# Patient Record
Sex: FEMALE | Race: WHITE | URBAN - METROPOLITAN AREA
[De-identification: names, ages, dates, MRNs, and addresses within clinical notes are randomized per-mention and may not be internally consistent; named-entity substitution may affect disease eponyms.]

---

## 2017-05-30 ENCOUNTER — OUTPATIENT (OUTPATIENT)
Dept: OUTPATIENT SERVICES | Facility: HOSPITAL | Age: 52
LOS: 1 days | Discharge: HOME | End: 2017-05-30

## 2017-06-26 ENCOUNTER — OUTPATIENT (OUTPATIENT)
Dept: OUTPATIENT SERVICES | Facility: HOSPITAL | Age: 52
LOS: 1 days | Discharge: HOME | End: 2017-06-26

## 2017-06-26 DIAGNOSIS — T50.901A POISONING BY UNSPECIFIED DRUGS, MEDICAMENTS AND BIOLOGICAL SUBSTANCES, ACCIDENTAL (UNINTENTIONAL), INITIAL ENCOUNTER: ICD-10-CM

## 2017-06-28 DIAGNOSIS — R51 HEADACHE: ICD-10-CM

## 2018-08-30 ENCOUNTER — INPATIENT (INPATIENT)
Facility: HOSPITAL | Age: 53
LOS: 2 days | Discharge: HOME | End: 2018-09-02
Attending: HOSPITALIST | Admitting: HOSPITALIST

## 2018-08-30 VITALS
DIASTOLIC BLOOD PRESSURE: 52 MMHG | HEART RATE: 55 BPM | OXYGEN SATURATION: 99 % | RESPIRATION RATE: 20 BRPM | TEMPERATURE: 98 F | SYSTOLIC BLOOD PRESSURE: 106 MMHG

## 2018-08-30 LAB
ALBUMIN SERPL ELPH-MCNC: 3.2 G/DL — LOW (ref 3.5–5.2)
ALP SERPL-CCNC: 121 U/L — HIGH (ref 30–115)
ALT FLD-CCNC: 13 U/L — SIGNIFICANT CHANGE UP (ref 0–41)
ANION GAP SERPL CALC-SCNC: 16 MMOL/L — HIGH (ref 7–14)
APAP SERPL-MCNC: <5 UG/ML — LOW (ref 10–30)
APPEARANCE UR: ABNORMAL
AST SERPL-CCNC: 14 U/L — SIGNIFICANT CHANGE UP (ref 0–41)
BACTERIA # UR AUTO: ABNORMAL /HPF
BASE EXCESS BLDV CALC-SCNC: 1.4 MMOL/L — SIGNIFICANT CHANGE UP (ref -2–2)
BASOPHILS # BLD AUTO: 0.04 K/UL — SIGNIFICANT CHANGE UP (ref 0–0.2)
BASOPHILS NFR BLD AUTO: 0.6 % — SIGNIFICANT CHANGE UP (ref 0–1)
BILIRUB DIRECT SERPL-MCNC: <0.2 MG/DL — SIGNIFICANT CHANGE UP (ref 0–0.2)
BILIRUB INDIRECT FLD-MCNC: SIGNIFICANT CHANGE UP MG/DL (ref 0.2–1.2)
BILIRUB SERPL-MCNC: <0.2 MG/DL — SIGNIFICANT CHANGE UP (ref 0.2–1.2)
BILIRUB UR-MCNC: NEGATIVE — SIGNIFICANT CHANGE UP
BUN SERPL-MCNC: 10 MG/DL — SIGNIFICANT CHANGE UP (ref 10–20)
CA-I SERPL-SCNC: 1.22 MMOL/L — SIGNIFICANT CHANGE UP (ref 1.12–1.3)
CALCIUM SERPL-MCNC: 8.3 MG/DL — LOW (ref 8.5–10.1)
CHLORIDE SERPL-SCNC: 107 MMOL/L — SIGNIFICANT CHANGE UP (ref 98–110)
CO2 SERPL-SCNC: 20 MMOL/L — SIGNIFICANT CHANGE UP (ref 17–32)
COLOR SPEC: YELLOW — SIGNIFICANT CHANGE UP
CREAT SERPL-MCNC: 0.8 MG/DL — SIGNIFICANT CHANGE UP (ref 0.7–1.5)
DIFF PNL FLD: NEGATIVE — SIGNIFICANT CHANGE UP
EOSINOPHIL # BLD AUTO: 0.07 K/UL — SIGNIFICANT CHANGE UP (ref 0–0.7)
EOSINOPHIL NFR BLD AUTO: 1 % — SIGNIFICANT CHANGE UP (ref 0–8)
EPI CELLS # UR: ABNORMAL /HPF
GAS PNL BLDV: 141 MMOL/L — SIGNIFICANT CHANGE UP (ref 136–145)
GAS PNL BLDV: SIGNIFICANT CHANGE UP
GLUCOSE SERPL-MCNC: 88 MG/DL — SIGNIFICANT CHANGE UP (ref 70–99)
GLUCOSE UR QL: NEGATIVE MG/DL — SIGNIFICANT CHANGE UP
HCO3 BLDV-SCNC: 25 MMOL/L — SIGNIFICANT CHANGE UP (ref 22–29)
HCT VFR BLD CALC: 33.6 % — LOW (ref 37–47)
HCT VFR BLDA CALC: 34.8 % — SIGNIFICANT CHANGE UP (ref 34–44)
HGB BLD CALC-MCNC: 11.4 G/DL — LOW (ref 14–18)
HGB BLD-MCNC: 10.9 G/DL — LOW (ref 12–16)
IMM GRANULOCYTES NFR BLD AUTO: 0.3 % — SIGNIFICANT CHANGE UP (ref 0.1–0.3)
KETONES UR-MCNC: NEGATIVE — SIGNIFICANT CHANGE UP
LACTATE BLDV-MCNC: 0.6 MMOL/L — SIGNIFICANT CHANGE UP (ref 0.5–1.6)
LEUKOCYTE ESTERASE UR-ACNC: ABNORMAL
LIDOCAIN IGE QN: 32 U/L — SIGNIFICANT CHANGE UP (ref 7–60)
LYMPHOCYTES # BLD AUTO: 1.4 K/UL — SIGNIFICANT CHANGE UP (ref 1.2–3.4)
LYMPHOCYTES # BLD AUTO: 20.5 % — SIGNIFICANT CHANGE UP (ref 20.5–51.1)
MCHC RBC-ENTMCNC: 27.4 PG — SIGNIFICANT CHANGE UP (ref 27–31)
MCHC RBC-ENTMCNC: 32.4 G/DL — SIGNIFICANT CHANGE UP (ref 32–37)
MCV RBC AUTO: 84.4 FL — SIGNIFICANT CHANGE UP (ref 81–99)
MONOCYTES # BLD AUTO: 0.49 K/UL — SIGNIFICANT CHANGE UP (ref 0.1–0.6)
MONOCYTES NFR BLD AUTO: 7.2 % — SIGNIFICANT CHANGE UP (ref 1.7–9.3)
NEUTROPHILS # BLD AUTO: 4.82 K/UL — SIGNIFICANT CHANGE UP (ref 1.4–6.5)
NEUTROPHILS NFR BLD AUTO: 70.4 % — SIGNIFICANT CHANGE UP (ref 42.2–75.2)
NITRITE UR-MCNC: POSITIVE
NRBC # BLD: 0 /100 WBCS — SIGNIFICANT CHANGE UP (ref 0–0)
PCO2 BLDV: 37 MMHG — LOW (ref 41–51)
PH BLDV: 7.45 — HIGH (ref 7.26–7.43)
PH UR: 6.5 — SIGNIFICANT CHANGE UP (ref 5–8)
PLATELET # BLD AUTO: 237 K/UL — SIGNIFICANT CHANGE UP (ref 130–400)
PO2 BLDV: 57 MMHG — HIGH (ref 20–40)
POTASSIUM BLDV-SCNC: 4.5 MMOL/L — SIGNIFICANT CHANGE UP (ref 3.3–5.6)
POTASSIUM SERPL-MCNC: 5.2 MMOL/L — HIGH (ref 3.5–5)
POTASSIUM SERPL-SCNC: 5.2 MMOL/L — HIGH (ref 3.5–5)
PROT SERPL-MCNC: 6.8 G/DL — SIGNIFICANT CHANGE UP (ref 6–8)
PROT UR-MCNC: NEGATIVE MG/DL — SIGNIFICANT CHANGE UP
RBC # BLD: 3.98 M/UL — LOW (ref 4.2–5.4)
RBC # FLD: 13.7 % — SIGNIFICANT CHANGE UP (ref 11.5–14.5)
SALICYLATES SERPL-MCNC: <0.3 MG/DL — LOW (ref 4–30)
SAO2 % BLDV: 91 % — SIGNIFICANT CHANGE UP
SODIUM SERPL-SCNC: 143 MMOL/L — SIGNIFICANT CHANGE UP (ref 135–146)
SP GR SPEC: 1.01 — SIGNIFICANT CHANGE UP (ref 1.01–1.03)
TROPONIN T SERPL-MCNC: <0.01 NG/ML — SIGNIFICANT CHANGE UP
UROBILINOGEN FLD QL: 0.2 MG/DL — SIGNIFICANT CHANGE UP (ref 0.2–0.2)
WBC # BLD: 6.84 K/UL — SIGNIFICANT CHANGE UP (ref 4.8–10.8)
WBC # FLD AUTO: 6.84 K/UL — SIGNIFICANT CHANGE UP (ref 4.8–10.8)
WBC UR QL: ABNORMAL /HPF

## 2018-08-30 RX ORDER — CEFTRIAXONE 500 MG/1
1 INJECTION, POWDER, FOR SOLUTION INTRAMUSCULAR; INTRAVENOUS ONCE
Qty: 0 | Refills: 0 | Status: COMPLETED | OUTPATIENT
Start: 2018-08-30 | End: 2018-08-30

## 2018-08-30 RX ORDER — AZITHROMYCIN 500 MG/1
500 TABLET, FILM COATED ORAL ONCE
Qty: 0 | Refills: 0 | Status: COMPLETED | OUTPATIENT
Start: 2018-08-30 | End: 2018-08-30

## 2018-08-30 RX ORDER — PANTOPRAZOLE SODIUM 20 MG/1
40 TABLET, DELAYED RELEASE ORAL ONCE
Qty: 0 | Refills: 0 | Status: COMPLETED | OUTPATIENT
Start: 2018-08-30 | End: 2018-08-30

## 2018-08-30 RX ORDER — ASPIRIN/CALCIUM CARB/MAGNESIUM 324 MG
324 TABLET ORAL ONCE
Qty: 0 | Refills: 0 | Status: COMPLETED | OUTPATIENT
Start: 2018-08-30 | End: 2018-08-30

## 2018-08-30 RX ORDER — IPRATROPIUM/ALBUTEROL SULFATE 18-103MCG
3 AEROSOL WITH ADAPTER (GRAM) INHALATION
Qty: 0 | Refills: 0 | Status: COMPLETED | OUTPATIENT
Start: 2018-08-30 | End: 2018-08-30

## 2018-08-30 RX ORDER — SODIUM CHLORIDE 9 MG/ML
1000 INJECTION INTRAMUSCULAR; INTRAVENOUS; SUBCUTANEOUS
Qty: 0 | Refills: 0 | Status: DISCONTINUED | OUTPATIENT
Start: 2018-08-30 | End: 2018-09-02

## 2018-08-30 RX ADMIN — Medication 3 MILLILITER(S): at 19:40

## 2018-08-30 RX ADMIN — Medication 324 MILLIGRAM(S): at 20:25

## 2018-08-30 RX ADMIN — PANTOPRAZOLE SODIUM 40 MILLIGRAM(S): 20 TABLET, DELAYED RELEASE ORAL at 20:23

## 2018-08-30 RX ADMIN — CEFTRIAXONE 100 GRAM(S): 500 INJECTION, POWDER, FOR SOLUTION INTRAMUSCULAR; INTRAVENOUS at 20:32

## 2018-08-30 RX ADMIN — AZITHROMYCIN 255 MILLIGRAM(S): 500 TABLET, FILM COATED ORAL at 20:33

## 2018-08-30 RX ADMIN — SODIUM CHLORIDE 250 MILLILITER(S): 9 INJECTION INTRAMUSCULAR; INTRAVENOUS; SUBCUTANEOUS at 18:27

## 2018-08-30 RX ADMIN — Medication 3 MILLILITER(S): at 20:23

## 2018-08-30 RX ADMIN — Medication 3 MILLILITER(S): at 20:19

## 2018-08-30 NOTE — ED PROVIDER NOTE - PHYSICAL EXAMINATION
CONSTITUTIONAL: Well-appearing; well-nourished; in no apparent distress.   EYES: PERRL; EOM intact.   ENT: Airway patent  NECK: Supple; non-tender; no cervical lymphadenopathy. No JVD.   CARDIOVASCULAR: Normal S1, S2; no murmurs, rubs, or gallops.   RESPIRATORY: + b/l rhonchi. No tachypnea, retractions, signs of respiratory distress  GI/: Normal bowel sounds; non-distended; non-tender; no palpable organomegaly.   MS: No evidence of trauma or deformity. Normal ROM in all four extremities; non-tender to palpation  EXTREM: No peripheral edema. No calf tenderness  SKIN: Normal for age and race; warm; dry; good turgor; no apparent lesions or exudate.   NEURO/PSYCH: A & O x 4; grossly unremarkable. mood and manner are appropriate. Grooming and personal hygiene are appropriate. No apparent thoughts of harm to self or others.

## 2018-08-30 NOTE — ED ADULT NURSE NOTE - NSIMPLEMENTINTERV_GEN_ALL_ED
Implemented All Universal Safety Interventions:  [place] to call system. Call bell, personal items and telephone within reach. Instruct patient to call for assistance. Room bathroom lighting operational. Non-slip footwear when patient is off stretcher. Physically safe environment: no spills, clutter or unnecessary equipment. Stretcher in lowest position, wheels locked, appropriate side rails in place.

## 2018-08-30 NOTE — ED ADULT NURSE NOTE - OBJECTIVE STATEMENT
Patient present ED with fever, chills, chest pain, and SOB x3 days. Denies fever, chills, nausea, vomiting, and diarrhea.

## 2018-08-30 NOTE — ED PROVIDER NOTE - CARE PLAN
Principal Discharge DX:	Pneumonia of right lower lobe due to infectious organism  Secondary Diagnosis:	Chest pain, unspecified type Principal Discharge DX:	Pneumonia of right lower lobe due to infectious organism  Assessment and plan of treatment:	abx, duoneb, admission  Secondary Diagnosis:	Chest pain, unspecified type

## 2018-08-30 NOTE — ED PROVIDER NOTE - ATTENDING CONTRIBUTION TO CARE
This is a 53yoF fibromyalgia, hx Lyme disease, polysubstance abuse (benzos, oxycodone, ambien), active smoker who presents with 1wk of nightly fevers (T103), worsening fatigue, new chest pain and SOB.  CP is substernal and radiating L and R across anterior chest, associated with SOB and nausea w/o vomiting. Also endorses constipation (acute on chronic), not relieved by OTC remedies and her usual bowel regimen (though she has not been taking her senokot).    She was seen 2wk ago in [place] for syncope and discharged, but does not know what her discharge diagnosis was.  She was supposed to f/u with cardiology, but appointment was delayed 2/2 cardiologist vacation, so she saw her PCP today, who suggested she come to the ED.  Family report sig concern about her fevers, also noting that she is intermittently falling asleep during the day, for example at the table during meals, and that she seems weaker than usual.  Her sister acknowledges that she is still smoking cig and taking prescription pills from other people, though pt may deny it.    VS notable for afebrile HR 55 /52  Exam shows thin, sallow woman, in NAD, speaking slowly but in full sentences.  Mildly dry MM, bradycardic w/o murmurs, b/l breath sounds w/ expiratory wheezing but w/o accessory muscle use. Abd soft, NT, ND.  Pt asking for a warm blanket and a hot drink as she is feeling chilled.    Unclear etiology for her sx, with ddx including infectious (new/acute infection, also possibly chronic Lyme). Bacteremia and endocarditis is possible, though pt denies IVDU.  Her wheezing may be related to likely COPD in the setting of years of smoking with occ inhaler use at baseline, but may also indicate pneumonia.  Her encephalopathy may be from infection, dehydration, substance use; less likely intracranial given nonfocal neuro exam.    Will start with labs, CXR, EKG (reviewed by me, see note), IV fluids and antiemetics as needed. This is a 53yoF fibromyalgia, hx Lyme disease, polysubstance abuse (benzos, oxycodone, ambien), active smoker who presents with 1wk of nightly fevers (T103), worsening fatigue, new chest pain and SOB.  CP is substernal and radiating L and R across anterior chest, associated with SOB and nausea w/o vomiting. She is on daily oxycodone 10mg multiple times a day for back/leg pain but says it does not help for her current discomfort.  Also endorses constipation (acute on chronic), not relieved by OTC remedies and her usual bowel regimen (though she has not been taking her senokot).    She was seen 2wk ago in [place] for syncope and discharged, but does not know what her discharge diagnosis was.  She was supposed to f/u with cardiology, but appointment was delayed 2/2 cardiologist vacation, so she saw her PCP today, who suggested she come to the ED.  Family report sig concern about her fevers, also noting that she is intermittently falling asleep during the day, for example at the table during meals, and that she seems weaker than usual.  Her sister acknowledges that she is still smoking cig and taking prescription pills from other people, though pt may deny it.    VS notable for afebrile HR 55 /52  Exam shows thin, sallow woman, in NAD, speaking slowly but in full sentences.  Mildly dry MM, bradycardic w/o murmurs, b/l breath sounds w/ expiratory wheezing but w/o accessory muscle use. Abd soft, NT, ND.  Pt asking for a warm blanket and a hot drink as she is feeling chilled.    Unclear etiology for her sx, with ddx including infectious (new/acute infection, also possibly chronic Lyme). Bacteremia and endocarditis is possible, though pt denies IVDU.  Her wheezing may be related to likely COPD in the setting of years of smoking with occ inhaler use at baseline, but may also indicate pneumonia.  Her encephalopathy may be from infection, dehydration, substance use; less likely intracranial given nonfocal neuro exam.    Will start with labs, CXR, EKG (reviewed by me, see note), IV fluids and antiemetics as needed.

## 2018-08-30 NOTE — ED PROVIDER NOTE - PROGRESS NOTE DETAILS
CXR with bilateral middle lobe opacities concerning for pneumonia, yessy given pt fevers, inc cough, CP and SOB.  No new O2 requirement and speaking in full sentences w/o inc accessory muscle use but with wheezing.  Otherwise, labs reassuring.  EKG stable compared to 2016.  Will adm for abx in addition to tele monitoring given her CP and incompletely evaluated prior episode of syncope.

## 2018-08-30 NOTE — ED PROVIDER NOTE - MEDICAL DECISION MAKING DETAILS
Pt p/w 1wk of fevers, inc cough, CP and SOB in the setting of recent syncope.  CXR concerning for b/l pneumonia, though unlikely to fully explain her mental status.  Will adm to med/tele for abx and monitoring given CP and syncope.

## 2018-08-30 NOTE — ED PROVIDER NOTE - NS ED ROS FT
Constitutional: no fever, chills, no recent weight loss, change in appetite or malaise  Eyes: no vision changes  ENT: no rhinorrhea/ear pain/sore throat  Cardiac: + chest pain and sob  Respiratory: + cough. No respiratory distress  GI: + n/v. No diarrhea  : No dysuria, frequency, urgency or hematuria  MS: no pain to back or extremities, no loss of ROM, no weakness  Neuro: No headache or weakness. No LOC.  Skin: No skin rash.  Endocrine: No history of thyroid disease or diabetes.  Except as documented in the HPI, all other systems are negative.

## 2018-08-30 NOTE — ED PROVIDER NOTE - OBJECTIVE STATEMENT
53 year old female with pmhx of lyme disease and fibromyalgia c/o fever, sob and chest pain x 1 week. Pt has been having high fevers in the evening with tmax of 103F. + nausea, generalized abdominal pain, decreased appetite. Pt was evaluated at [facility] two weeks ago after syncopal episode. As per pt her HR was found to be in the 20's and was instructed to follow up with her cardiologist. + smoking hx.  She denies any calf pain/swelling, recent travel, changes in vision, cardiac hx. Pts sister sts she believe she has also been using benzo's.

## 2018-08-31 DIAGNOSIS — S52.90XA UNSPECIFIED FRACTURE OF UNSPECIFIED FOREARM, INITIAL ENCOUNTER FOR CLOSED FRACTURE: Chronic | ICD-10-CM

## 2018-08-31 DIAGNOSIS — Z98.890 OTHER SPECIFIED POSTPROCEDURAL STATES: Chronic | ICD-10-CM

## 2018-08-31 DIAGNOSIS — Z98.891 HISTORY OF UTERINE SCAR FROM PREVIOUS SURGERY: Chronic | ICD-10-CM

## 2018-08-31 DIAGNOSIS — Z90.49 ACQUIRED ABSENCE OF OTHER SPECIFIED PARTS OF DIGESTIVE TRACT: Chronic | ICD-10-CM

## 2018-08-31 LAB
ALBUMIN SERPL ELPH-MCNC: 3.2 G/DL — LOW (ref 3.5–5.2)
ALP SERPL-CCNC: 101 U/L — SIGNIFICANT CHANGE UP (ref 30–115)
ALT FLD-CCNC: 11 U/L — SIGNIFICANT CHANGE UP (ref 0–41)
AMPHET UR-MCNC: POSITIVE
ANION GAP SERPL CALC-SCNC: 14 MMOL/L — SIGNIFICANT CHANGE UP (ref 7–14)
AST SERPL-CCNC: 11 U/L — SIGNIFICANT CHANGE UP (ref 0–41)
BARBITURATES UR SCN-MCNC: POSITIVE
BASOPHILS # BLD AUTO: 0.04 K/UL — SIGNIFICANT CHANGE UP (ref 0–0.2)
BASOPHILS NFR BLD AUTO: 0.5 % — SIGNIFICANT CHANGE UP (ref 0–1)
BENZODIAZ UR-MCNC: POSITIVE
BILIRUB SERPL-MCNC: <0.2 MG/DL — SIGNIFICANT CHANGE UP (ref 0.2–1.2)
BUN SERPL-MCNC: 12 MG/DL — SIGNIFICANT CHANGE UP (ref 10–20)
CALCIUM SERPL-MCNC: 8.4 MG/DL — LOW (ref 8.5–10.1)
CHLORIDE SERPL-SCNC: 106 MMOL/L — SIGNIFICANT CHANGE UP (ref 98–110)
CK MB CFR SERPL CALC: <1 NG/ML — SIGNIFICANT CHANGE UP (ref 0.6–6.3)
CK SERPL-CCNC: 26 U/L — SIGNIFICANT CHANGE UP (ref 0–225)
CO2 SERPL-SCNC: 20 MMOL/L — SIGNIFICANT CHANGE UP (ref 17–32)
COCAINE METAB.OTHER UR-MCNC: NEGATIVE — SIGNIFICANT CHANGE UP
CREAT SERPL-MCNC: 0.6 MG/DL — LOW (ref 0.7–1.5)
DRUG SCREEN 1, URINE RESULT: SIGNIFICANT CHANGE UP
EOSINOPHIL # BLD AUTO: 0.1 K/UL — SIGNIFICANT CHANGE UP (ref 0–0.7)
EOSINOPHIL NFR BLD AUTO: 1.2 % — SIGNIFICANT CHANGE UP (ref 0–8)
GLUCOSE SERPL-MCNC: 90 MG/DL — SIGNIFICANT CHANGE UP (ref 70–99)
HCT VFR BLD CALC: 31.6 % — LOW (ref 37–47)
HGB BLD-MCNC: 9.9 G/DL — LOW (ref 12–16)
IMM GRANULOCYTES NFR BLD AUTO: 0.3 % — SIGNIFICANT CHANGE UP (ref 0.1–0.3)
LYMPHOCYTES # BLD AUTO: 1.82 K/UL — SIGNIFICANT CHANGE UP (ref 1.2–3.4)
LYMPHOCYTES # BLD AUTO: 21 % — SIGNIFICANT CHANGE UP (ref 20.5–51.1)
MAGNESIUM SERPL-MCNC: 2 MG/DL — SIGNIFICANT CHANGE UP (ref 1.8–2.4)
MCHC RBC-ENTMCNC: 26.9 PG — LOW (ref 27–31)
MCHC RBC-ENTMCNC: 31.3 G/DL — LOW (ref 32–37)
MCV RBC AUTO: 85.9 FL — SIGNIFICANT CHANGE UP (ref 81–99)
METHADONE UR-MCNC: NEGATIVE — SIGNIFICANT CHANGE UP
MONOCYTES # BLD AUTO: 0.67 K/UL — HIGH (ref 0.1–0.6)
MONOCYTES NFR BLD AUTO: 7.7 % — SIGNIFICANT CHANGE UP (ref 1.7–9.3)
NEUTROPHILS # BLD AUTO: 5.99 K/UL — SIGNIFICANT CHANGE UP (ref 1.4–6.5)
NEUTROPHILS NFR BLD AUTO: 69.3 % — SIGNIFICANT CHANGE UP (ref 42.2–75.2)
NRBC # BLD: 0 /100 WBCS — SIGNIFICANT CHANGE UP (ref 0–0)
OPIATES UR-MCNC: POSITIVE
PCP UR-MCNC: NEGATIVE — SIGNIFICANT CHANGE UP
PLATELET # BLD AUTO: 220 K/UL — SIGNIFICANT CHANGE UP (ref 130–400)
POTASSIUM SERPL-MCNC: 4.2 MMOL/L — SIGNIFICANT CHANGE UP (ref 3.5–5)
POTASSIUM SERPL-SCNC: 4.2 MMOL/L — SIGNIFICANT CHANGE UP (ref 3.5–5)
PROPOXYPHENE QUALITATIVE URINE RESULT: NEGATIVE — SIGNIFICANT CHANGE UP
PROT SERPL-MCNC: 6.1 G/DL — SIGNIFICANT CHANGE UP (ref 6–8)
RBC # BLD: 3.68 M/UL — LOW (ref 4.2–5.4)
RBC # FLD: 13.8 % — SIGNIFICANT CHANGE UP (ref 11.5–14.5)
SODIUM SERPL-SCNC: 140 MMOL/L — SIGNIFICANT CHANGE UP (ref 135–146)
THC UR QL: NEGATIVE — SIGNIFICANT CHANGE UP
TROPONIN T SERPL-MCNC: <0.01 NG/ML — SIGNIFICANT CHANGE UP
WBC # BLD: 8.65 K/UL — SIGNIFICANT CHANGE UP (ref 4.8–10.8)
WBC # FLD AUTO: 8.65 K/UL — SIGNIFICANT CHANGE UP (ref 4.8–10.8)

## 2018-08-31 RX ORDER — ROPINIROLE 8 MG/1
0.25 TABLET, FILM COATED, EXTENDED RELEASE ORAL THREE TIMES A DAY
Qty: 0 | Refills: 0 | Status: DISCONTINUED | OUTPATIENT
Start: 2018-08-31 | End: 2018-09-02

## 2018-08-31 RX ORDER — ENOXAPARIN SODIUM 100 MG/ML
40 INJECTION SUBCUTANEOUS EVERY 24 HOURS
Qty: 0 | Refills: 0 | Status: DISCONTINUED | OUTPATIENT
Start: 2018-08-31 | End: 2018-09-02

## 2018-08-31 RX ORDER — OXYCODONE HYDROCHLORIDE 5 MG/1
20 TABLET ORAL EVERY 12 HOURS
Qty: 0 | Refills: 0 | Status: DISCONTINUED | OUTPATIENT
Start: 2018-08-31 | End: 2018-09-02

## 2018-08-31 RX ORDER — CEFTRIAXONE 500 MG/1
2 INJECTION, POWDER, FOR SOLUTION INTRAMUSCULAR; INTRAVENOUS EVERY 24 HOURS
Qty: 0 | Refills: 0 | Status: DISCONTINUED | OUTPATIENT
Start: 2018-08-31 | End: 2018-08-31

## 2018-08-31 RX ORDER — DOCUSATE SODIUM 100 MG
100 CAPSULE ORAL THREE TIMES A DAY
Qty: 0 | Refills: 0 | Status: DISCONTINUED | OUTPATIENT
Start: 2018-08-31 | End: 2018-09-02

## 2018-08-31 RX ORDER — CHLORHEXIDINE GLUCONATE 213 G/1000ML
1 SOLUTION TOPICAL
Qty: 0 | Refills: 0 | Status: DISCONTINUED | OUTPATIENT
Start: 2018-08-31 | End: 2018-09-02

## 2018-08-31 RX ORDER — FLUOXETINE HCL 10 MG
40 CAPSULE ORAL DAILY
Qty: 0 | Refills: 0 | Status: DISCONTINUED | OUTPATIENT
Start: 2018-08-31 | End: 2018-09-02

## 2018-08-31 RX ORDER — FAMOTIDINE 10 MG/ML
40 INJECTION INTRAVENOUS
Qty: 0 | Refills: 0 | Status: DISCONTINUED | OUTPATIENT
Start: 2018-08-31 | End: 2018-09-02

## 2018-08-31 RX ORDER — DIAZEPAM 5 MG
10 TABLET ORAL THREE TIMES A DAY
Qty: 0 | Refills: 0 | Status: DISCONTINUED | OUTPATIENT
Start: 2018-08-31 | End: 2018-09-02

## 2018-08-31 RX ORDER — CEFTRIAXONE 500 MG/1
1 INJECTION, POWDER, FOR SOLUTION INTRAMUSCULAR; INTRAVENOUS EVERY 24 HOURS
Qty: 0 | Refills: 0 | Status: DISCONTINUED | OUTPATIENT
Start: 2018-08-31 | End: 2018-09-02

## 2018-08-31 RX ORDER — SENNA PLUS 8.6 MG/1
2 TABLET ORAL AT BEDTIME
Qty: 0 | Refills: 0 | Status: DISCONTINUED | OUTPATIENT
Start: 2018-08-31 | End: 2018-09-02

## 2018-08-31 RX ORDER — AZITHROMYCIN 500 MG/1
500 TABLET, FILM COATED ORAL EVERY 24 HOURS
Qty: 0 | Refills: 0 | Status: DISCONTINUED | OUTPATIENT
Start: 2018-08-31 | End: 2018-09-02

## 2018-08-31 RX ADMIN — Medication 40 MILLIGRAM(S): at 11:33

## 2018-08-31 RX ADMIN — Medication 10 MILLIGRAM(S): at 14:14

## 2018-08-31 RX ADMIN — Medication 10 MILLIGRAM(S): at 03:16

## 2018-08-31 RX ADMIN — OXYCODONE HYDROCHLORIDE 20 MILLIGRAM(S): 5 TABLET ORAL at 05:15

## 2018-08-31 RX ADMIN — CEFTRIAXONE 100 GRAM(S): 500 INJECTION, POWDER, FOR SOLUTION INTRAMUSCULAR; INTRAVENOUS at 05:15

## 2018-08-31 RX ADMIN — Medication 150 MILLIGRAM(S): at 05:15

## 2018-08-31 RX ADMIN — ENOXAPARIN SODIUM 40 MILLIGRAM(S): 100 INJECTION SUBCUTANEOUS at 05:16

## 2018-08-31 RX ADMIN — OXYCODONE HYDROCHLORIDE 20 MILLIGRAM(S): 5 TABLET ORAL at 17:33

## 2018-08-31 RX ADMIN — Medication 10 MILLIGRAM(S): at 22:12

## 2018-08-31 RX ADMIN — AZITHROMYCIN 255 MILLIGRAM(S): 500 TABLET, FILM COATED ORAL at 05:16

## 2018-08-31 RX ADMIN — OXYCODONE HYDROCHLORIDE 20 MILLIGRAM(S): 5 TABLET ORAL at 06:18

## 2018-08-31 RX ADMIN — OXYCODONE HYDROCHLORIDE 20 MILLIGRAM(S): 5 TABLET ORAL at 17:03

## 2018-08-31 RX ADMIN — Medication 150 MILLIGRAM(S): at 17:09

## 2018-08-31 NOTE — H&P ADULT - ATTENDING COMMENTS
54 yo F with PMH of Lyme disease, fibromyalgia, chronic back pain, restless leg syndrome, polysubstance use presented to ED with 1 week of nightly fevers. Patient has multiple vague complaints in addition to her fevers including chills, shortness of breath, cough, chest pain, palpitations, and abdominal pain. Also reports some nausea and constipation. Patient reports she was last seen at [place] after syncopal episode. States her HR was "20". Was discharged with instructions to follow up with Cardiologist (Dr Malin). Was unable to do so because Dr Malin is away on vacation. States she has been feeling unwell since [facility] visit last Monday. However, current symptoms started this past Sunday. She did not seek medical attention earlier because her son's birthday was this past Wednesday and she wanted to wait. Finally went into see PCP day PTP who recommended that she come to hospital.    At time of writer's assessment, patient was still complaining of vague chest pain and abdominal pain. She is on a number of pain medications for her back pain as well as Prozac + Valium. She has been admitted to [facility] in past (2015) for suspected overdose. Family believes patient may be taking prescription pills from other people (though patient denies it).    In ED patient was noted to have bilateral opacities on CXR. Given reported cough + fevers + chills, patient is being treated for community acquired pneumonia.    REVIEW OF SYSTEMS:see cc/HPI  CONSTITUTIONAL: No weakness,(+) fevers/ chills, (+) chronic pain   EYES/ENT: No visual changes;  No vertigo or throat pain   NECK: No pain or stiffness  RESPIRATORY: No cough, wheezing, hemoptysis; No shortness of breath  CARDIOVASCULAR: No chest pain or palpitations  GASTROINTESTINAL: No abdominal or epigastric pain. No nausea, vomiting, or hematemesis; No diarrhea or constipation. No melena or hematochezia.  GENITOURINARY: No dysuria, frequency or hematuria  NEUROLOGICAL: No numbness or weakness  SKIN: No itching, rashes    Physical Exam:  General: WN/WD NAD  Neurology: A&Ox3, nonfocal, follows commands  Eyes: PERRLA/ EOMI  ENT/Neck: Neck supple, trachea midline, No JVD  Respiratory: wheezing B/L, rales on the R, no rhonchi  CV: Normal rate regular rhythm, S1S2, no murmurs, rubs or gallops  Abdominal: Soft, NT, ND +BS,   Extremities: No edema, + peripheral pulses  Skin: No Rashes, Hematoma, Ecchymosis  Incisions: n/a  Tubes:n/a    A/P  CAP w/ cough and atypical CP w/o hypoxia  -IV abx   -check blood Cx    Bradycardia/syncope ??etiology and unsure of the hx given the fact that she was discharged from [facility] w/o intervention  -admit to tele   -EP eval   -serial EKG and Dionicio    Chronic back pain   -agree w/ caution in pain Rx    Restless leg syndrome  -c/w Ropinirole    DVT prophylaxis

## 2018-08-31 NOTE — H&P ADULT - NSHPPHYSICALEXAM_GEN_ALL_CORE
GEN: NAD, comfortable  CARDIO: RRR, no m/r/g  RESP: b/l expiratory wheeze  ABD: soft, non-distended, tender to palpation diffusely, no guarding/rigidity  EXT: no edema, pp b/l  NEURO: AAOx3, non-focal, blunted affect

## 2018-08-31 NOTE — H&P ADULT - ASSESSMENT
54 yo F with PMH of Lyme disease, fibromyalgia, chronic back pain, restless leg syndrome, polysubstance use presented to ED with 1 week of nightly fevers. Patient has multiple vague complaints in addition to her fevers including chills, shortness of breath, cough, chest pain, palpitations, and abdominal pain. Also reports some nausea and constipation. In ED CXR showed b/l opacities.    #) fevers / cough / SOB / CP 2/2 PNA  - CXR b/l opacities  - symptoms + active wheezing consistent with PNA. Will treat w/ CAP coverage Rocephin + Azithro  - other vague symptoms could possibly 2/2 fibromyalgia vs polypharmacy vs ???  - chest pain does not appear to be Cardiac in origin - no acute EKG changes, CE -ve x1. Will trend  - f/u blood cultures    #) Fibromyalgia - c/w Prozac + Valium    #) Chronic back pain - c/w Oxycodone + Lyrica. Starting Oxy at lower dose than reported home regiment given concern for polypharmacy and patient's current state. Please monitor for signs of withdrawal.    #) Restless leg syndrome - c/w Ropinirole    DVT ppx: Lovenox subQ  Diet: regular  Activity: ambulate as tolerated  Code status: FULL  Dispo: anticipate home    PLEASE CONFIRM MED REC WITH PATIENT'S PHARMACY DURING DAYTIME - HIGH SUSPICION/CONCERN OVER POLYPHARMACY CONTRIBUTING TO PATIENT'S CURRENT STATE. 54 yo F with PMH of Lyme disease, fibromyalgia, chronic back pain, restless leg syndrome, polysubstance use presented to ED with 1 week of nightly fevers. Patient has multiple vague complaints in addition to her fevers including chills, shortness of breath, cough, chest pain, palpitations, and abdominal pain. Also reports some nausea and constipation. In ED CXR showed b/l opacities.    #) fevers / cough / SOB / CP 2/2 PNA  - CXR b/l opacities  - symptoms + active wheezing consistent with PNA. Will treat w/ CAP coverage Rocephin + Azithro  - other vague symptoms could possibly 2/2 fibromyalgia vs polypharmacy vs chronic Lyme disease vs ???  - chest pain does not appear to be Cardiac in origin - no acute EKG changes, CE -ve x1. Will trend  - f/u blood cultures    #) Fibromyalgia - c/w Prozac + Valium    #) Chronic back pain - c/w Oxycodone + Lyrica. Starting Oxy at lower dose than reported home regiment given concern for polypharmacy and patient's current state. Please monitor for signs of withdrawal.    #) Restless leg syndrome - c/w Ropinirole    DVT ppx: Lovenox subQ  Diet: regular  Activity: ambulate as tolerated  Code status: FULL  Dispo: anticipate home    PLEASE CONFIRM MED REC WITH PATIENT'S PHARMACY DURING DAYTIME - HIGH SUSPICION/CONCERN OVER POLYPHARMACY CONTRIBUTING TO PATIENT'S CURRENT STATE.

## 2018-08-31 NOTE — H&P ADULT - HISTORY OF PRESENT ILLNESS
52 yo F with PMH of Lyme disease, fibromyalgia, chronic back pain, restless leg syndrome, polysubstance use presented to ED with 1 week of nightly fevers. Patient has multiple vague complaints in addition to her fevers including chills, shortness of breath, cough, chest pain, palpitations, and abdominal pain. Also reports some nausea and constipation. Patient reports she was last seen at [place] after syncopal episode. States her HR was "20". Was discharged with instructions to follow up with Cardiologist (Dr Malin). Was unable to do so because Dr Malin is away on vacation. States she has been feeling unwell since [place] visit last Monday. However, current symptoms started this past Sunday. She did not seek medical attention earlier because her son's birthday was this past Wednesday and she wanted to wait. Finally went into see PCP day PTP who recommended that she come to hospital.    At time of writer's assessment, patient was still complaining of vague chest pain and abdominal pain. She is on a number of pain medications for her back pain as well as Prozac + Valium. She has been admitted to [facility] in past (2015) for suspected overdose. Family believes patient may be taking prescription pills from other people (though patient denies it).    In ED patient was noted to have bilateral opacities on CXR. Given reported cough + fevers + chills, patient is being treated for community acquired pneumonia.

## 2018-08-31 NOTE — CONSULT NOTE ADULT - SUBJECTIVE AND OBJECTIVE BOX
HPI:  52 yo F with PMH of Lyme disease, fibromyalgia, chronic back pain, restless leg syndrome, polysubstance use presented to ED with 1 week of nightly fevers. Patient has multiple vague complaints in addition to her fevers including chills, shortness of breath, cough, chest pain, palpitations, and abdominal pain. Also reports some nausea and constipation. Patient reports she was last seen at [place] after syncopal episode. States her HR was "20". Was discharged with instructions to follow up with Cardiologist (Dr Malin). Was unable to do so because Dr Malin is away on vacation. States she has been feeling unwell since [place] visit last Monday. However, current symptoms started this past . She did not seek medical attention earlier because her son's birthday was this past Wednesday and she wanted to wait. Finally went into see PCP day PTP who recommended that she come to hospital.    At time of writer's assessment, patient was still complaining of vague chest pain and abdominal pain. She is on a number of pain medications for her back pain as well as Prozac + Valium. She has been admitted to [facility] in past () for suspected overdose. Family believes patient may be taking prescription pills from other people (though patient denies it).    In ED patient was noted to have bilateral opacities on CXR. Given reported cough + fevers + chills, patient is being treated for community acquired pneumonia. (31 Aug 2018 00:08)Right now being treated by abx for suspected PNA.    CARDIOLOGY Hx; Pt is 53 yr F with hx of Lyme disease 3 yrs ago being treated by IV infusions as per pt. She is seeing Dr Malin for palpitations and racing of heart .last visit 2 yr ago ,was started on Metoprolol 50 mg ,which she stopped herself after 1 yr and takes sometimes if feels palpitations  .1 week ago she the above documented incidence ,with diziness and light headed ness ,she synopsized and fell ,was taken to hospital ,at that time was bradycardiac in 20'ans went upto 60 on its own as per pt. She was discharged with out any work up ,asked to follow cardiolgy.   3 days back she too 50 mg 2 tab of metoprolol on night. She is on opioid  pain medications and SSRI.  at the time of admission ,sinus reilly in 42/min ,now in > 60'on monitor with frequent PVCs   no complaints now except cough ,chest pain with coughing    Stress test in  (normal)  Echo in 2008 ; EF 60%   never had a cath       PAST MEDICAL & SURGICAL HISTORY  PAST MEDICAL & SURGICAL HISTORY:  Restless leg syndrome  Polysubstance dependence  Back pain, chronic  Lyme disease  Fibromyalgia  History of gastric surgery  Fracture, radius  History of  section  History of appendectomy    SOCIAL HISTORY:  active smoker   denies any drug abuse ?  ALLERGIES:  No Known Allergies      Home Medications:  Ambien 10 mg oral tablet: 1 tab(s) orally once a day (at bedtime), As Needed (31 Aug 2018 00:45)  diazePAM 10 mg oral tablet: 1 tab(s) orally 3 times a day (31 Aug 2018 00:47)  Fioricet oral tablet: 1 tab(s) orally every 4 hours, As Needed (31 Aug 2018 00:48)  FLUoxetine 40 mg oral capsule: 1 cap(s) orally once a day (31 Aug 2018 00:47)  Lyrica 150 mg oral capsule: 1 cap(s) orally 2 times a day (31 Aug 2018 00:45)  oxyCODONE 10 mg oral tablet: 3 tab(s) orally every 8 hours, As Needed (31 Aug 2018 00:46)  rOPINIRole 0.25 mg oral tablet: 1 tab(s) orally 3 times a day, As Needed (31 Aug 2018 00:48)  traZODone 100 mg oral tablet: 1 tab(s) orally 3 times a day, As Needed (31 Aug 2018 00:47)    MEDICATIONS:  STANDING MEDICATIONS  azithromycin  IVPB 500 milliGRAM(s) IV Intermittent every 24 hours  cefTRIAXone   IVPB 1 Gram(s) IV Intermittent every 24 hours  chlorhexidine 4% Liquid 1 Application(s) Topical <User Schedule>  docusate sodium 100 milliGRAM(s) Oral three times a day  enoxaparin Injectable 40 milliGRAM(s) SubCutaneous every 24 hours  FLUoxetine 40 milliGRAM(s) Oral daily  oxyCODONE  ER Tablet 20 milliGRAM(s) Oral every 12 hours  pregabalin 150 milliGRAM(s) Oral two times a day  sodium chloride 0.9%. 1000 milliLiter(s) IV Continuous <Continuous>    PRN MEDICATIONS  diazepam    Tablet 10 milliGRAM(s) Oral three times a day PRN  rOPINIRole 0.25 milliGRAM(s) Oral three times a day PRN  senna 2 Tablet(s) Oral at bedtime PRN    VITALS:   ICU Vital Signs Last 24 Hrs  T(C): 36.1 (31 Aug 2018 06:12), Max: 36.7 (30 Aug 2018 16:54)  T(F): 97 (31 Aug 2018 06:12), Max: 98.1 (30 Aug 2018 16:54)  HR: 59 (31 Aug 2018 06:12) (47 - 59)  BP: 122/57 (31 Aug 2018 06:12) (106/52 - 135/65)  BP(mean): --  ABP: --  ABP(mean): --  RR: 18 (31 Aug 2018 06:12) (18 - 20)  SpO2: 100% (30 Aug 2018 20:02) (99% - 100%)    LABS:                        9.9    8.65  )-----------( 220      ( 31 Aug 2018 06:45 )             31.6         140  |  106  |  12  ----------------------------<  90  4.2   |  20  |  0.6<L>    Ca    8.4<L>      31 Aug 2018 06:45  Mg     2.0         TPro  6.1  /  Alb  3.2<L>  /  TBili  <0.2  /  DBili  x   /  AST  11  /  ALT  11  /  AlkPhos  101        Urinalysis Basic - ( 30 Aug 2018 17:50 )    Color: Yellow / Appearance: Cloudy / S.015 / pH: x  Gluc: x / Ketone: Negative  / Bili: Negative / Urobili: 0.2 mg/dL   Blood: x / Protein: Negative mg/dL / Nitrite: Positive   Leuk Esterase: Small / RBC: x / WBC 10-25 /HPF   Sq Epi: x / Non Sq Epi: Few /HPF / Bacteria: Many /HPF        Creatine Kinase, Serum: 26 U/L (18 @ 06:45)  Troponin T, Serum: <0.01 ng/mL (18 @ 06:45)  Troponin T, Serum: <0.01 ng/mL (18 @ 17:50)      CARDIAC MARKERS ( 31 Aug 2018 06:45 )  x     / <0.01 ng/mL / 26 U/L / x     / <1.0 ng/mL  CARDIAC MARKERS ( 30 Aug 2018 17:50 )  x     / <0.01 ng/mL / x     / x     / x      < from: 12 Lead ECG (18 @ 17:11) >  Ventricular Rate 49 BPM    Atrial Rate 49 BPM    P-R Interval 180 ms    QRS Duration 80 ms    Q-T Interval 490 ms    QTC Calculation(Bezet) 442 ms    P Axis 63 degrees    R Axis 50 degrees    T Axis 58 degrees    Diagnosis Line Sinus bradycardia  Otherwise normal ECG    < end of copied text >      RADIOLOGY:  < from: Xray Chest 1 View AP/PA (18 @ 18:33) >  Impression:      Bilateral central/perihilar and retrocardiac opacity.    < end of copied text >    PHYSICAL EXAM:  GEN: No acute distress  HEENT: within normal range  LUNGS: B/L rhonchi  HEART: S1/S2 present. RRR.   ABD: Soft, non-tender, non-distended. Bowel sounds present  EXT:no LE edema  NEURO: AAOX3

## 2018-08-31 NOTE — H&P ADULT - PMH
Back pain, chronic    Fibromyalgia    Lyme disease    Polysubstance dependence    Restless leg syndrome

## 2018-09-01 ENCOUNTER — TRANSCRIPTION ENCOUNTER (OUTPATIENT)
Age: 53
End: 2018-09-01

## 2018-09-01 LAB
ALBUMIN SERPL ELPH-MCNC: 3.8 G/DL — SIGNIFICANT CHANGE UP (ref 3.5–5.2)
ALP SERPL-CCNC: 127 U/L — HIGH (ref 30–115)
ALT FLD-CCNC: 12 U/L — SIGNIFICANT CHANGE UP (ref 0–41)
ANION GAP SERPL CALC-SCNC: 16 MMOL/L — HIGH (ref 7–14)
AST SERPL-CCNC: 13 U/L — SIGNIFICANT CHANGE UP (ref 0–41)
BILIRUB SERPL-MCNC: <0.2 MG/DL — SIGNIFICANT CHANGE UP (ref 0.2–1.2)
BUN SERPL-MCNC: 8 MG/DL — LOW (ref 10–20)
CALCIUM SERPL-MCNC: 9.2 MG/DL — SIGNIFICANT CHANGE UP (ref 8.5–10.1)
CHLORIDE SERPL-SCNC: 107 MMOL/L — SIGNIFICANT CHANGE UP (ref 98–110)
CO2 SERPL-SCNC: 19 MMOL/L — SIGNIFICANT CHANGE UP (ref 17–32)
CREAT SERPL-MCNC: 0.8 MG/DL — SIGNIFICANT CHANGE UP (ref 0.7–1.5)
CULTURE RESULTS: NO GROWTH — SIGNIFICANT CHANGE UP
GLUCOSE SERPL-MCNC: 111 MG/DL — HIGH (ref 70–99)
HCT VFR BLD CALC: 36.8 % — LOW (ref 37–47)
HGB BLD-MCNC: 11.6 G/DL — LOW (ref 12–16)
MAGNESIUM SERPL-MCNC: 2 MG/DL — SIGNIFICANT CHANGE UP (ref 1.8–2.4)
MCHC RBC-ENTMCNC: 27 PG — SIGNIFICANT CHANGE UP (ref 27–31)
MCHC RBC-ENTMCNC: 31.5 G/DL — LOW (ref 32–37)
MCV RBC AUTO: 85.8 FL — SIGNIFICANT CHANGE UP (ref 81–99)
NRBC # BLD: 0 /100 WBCS — SIGNIFICANT CHANGE UP (ref 0–0)
PLATELET # BLD AUTO: 237 K/UL — SIGNIFICANT CHANGE UP (ref 130–400)
POTASSIUM SERPL-MCNC: 4.3 MMOL/L — SIGNIFICANT CHANGE UP (ref 3.5–5)
POTASSIUM SERPL-SCNC: 4.3 MMOL/L — SIGNIFICANT CHANGE UP (ref 3.5–5)
PROT SERPL-MCNC: 7.2 G/DL — SIGNIFICANT CHANGE UP (ref 6–8)
RBC # BLD: 4.29 M/UL — SIGNIFICANT CHANGE UP (ref 4.2–5.4)
RBC # FLD: 13.7 % — SIGNIFICANT CHANGE UP (ref 11.5–14.5)
SODIUM SERPL-SCNC: 142 MMOL/L — SIGNIFICANT CHANGE UP (ref 135–146)
SPECIMEN SOURCE: SIGNIFICANT CHANGE UP
WBC # BLD: 7.65 K/UL — SIGNIFICANT CHANGE UP (ref 4.8–10.8)
WBC # FLD AUTO: 7.65 K/UL — SIGNIFICANT CHANGE UP (ref 4.8–10.8)

## 2018-09-01 RX ORDER — TRAZODONE HCL 50 MG
1 TABLET ORAL
Qty: 0 | Refills: 0 | COMMUNITY

## 2018-09-01 RX ORDER — FAMOTIDINE 10 MG/ML
1 INJECTION INTRAVENOUS
Qty: 0 | Refills: 0 | COMMUNITY
Start: 2018-09-01

## 2018-09-01 RX ORDER — SENNA PLUS 8.6 MG/1
2 TABLET ORAL
Qty: 0 | Refills: 0 | DISCHARGE
Start: 2018-09-01

## 2018-09-01 RX ORDER — ZOLPIDEM TARTRATE 10 MG/1
1 TABLET ORAL
Qty: 0 | Refills: 0 | COMMUNITY

## 2018-09-01 RX ORDER — DOCUSATE SODIUM 100 MG
1 CAPSULE ORAL
Qty: 0 | Refills: 0 | DISCHARGE
Start: 2018-09-01

## 2018-09-01 RX ADMIN — Medication 10 MILLIGRAM(S): at 21:40

## 2018-09-01 RX ADMIN — OXYCODONE HYDROCHLORIDE 20 MILLIGRAM(S): 5 TABLET ORAL at 18:30

## 2018-09-01 RX ADMIN — FAMOTIDINE 40 MILLIGRAM(S): 10 INJECTION INTRAVENOUS at 05:21

## 2018-09-01 RX ADMIN — ROPINIROLE 0.25 MILLIGRAM(S): 8 TABLET, FILM COATED, EXTENDED RELEASE ORAL at 09:22

## 2018-09-01 RX ADMIN — ENOXAPARIN SODIUM 40 MILLIGRAM(S): 100 INJECTION SUBCUTANEOUS at 05:21

## 2018-09-01 RX ADMIN — OXYCODONE HYDROCHLORIDE 20 MILLIGRAM(S): 5 TABLET ORAL at 18:08

## 2018-09-01 RX ADMIN — OXYCODONE HYDROCHLORIDE 20 MILLIGRAM(S): 5 TABLET ORAL at 06:18

## 2018-09-01 RX ADMIN — OXYCODONE HYDROCHLORIDE 20 MILLIGRAM(S): 5 TABLET ORAL at 05:20

## 2018-09-01 RX ADMIN — AZITHROMYCIN 255 MILLIGRAM(S): 500 TABLET, FILM COATED ORAL at 05:20

## 2018-09-01 RX ADMIN — Medication 40 MILLIGRAM(S): at 12:37

## 2018-09-01 RX ADMIN — ROPINIROLE 0.25 MILLIGRAM(S): 8 TABLET, FILM COATED, EXTENDED RELEASE ORAL at 00:33

## 2018-09-01 RX ADMIN — Medication 150 MILLIGRAM(S): at 05:20

## 2018-09-01 RX ADMIN — Medication 100 MILLIGRAM(S): at 14:01

## 2018-09-01 RX ADMIN — FAMOTIDINE 40 MILLIGRAM(S): 10 INJECTION INTRAVENOUS at 18:07

## 2018-09-01 RX ADMIN — Medication 150 MILLIGRAM(S): at 18:07

## 2018-09-01 RX ADMIN — CEFTRIAXONE 100 GRAM(S): 500 INJECTION, POWDER, FOR SOLUTION INTRAMUSCULAR; INTRAVENOUS at 05:21

## 2018-09-01 NOTE — DISCHARGE NOTE ADULT - CARE PLAN
Principal Discharge DX:	Pneumonia due to infectious organism, unspecified laterality, unspecified part of lung  Goal:	Treat and prevent complication of pneumonia  Assessment and plan of treatment:	Discharged with levaquin 750 qd for 5 days. Follow up blood culture.  Follow up with PCP Dr. Livingston in 1 week. Follow up with cardio Dr. Malin in 1 week.  Please return to ED if worsening symptoms, fever/chill, SOB, CP, abdominal pain.  Secondary Diagnosis:	Urinary tract infection without hematuria, site unspecified  Goal:	Treat and prevent complication of UTI  Assessment and plan of treatment:	Discharged with levaquin 750 qd for 5 days. Follow up urine culture. Principal Discharge DX:	Pneumonia due to infectious organism, unspecified laterality, unspecified part of lung  Goal:	Treat and prevent complication of pneumonia  Assessment and plan of treatment:	Discharged with levaquin 750 qd for 5 days. Follow up blood culture.  Follow up with PCP Dr. Livingston in 1 week. Follow up with cardio Dr. Malin in 1 week. F/U with pulmonary out patient in 1 week.  Please return to ED if worsening symptoms, fever/chill, SOB, CP, abdominal pain.  Secondary Diagnosis:	Urinary tract infection without hematuria, site unspecified  Goal:	Treat and prevent complication of UTI  Assessment and plan of treatment:	Discharged with levaquin 750 qd for 7 days. Follow up urine culture.

## 2018-09-01 NOTE — DISCHARGE NOTE ADULT - CARE PROVIDER_API CALL
Bony Livingston), Family Medicine  [address] [facility]  [address]  [place] [address]  Phone: [phone]  Fax: [phone]    Massimo Malin), Cardiovascular Disease; Internal Medicine; Interventional Cardiology  [address] [place] [address]  [address]  [place] [address]  Phone: [phone]  Fax: [phone] Bony Livingston), Family Medicine  [address] [facility]  [address]  [place] [address]  Phone: [phone]  Fax: [phone]    Massimo Malin), Cardiovascular Disease; Internal Medicine; Interventional Cardiology  [address] [place] [address]  [address]  [place] [address]  Phone: [phone]  Fax: [phone]    Yoselyn Wyatt), Internal Medicine  [address] [place] [address]  Phone: [phone]  Fax: [phone]

## 2018-09-01 NOTE — DISCHARGE NOTE ADULT - MEDICATION SUMMARY - MEDICATIONS TO TAKE
I will START or STAY ON the medications listed below when I get home from the hospital:    oxyCODONE 10 mg oral tablet  -- 3 tab(s) by mouth every 8 hours, As Needed  -- Indication: For Pain    Lyrica 150 mg oral capsule  -- 1 cap(s) by mouth 2 times a day  -- Indication: For Pain    diazePAM 10 mg oral tablet  -- 1 tab(s) by mouth 3 times a day  -- Indication: For Pain    FLUoxetine 40 mg oral capsule  -- 1 cap(s) by mouth once a day  -- Indication: For depression    rOPINIRole 0.25 mg oral tablet  -- 1 tab(s) by mouth 3 times a day, As Needed  -- Indication: For Restless leg syndrome    famotidine 40 mg oral tablet  -- 1 tab(s) by mouth 2 times a day  -- Indication: For gerd    docusate sodium 100 mg oral capsule  -- 1 cap(s) by mouth 3 times a day  -- Indication: For Constipation    senna oral tablet  -- 2 tab(s) by mouth once a day (at bedtime), As needed, Constipation  -- Indication: For Constipation    Levaquin 750 mg oral tablet  -- 1 tab(s) by mouth once a day   -- Avoid prolonged or excessive exposure to direct and/or artificial sunlight while taking this medication.  Do not take dairy products, antacids, or iron preparations within one hour of this medication.  Finish all this medication unless otherwise directed by prescriber.  May cause drowsiness or dizziness.  Medication should be taken with plenty of water.    -- Indication: For CAP, UTI I will START or STAY ON the medications listed below when I get home from the hospital:    oxyCODONE 10 mg oral tablet  -- 3 tab(s) by mouth every 8 hours, As Needed  -- Indication: For Pain    Lyrica 150 mg oral capsule  -- 1 cap(s) by mouth 2 times a day  -- Indication: For Pain    diazePAM 10 mg oral tablet  -- 1 tab(s) by mouth 3 times a day  -- Indication: For Pain    FLUoxetine 40 mg oral capsule  -- 1 cap(s) by mouth once a day  -- Indication: For depression    rOPINIRole 0.25 mg oral tablet  -- 1 tab(s) by mouth 3 times a day, As Needed  -- Indication: For Restless leg syndrome    famotidine 40 mg oral tablet  -- 1 tab(s) by mouth 2 times a day  -- Indication: For gerd    docusate sodium 100 mg oral capsule  -- 1 cap(s) by mouth 3 times a day  -- Indication: For Constipation    senna oral tablet  -- 2 tab(s) by mouth once a day (at bedtime), As needed, Constipation  -- Indication: For Constipation    Levaquin 750 mg oral tablet  -- 1 tab(s) by mouth once a day for 7 days  -- Avoid prolonged or excessive exposure to direct and/or artificial sunlight while taking this medication.  Do not take dairy products, antacids, or iron preparations within one hour of this medication.  Finish all this medication unless otherwise directed by prescriber.  May cause drowsiness or dizziness.  Medication should be taken with plenty of water.    -- Indication: For Pneumonia due to infectious organism, unspecified laterality, unspecified part of lung

## 2018-09-01 NOTE — PROGRESS NOTE ADULT - ASSESSMENT
52 yo F with PMH of Lyme disease, fibromyalgia, chronic back pain, restless leg syndrome, polysubstance use presented to ED with 1 week of nightly fevers, cough and shortness of breath. In ED, CXR showed b/l opacities, therefore patient was admitted for possible pneumonia.     #) CAP   - c/w Rocephin + Azithro  - f/u blood cultures and sputum culture  - f/u ECHO   -discharge with levaquin 750 5 days    #) hx of bradycardia  cardio- likely 2/2 opioids and SSRI intake. Avoid BB and CCB. f/u outpt cardio  f/u Utox    #) Fibromyalgia - c/w Prozac + Valium    #) Chronic back pain - c/w Oxycodone + Lyrica.      #) Restless leg syndrome - c/w Ropinirole    DVT ppx: Lovenox subQ    Diet: regular  Activity: ambulate as tolerated  Code status: FULL  Dispo: anticipate home  Possible d/c tomorrow

## 2018-09-01 NOTE — DISCHARGE NOTE ADULT - MEDICATION SUMMARY - MEDICATIONS TO STOP TAKING
I will STOP taking the medications listed below when I get home from the hospital:    Ambien 10 mg oral tablet  -- 1 tab(s) by mouth once a day (at bedtime), As Needed    traZODone 100 mg oral tablet  -- 1 tab(s) by mouth 3 times a day, As Needed    Fioricet oral tablet  -- 1 tab(s) by mouth every 4 hours, As Needed

## 2018-09-01 NOTE — DISCHARGE NOTE ADULT - HOSPITAL COURSE
52 yo F with PMH of Lyme disease, fibromyalgia, chronic back pain, restless leg syndrome, polysubstance use presented to ED with 1 week of nightly fevers. Patient has multiple vague complaints in addition to her fevers including chills, shortness of breath, cough, chest pain, palpitations, and abdominal pain. Also reports some nausea and constipation. Patient reports she was last seen at [place] after syncopal episode. States her HR was "20". Was discharged with instructions to follow up with Cardiologist (Dr Malin). Was unable to do so because Dr Malin is away on vacation. States she has been feeling unwell since [place] visit last Monday. However, current symptoms started this past Sunday. She did not seek medical attention earlier because her son's birthday was this past Wednesday and she wanted to wait. Finally went into see PCP day PTP who recommended that she come to hospital. She is on a number of pain medications for her back pain as well as Prozac + Valium. She has been admitted to [facility] in past (2015) for suspected overdose. Family believes patient may be taking prescription pills from other people (though patient denies it).Active smoker - currently 1/2 PPD x >30 years.  She was admitted for CAP treated with Rocephin + Azithro. UA was + for UTI. She will be discharged with levaquin 750 qd for 5 days for PNA and UTI. Please f/u blood cultures and urine culture.  Patient has hx of bradycardia. Per cardio, likely 2/2 opioids and SSRI intake. Avoid BB and CCB. Follow up Utox. ECHO showed......................  VS and labs stable. Patient stable. She will be discharged home. Please follow up with cardiology Dr. Malin and PCP Dr. Malin in 1 week.

## 2018-09-01 NOTE — PROGRESS NOTE ADULT - ASSESSMENT
54 yo F with PMH of Lyme disease, fibromyalgia, chronic back pain, restless leg syndrome, polysubstance use presented to ED with 1 week of nightly fevers, cough and shortness of breath. In ED, CXR showed b/l opacities, therefore patient was admitted for possible pneumonia.     #) CAP   - c/w Rocephin + Azithro  - f/u blood cultures and sputum culture    #) hx of bradycardia - cardio eval noted     #) Fibromyalgia - c/w Prozac + Valium    #) Chronic back pain - c/w Oxycodone + Lyrica.      #) Restless leg syndrome - c/w Ropinirole    DVT ppx: Lovenox subQ 52 yo F with PMH of Lyme disease, fibromyalgia, chronic back pain, restless leg syndrome, polysubstance use presented to ED with 1 week of nightly fevers, cough and shortness of breath. In ED, CXR showed b/l opacities, therefore patient was admitted for possible pneumonia.     #) CAP   - c/w Rocephin + Azithro  - f/u blood cultures and sputum culture  - f/u ECHO     #) hx of bradycardia - cardio eval noted     #) Fibromyalgia - c/w Prozac + Valium    #) Chronic back pain - c/w Oxycodone + Lyrica.      #) Restless leg syndrome - c/w Ropinirole    DVT ppx: Lovenox subQ

## 2018-09-01 NOTE — DISCHARGE NOTE ADULT - PROVIDER TOKENS
TOKMIS:'[id number]',TOKEN:'[id number]' TOKEN:'[id number]:'[id number]',TOKEN:'[id number]'

## 2018-09-01 NOTE — DISCHARGE NOTE ADULT - PATIENT PORTAL LINK FT
You can access the [facility] Patient Portal, offered by [facility], by registering with the following website: [email or web address]

## 2018-09-01 NOTE — DISCHARGE NOTE ADULT - CARE PROVIDERS DIRECT ADDRESSES
[email or web address]_Unknown [email or web address],DirectAddress_Unknown,DirectAddress_Unknown

## 2018-09-01 NOTE — DISCHARGE NOTE ADULT - PLAN OF CARE
Treat and prevent complication of pneumonia Discharged with levaquin 750 qd for 5 days. Follow up blood culture.  Follow up with PCP Dr. Livingston in 1 week. Follow up with cardio Dr. Malin in 1 week.  Please return to ED if worsening symptoms, fever/chill, SOB, CP, abdominal pain. Treat and prevent complication of UTI Discharged with levaquin 750 qd for 5 days. Follow up urine culture. Discharged with levaquin 750 qd for 5 days. Follow up blood culture.  Follow up with PCP Dr. Livingston in 1 week. Follow up with cardio Dr. Malin in 1 week. F/U with pulmonary out patient in 1 week.  Please return to ED if worsening symptoms, fever/chill, SOB, CP, abdominal pain. Discharged with levaquin 750 qd for 7 days. Follow up urine culture.

## 2018-09-02 VITALS
DIASTOLIC BLOOD PRESSURE: 73 MMHG | RESPIRATION RATE: 18 BRPM | SYSTOLIC BLOOD PRESSURE: 118 MMHG | TEMPERATURE: 97 F | HEART RATE: 66 BPM

## 2018-09-02 LAB
ALBUMIN SERPL ELPH-MCNC: 3.6 G/DL — SIGNIFICANT CHANGE UP (ref 3.5–5.2)
ALP SERPL-CCNC: 125 U/L — HIGH (ref 30–115)
ALT FLD-CCNC: 11 U/L — SIGNIFICANT CHANGE UP (ref 0–41)
ANION GAP SERPL CALC-SCNC: 14 MMOL/L — SIGNIFICANT CHANGE UP (ref 7–14)
AST SERPL-CCNC: 13 U/L — SIGNIFICANT CHANGE UP (ref 0–41)
BILIRUB SERPL-MCNC: <0.2 MG/DL — SIGNIFICANT CHANGE UP (ref 0.2–1.2)
BUN SERPL-MCNC: 12 MG/DL — SIGNIFICANT CHANGE UP (ref 10–20)
CALCIUM SERPL-MCNC: 9.1 MG/DL — SIGNIFICANT CHANGE UP (ref 8.5–10.1)
CHLORIDE SERPL-SCNC: 104 MMOL/L — SIGNIFICANT CHANGE UP (ref 98–110)
CO2 SERPL-SCNC: 23 MMOL/L — SIGNIFICANT CHANGE UP (ref 17–32)
CREAT SERPL-MCNC: 1 MG/DL — SIGNIFICANT CHANGE UP (ref 0.7–1.5)
GLUCOSE SERPL-MCNC: 84 MG/DL — SIGNIFICANT CHANGE UP (ref 70–99)
HCT VFR BLD CALC: 37.6 % — SIGNIFICANT CHANGE UP (ref 37–47)
HGB BLD-MCNC: 11.9 G/DL — LOW (ref 12–16)
MAGNESIUM SERPL-MCNC: 2.3 MG/DL — SIGNIFICANT CHANGE UP (ref 1.8–2.4)
MCHC RBC-ENTMCNC: 26.9 PG — LOW (ref 27–31)
MCHC RBC-ENTMCNC: 31.6 G/DL — LOW (ref 32–37)
MCV RBC AUTO: 85.1 FL — SIGNIFICANT CHANGE UP (ref 81–99)
NRBC # BLD: 0 /100 WBCS — SIGNIFICANT CHANGE UP (ref 0–0)
PLATELET # BLD AUTO: 261 K/UL — SIGNIFICANT CHANGE UP (ref 130–400)
POTASSIUM SERPL-MCNC: 5 MMOL/L — SIGNIFICANT CHANGE UP (ref 3.5–5)
POTASSIUM SERPL-SCNC: 5 MMOL/L — SIGNIFICANT CHANGE UP (ref 3.5–5)
PROT SERPL-MCNC: 7 G/DL — SIGNIFICANT CHANGE UP (ref 6–8)
RBC # BLD: 4.42 M/UL — SIGNIFICANT CHANGE UP (ref 4.2–5.4)
RBC # FLD: 13.6 % — SIGNIFICANT CHANGE UP (ref 11.5–14.5)
SODIUM SERPL-SCNC: 141 MMOL/L — SIGNIFICANT CHANGE UP (ref 135–146)
WBC # BLD: 8.52 K/UL — SIGNIFICANT CHANGE UP (ref 4.8–10.8)
WBC # FLD AUTO: 8.52 K/UL — SIGNIFICANT CHANGE UP (ref 4.8–10.8)

## 2018-09-02 RX ORDER — IBUPROFEN 200 MG
400 TABLET ORAL ONCE
Qty: 0 | Refills: 0 | Status: COMPLETED | OUTPATIENT
Start: 2018-09-02 | End: 2018-09-02

## 2018-09-02 RX ADMIN — Medication 150 MILLIGRAM(S): at 06:09

## 2018-09-02 RX ADMIN — CHLORHEXIDINE GLUCONATE 1 APPLICATION(S): 213 SOLUTION TOPICAL at 06:10

## 2018-09-02 RX ADMIN — OXYCODONE HYDROCHLORIDE 20 MILLIGRAM(S): 5 TABLET ORAL at 08:03

## 2018-09-02 RX ADMIN — CEFTRIAXONE 100 GRAM(S): 500 INJECTION, POWDER, FOR SOLUTION INTRAMUSCULAR; INTRAVENOUS at 06:09

## 2018-09-02 RX ADMIN — Medication 400 MILLIGRAM(S): at 01:26

## 2018-09-02 RX ADMIN — AZITHROMYCIN 255 MILLIGRAM(S): 500 TABLET, FILM COATED ORAL at 06:09

## 2018-09-02 RX ADMIN — OXYCODONE HYDROCHLORIDE 20 MILLIGRAM(S): 5 TABLET ORAL at 06:10

## 2018-09-02 RX ADMIN — FAMOTIDINE 40 MILLIGRAM(S): 10 INJECTION INTRAVENOUS at 06:05

## 2018-09-02 RX ADMIN — ENOXAPARIN SODIUM 40 MILLIGRAM(S): 100 INJECTION SUBCUTANEOUS at 06:05

## 2018-09-02 NOTE — PROGRESS NOTE ADULT - SUBJECTIVE AND OBJECTIVE BOX
Pt seen and examined at bedside. Reports feeling better. No events overnight. Planned for discharge today.     VITAL SIGNS (Last 24 hrs):  T(C): 36.1 (09-02-18 @ 05:56), Max: 36.3 (09-01-18 @ 14:31)  HR: 66 (09-02-18 @ 05:56) (65 - 71)  BP: 118/73 (09-02-18 @ 05:56) (118/73 - 134/73)  RR: 18 (09-02-18 @ 05:56) (18 - 20)  SpO2: --  Wt(kg): --  Daily     Daily     I&O's Summary    01 Sep 2018 07:01  -  02 Sep 2018 07:00  --------------------------------------------------------  IN: 210 mL / OUT: 0 mL / NET: 210 mL        PHYSICAL EXAM:  GENERAL: NAD, well-developed  HEAD:  Atraumatic, Normocephalic  EYES: EOMI, PERRLA, conjunctiva and sclera clear  NECK: Supple, No JVD  CHEST/LUNG: Clear to auscultation bilaterally; No wheeze  HEART: Regular rate and rhythm; No murmurs, rubs, or gallops  ABDOMEN: Soft, Nontender, Nondistended; Bowel sounds present  EXTREMITIES:  2+ Peripheral Pulses, No clubbing, cyanosis, or edema  PSYCH: AAOx3  NEUROLOGY: non-focal  SKIN: No rashes or lesions    Labs Reviewed  Spoke to patient in regards to abnormal labs.    CBC Full  -  ( 01 Sep 2018 09:00 )  WBC Count : 7.65 K/uL  Hemoglobin : 11.6 g/dL  Hematocrit : 36.8 %  Platelet Count - Automated : 237 K/uL  Mean Cell Volume : 85.8 fL  Mean Cell Hemoglobin : 27.0 pg  Mean Cell Hemoglobin Concentration : 31.5 g/dL  Auto Neutrophil # : x  Auto Lymphocyte # : x  Auto Monocyte # : x  Auto Eosinophil # : x  Auto Basophil # : x  Auto Neutrophil % : x  Auto Lymphocyte % : x  Auto Monocyte % : x  Auto Eosinophil % : x  Auto Basophil % : x    BMP:    09-01 @ 09:00    Blood Urea Nitrogen - 8  Calcium - 9.2  Carbond Dioxide - 19  Chloride - 107  Creatinine - 0.8  Glucose - 111  Potassium - 4.3  Sodium - 142      Hemoglobin A1c -     Urine Culture:  08-31 @ 07:32 Urine culture: --    Culture Results:   No growth  Method Type: --  Organism: --  Organism Identification: --  Specimen Source: .Urine Clean Catch (Midstream)  08-31 @ 06:45 Urine culture: --    Culture Results:   No growth to date.  Method Type: --  Organism: --  Organism Identification: --  Specimen Source: .Blood None      MEDICATIONS  (STANDING):  azithromycin  IVPB 500 milliGRAM(s) IV Intermittent every 24 hours  cefTRIAXone   IVPB 1 Gram(s) IV Intermittent every 24 hours  chlorhexidine 4% Liquid 1 Application(s) Topical <User Schedule>  docusate sodium 100 milliGRAM(s) Oral three times a day  enoxaparin Injectable 40 milliGRAM(s) SubCutaneous every 24 hours  famotidine    Tablet 40 milliGRAM(s) Oral two times a day  FLUoxetine 40 milliGRAM(s) Oral daily  oxyCODONE  ER Tablet 20 milliGRAM(s) Oral every 12 hours  pregabalin 150 milliGRAM(s) Oral two times a day  sodium chloride 0.9%. 1000 milliLiter(s) (250 mL/Hr) IV Continuous <Continuous>    MEDICATIONS  (PRN):  diazepam    Tablet 10 milliGRAM(s) Oral three times a day PRN anxiety/agitation  rOPINIRole 0.25 milliGRAM(s) Oral three times a day PRN leg pain/irritation  senna 2 Tablet(s) Oral at bedtime PRN Constipation
Pt seen and examined at bedside. Reports feeling better. No events overnight.     VITAL SIGNS (Last 24 hrs):  T(C): 36.3 (09-01-18 @ 14:31), Max: 36.6 (08-31-18 @ 20:52)  HR: 71 (09-01-18 @ 14:31) (56 - 71)  BP: 132/71 (09-01-18 @ 14:31) (132/71 - 163/87)  RR: 20 (09-01-18 @ 14:31) (18 - 20)  SpO2: 100% (08-31-18 @ 21:34) (100% - 100%)  Wt(kg): --  Daily     Daily     I&O's Summary    01 Sep 2018 07:01  -  01 Sep 2018 14:44  --------------------------------------------------------  IN: 210 mL / OUT: 0 mL / NET: 210 mL        PHYSICAL EXAM:  GENERAL: NAD, well-developed  HEAD:  Atraumatic, Normocephalic  EYES: EOMI, PERRLA, conjunctiva and sclera clear  NECK: Supple, No JVD  CHEST/LUNG: Clear to auscultation bilaterally; No wheeze  HEART: Regular rate and rhythm; No murmurs, rubs, or gallops  ABDOMEN: Soft, Nontender, Nondistended; Bowel sounds present  EXTREMITIES:  2+ Peripheral Pulses, No clubbing, cyanosis, or edema  PSYCH: AAOx3  NEUROLOGY: non-focal  SKIN: No rashes or lesions    Labs Reviewed  Spoke to patient in regards to abnormal labs.    CBC Full  -  ( 01 Sep 2018 09:00 )  WBC Count : 7.65 K/uL  Hemoglobin : 11.6 g/dL  Hematocrit : 36.8 %  Platelet Count - Automated : 237 K/uL  Mean Cell Volume : 85.8 fL  Mean Cell Hemoglobin : 27.0 pg  Mean Cell Hemoglobin Concentration : 31.5 g/dL  Auto Neutrophil # : x  Auto Lymphocyte # : x  Auto Monocyte # : x  Auto Eosinophil # : x  Auto Basophil # : x  Auto Neutrophil % : x  Auto Lymphocyte % : x  Auto Monocyte % : x  Auto Eosinophil % : x  Auto Basophil % : x    BMP:    09-01 @ 09:00    Blood Urea Nitrogen - 8  Calcium - 9.2  Carbond Dioxide - 19  Chloride - 107  Creatinine - 0.8  Glucose - 111  Potassium - 4.3  Sodium - 142      Hemoglobin A1c -        MEDICATIONS  (STANDING):  azithromycin  IVPB 500 milliGRAM(s) IV Intermittent every 24 hours  cefTRIAXone   IVPB 1 Gram(s) IV Intermittent every 24 hours  chlorhexidine 4% Liquid 1 Application(s) Topical <User Schedule>  docusate sodium 100 milliGRAM(s) Oral three times a day  enoxaparin Injectable 40 milliGRAM(s) SubCutaneous every 24 hours  famotidine    Tablet 40 milliGRAM(s) Oral two times a day  FLUoxetine 40 milliGRAM(s) Oral daily  oxyCODONE  ER Tablet 20 milliGRAM(s) Oral every 12 hours  pregabalin 150 milliGRAM(s) Oral two times a day  sodium chloride 0.9%. 1000 milliLiter(s) (250 mL/Hr) IV Continuous <Continuous>    MEDICATIONS  (PRN):  diazepam    Tablet 10 milliGRAM(s) Oral three times a day PRN anxiety/agitation  rOPINIRole 0.25 milliGRAM(s) Oral three times a day PRN leg pain/irritation  senna 2 Tablet(s) Oral at bedtime PRN Constipation
SUBJECTIVE:    Patient is a 53y old Female who presents with a chief complaint of pneumonia (31 Aug 2018 00:08)    Currently admitted to medicine with the primary diagnosis of Pneumonia of right lower lobe due to infectious organism     Today is hospital day 2d. Overnight no event. Denies SOB. L CP improving, worse with palpation. Ambulating around unit. Suprapubic pain improving.     PAST MEDICAL & SURGICAL HISTORY  Restless leg syndrome  Polysubstance dependence  Back pain, chronic  Lyme disease  Fibromyalgia  History of gastric surgery  Fracture, radius  History of  section  History of appendectomy    SOCIAL HISTORY:  Negative for smoking/alcohol/drug use.     ALLERGIES:  No Known Allergies    MEDICATIONS:  STANDING MEDICATIONS  azithromycin  IVPB 500 milliGRAM(s) IV Intermittent every 24 hours  cefTRIAXone   IVPB 1 Gram(s) IV Intermittent every 24 hours  chlorhexidine 4% Liquid 1 Application(s) Topical <User Schedule>  docusate sodium 100 milliGRAM(s) Oral three times a day  enoxaparin Injectable 40 milliGRAM(s) SubCutaneous every 24 hours  famotidine    Tablet 40 milliGRAM(s) Oral two times a day  FLUoxetine 40 milliGRAM(s) Oral daily  oxyCODONE  ER Tablet 20 milliGRAM(s) Oral every 12 hours  pregabalin 150 milliGRAM(s) Oral two times a day  sodium chloride 0.9%. 1000 milliLiter(s) IV Continuous <Continuous>    PRN MEDICATIONS  diazepam    Tablet 10 milliGRAM(s) Oral three times a day PRN  rOPINIRole 0.25 milliGRAM(s) Oral three times a day PRN  senna 2 Tablet(s) Oral at bedtime PRN    VITALS:   T(F): 97.3  HR: 71  BP: 132/71  RR: 20  SpO2: 100%    LABS:                        11.6   7.65  )-----------( 237      ( 01 Sep 2018 09:00 )             36.8         142  |  107  |  8<L>  ----------------------------<  111<H>  4.3   |  19  |  0.8    Ca    9.2      01 Sep 2018 09:00  Mg     2.0         TPro  7.2  /  Alb  3.8  /  TBili  <0.2  /  DBili  x   /  AST  13  /  ALT  12  /  AlkPhos  127<H>                Culture - Blood (collected 31 Aug 2018 06:45)  Source: .Blood None  Preliminary Report (01 Sep 2018 11:01):    No growth to date.    Culture - Blood (collected 31 Aug 2018 06:45)  Source: .Blood None  Preliminary Report (01 Sep 2018 11:01):    No growth to date.          CARDIAC MARKERS ( 31 Aug 2018 06:45 )  x     / <0.01 ng/mL / 26 U/L / x     / <1.0 ng/mL      RADIOLOGY:  < from: Xray Chest 2 Views PA/Lat (18 @ 18:39) >  Bilateral lower lung field opacities.    < end of copied text >    PHYSICAL EXAM:  GENERAL: NAD, speaks in full sentences, no signs of respiratory distress  HEAD:  Atraumatic, Normocephalic  EYES: EOMI, PERRLA, conjunctiva and sclera clear  NECK: Supple, No JVD  CHEST/LUNG: CTAB; No wheeze; No crackles; No accessory muscles used  HEART: Regular rate and rhythm; No murmurs;   ABDOMEN: Soft, Nontender, Nondistended; Bowel sounds present; No guarding  EXTREMITIES:  2+ Peripheral Pulses, No cyanosis or edema  PSYCH: AAOx3  NEUROLOGY: non-focal  SKIN: No rashes or lesions    Intravenous access:   NG tube:   [place] Catheter:

## 2018-09-02 NOTE — PROGRESS NOTE ADULT - ASSESSMENT
54 yo F with PMH of Lyme disease, fibromyalgia, chronic back pain, restless leg syndrome, polysubstance use presented to ED with 1 week of nightly fevers, cough and shortness of breath. In ED, CXR showed b/l opacities, therefore patient was admitted for possible pneumonia.     #) CAP   - on Rocephin + Azithro  - switch to Levaquin 750mg daily to complete 7 day course   - pulm f/u outpatient     #) hx of bradycardia - cardio eval noted,  no bradycardia on telemetry     #) Fibromyalgia - c/w Prozac + Valium    #) Chronic back pain - c/w Oxycodone + Lyrica.      #) Restless leg syndrome - c/w Ropinirole    DVT ppx: Lovenox subQ

## 2018-09-05 LAB
CULTURE RESULTS: SIGNIFICANT CHANGE UP
CULTURE RESULTS: SIGNIFICANT CHANGE UP
SPECIMEN SOURCE: SIGNIFICANT CHANGE UP
SPECIMEN SOURCE: SIGNIFICANT CHANGE UP

## 2018-09-06 DIAGNOSIS — G89.29 OTHER CHRONIC PAIN: ICD-10-CM

## 2018-09-06 DIAGNOSIS — G25.81 RESTLESS LEGS SYNDROME: ICD-10-CM

## 2018-09-06 DIAGNOSIS — A69.20 LYME DISEASE, UNSPECIFIED: ICD-10-CM

## 2018-09-06 DIAGNOSIS — F17.200 NICOTINE DEPENDENCE, UNSPECIFIED, UNCOMPLICATED: ICD-10-CM

## 2018-09-06 DIAGNOSIS — N39.0 URINARY TRACT INFECTION, SITE NOT SPECIFIED: ICD-10-CM

## 2018-09-06 DIAGNOSIS — M79.7 FIBROMYALGIA: ICD-10-CM

## 2018-09-06 DIAGNOSIS — J18.9 PNEUMONIA, UNSPECIFIED ORGANISM: ICD-10-CM

## 2018-09-06 DIAGNOSIS — R11.0 NAUSEA: ICD-10-CM

## 2018-09-06 DIAGNOSIS — R00.1 BRADYCARDIA, UNSPECIFIED: ICD-10-CM

## 2018-09-06 DIAGNOSIS — R50.9 FEVER, UNSPECIFIED: ICD-10-CM

## 2018-09-06 DIAGNOSIS — M54.9 DORSALGIA, UNSPECIFIED: ICD-10-CM

## 2018-09-06 DIAGNOSIS — F13.10 SEDATIVE, HYPNOTIC OR ANXIOLYTIC ABUSE, UNCOMPLICATED: ICD-10-CM

## 2018-09-06 DIAGNOSIS — K59.00 CONSTIPATION, UNSPECIFIED: ICD-10-CM

## 2018-09-06 DIAGNOSIS — I49.3 VENTRICULAR PREMATURE DEPOLARIZATION: ICD-10-CM

## 2018-09-09 LAB
ALFENTANIL UR QUANT: SIGNIFICANT CHANGE UP NG/MG CREAT
AMOBARBITAL UR CFM-MCNC: SIGNIFICANT CHANGE UP NG/MG CREAT
AMOBARBITAL UR CFM-MCNC: SIGNIFICANT CHANGE UP NG/ML
AMPHET UR CFM-MCNC: SIGNIFICANT CHANGE UP NG/MG CREAT
AMPHETAMINES UR CFM-MCNC: NEGATIVE — SIGNIFICANT CHANGE UP
BUPRENORPHINE UR CONFIRMATION: NEGATIVE — SIGNIFICANT CHANGE UP
BUPRENORPHINE UR QUANT: SIGNIFICANT CHANGE UP NG/MG CREAT
BUTABARBITAL UR QL SCN: SIGNIFICANT CHANGE UP NG/MG CREAT
BUTALBITAL UR QL SCN: 3925 NG/ML — SIGNIFICANT CHANGE UP
BUTALBITAL UR QL SCN: 5097 NG/MG CREAT — SIGNIFICANT CHANGE UP
CODEINE UR CFM-MCNC: SIGNIFICANT CHANGE UP NG/MG CREAT
DHC UR-MCNC: SIGNIFICANT CHANGE UP NG/MG CREAT
EDDP UR CFM-MCNC: SIGNIFICANT CHANGE UP NG/MG CREAT
FENTANYL UR CFM-MCNC: NEGATIVE — SIGNIFICANT CHANGE UP
FENTANYL UR CFM-MCNC: SIGNIFICANT CHANGE UP NG/MG CREAT
HYDROCODONE UR CFM-MCNC: SIGNIFICANT CHANGE UP NG/MG CREAT
HYDROMORPHONE UR CFM-MCNC: SIGNIFICANT CHANGE UP NG/MG CREAT
MDA UR QL SCN: SIGNIFICANT CHANGE UP NG/MG CREAT
MDMA UR QL SCN: SIGNIFICANT CHANGE UP NG/MG CREAT
METHADONE UR CFM-MCNC: NEGATIVE — SIGNIFICANT CHANGE UP
METHADONE UR CFM-MCNC: SIGNIFICANT CHANGE UP NG/MG CREAT
METHAMPHET UR QL SCN: SIGNIFICANT CHANGE UP NG/MG CREAT
MORPHINE UR CFM-MCNC: 2692 NG/MG CREAT — SIGNIFICANT CHANGE UP
NORBUPRENORPHINE QUANT UR: SIGNIFICANT CHANGE UP NG/MG CREAT
NORCODEINE UR-MCNC: SIGNIFICANT CHANGE UP NG/MG CREAT
NORFENTANYL UR-MCNC: SIGNIFICANT CHANGE UP NG/MG CREAT
NORHYDROCODONE UR CFM-MCNC: SIGNIFICANT CHANGE UP NG/MG CREAT
NOROXYCODONE UR CFM-MCNC: 748 NG/MG CREAT — SIGNIFICANT CHANGE UP
NOROXYMORPHONE UR CFM-MCNC: 71 NG/MG CREAT — SIGNIFICANT CHANGE UP
OPIATES UR CFM-MCNC: ABNORMAL
OXYCODONE UR-MCNC: ABNORMAL
OXYCODONE UR-MCNC: SIGNIFICANT CHANGE UP NG/MG CREAT
OXYMORPHONE UR CFM-MCNC: 213 NG/MG CREAT — SIGNIFICANT CHANGE UP
PENTOBARB UR QL SCN: SIGNIFICANT CHANGE UP NG/MG CREAT
PENTOBARB UR QL SCN: SIGNIFICANT CHANGE UP NG/ML
PHENOBARBITAL UR QUANT: SIGNIFICANT CHANGE UP NG/MG CREAT
RESULT: ABNORMAL
SECOBARBITAL UR CFM-MCNC: SIGNIFICANT CHANGE UP NG/MG CREAT
SECOBARBITAL UR CFM-MCNC: SIGNIFICANT CHANGE UP NG/ML
SUFENTANIL UR QUANT: SIGNIFICANT CHANGE UP NG/MG CREAT
TAPENTADOL UR CONFIRMATION: NEGATIVE — SIGNIFICANT CHANGE UP
TAPENTADOL UR QUANT: SIGNIFICANT CHANGE UP NG/MG CREAT

## 2018-12-18 ENCOUNTER — INPATIENT (INPATIENT)
Facility: HOSPITAL | Age: 53
LOS: 0 days | Discharge: LEFT BEFORE DISCHARGE | End: 2018-12-19
Attending: INTERNAL MEDICINE | Admitting: INTERNAL MEDICINE

## 2018-12-18 VITALS
OXYGEN SATURATION: 98 % | DIASTOLIC BLOOD PRESSURE: 60 MMHG | TEMPERATURE: 97 F | RESPIRATION RATE: 16 BRPM | SYSTOLIC BLOOD PRESSURE: 101 MMHG | HEART RATE: 60 BPM

## 2018-12-18 DIAGNOSIS — Z98.891 HISTORY OF UTERINE SCAR FROM PREVIOUS SURGERY: Chronic | ICD-10-CM

## 2018-12-18 DIAGNOSIS — Z90.49 ACQUIRED ABSENCE OF OTHER SPECIFIED PARTS OF DIGESTIVE TRACT: Chronic | ICD-10-CM

## 2018-12-18 DIAGNOSIS — S52.90XA UNSPECIFIED FRACTURE OF UNSPECIFIED FOREARM, INITIAL ENCOUNTER FOR CLOSED FRACTURE: Chronic | ICD-10-CM

## 2018-12-18 DIAGNOSIS — Z98.890 OTHER SPECIFIED POSTPROCEDURAL STATES: Chronic | ICD-10-CM

## 2018-12-18 PROBLEM — F19.20 OTHER PSYCHOACTIVE SUBSTANCE DEPENDENCE, UNCOMPLICATED: Chronic | Status: ACTIVE | Noted: 2018-08-31

## 2018-12-18 PROBLEM — M54.9 DORSALGIA, UNSPECIFIED: Chronic | Status: ACTIVE | Noted: 2018-08-31

## 2018-12-18 PROBLEM — A69.20 LYME DISEASE, UNSPECIFIED: Chronic | Status: ACTIVE | Noted: 2018-08-31

## 2018-12-18 PROBLEM — G25.81 RESTLESS LEGS SYNDROME: Chronic | Status: ACTIVE | Noted: 2018-08-31

## 2018-12-18 PROBLEM — M79.7 FIBROMYALGIA: Chronic | Status: ACTIVE | Noted: 2018-08-30

## 2018-12-18 LAB
ALBUMIN SERPL ELPH-MCNC: 4 G/DL — SIGNIFICANT CHANGE UP (ref 3.5–5.2)
ALP SERPL-CCNC: 125 U/L — HIGH (ref 30–115)
ALT FLD-CCNC: 11 U/L — SIGNIFICANT CHANGE UP (ref 0–41)
ANION GAP SERPL CALC-SCNC: 17 MMOL/L — HIGH (ref 7–14)
APTT BLD: 29.7 SEC — SIGNIFICANT CHANGE UP (ref 27–39.2)
AST SERPL-CCNC: 21 U/L — SIGNIFICANT CHANGE UP (ref 0–41)
BASOPHILS # BLD AUTO: 0.05 K/UL — SIGNIFICANT CHANGE UP (ref 0–0.2)
BASOPHILS NFR BLD AUTO: 0.6 % — SIGNIFICANT CHANGE UP (ref 0–1)
BILIRUB SERPL-MCNC: 0.3 MG/DL — SIGNIFICANT CHANGE UP (ref 0.2–1.2)
BUN SERPL-MCNC: 14 MG/DL — SIGNIFICANT CHANGE UP (ref 10–20)
CALCIUM SERPL-MCNC: 8.6 MG/DL — SIGNIFICANT CHANGE UP (ref 8.5–10.1)
CHLORIDE SERPL-SCNC: 104 MMOL/L — SIGNIFICANT CHANGE UP (ref 98–110)
CO2 SERPL-SCNC: 19 MMOL/L — SIGNIFICANT CHANGE UP (ref 17–32)
CREAT SERPL-MCNC: 1.2 MG/DL — SIGNIFICANT CHANGE UP (ref 0.7–1.5)
EOSINOPHIL # BLD AUTO: 0.16 K/UL — SIGNIFICANT CHANGE UP (ref 0–0.7)
EOSINOPHIL NFR BLD AUTO: 2.1 % — SIGNIFICANT CHANGE UP (ref 0–8)
GLUCOSE SERPL-MCNC: 81 MG/DL — SIGNIFICANT CHANGE UP (ref 70–99)
HCT VFR BLD CALC: 40.8 % — SIGNIFICANT CHANGE UP (ref 37–47)
HGB BLD-MCNC: 12.4 G/DL — SIGNIFICANT CHANGE UP (ref 12–16)
IMM GRANULOCYTES NFR BLD AUTO: 0.3 % — SIGNIFICANT CHANGE UP (ref 0.1–0.3)
INR BLD: 1.04 RATIO — SIGNIFICANT CHANGE UP (ref 0.65–1.3)
LYMPHOCYTES # BLD AUTO: 2.13 K/UL — SIGNIFICANT CHANGE UP (ref 1.2–3.4)
LYMPHOCYTES # BLD AUTO: 27.6 % — SIGNIFICANT CHANGE UP (ref 20.5–51.1)
MCHC RBC-ENTMCNC: 26 PG — LOW (ref 27–31)
MCHC RBC-ENTMCNC: 30.4 G/DL — LOW (ref 32–37)
MCV RBC AUTO: 85.5 FL — SIGNIFICANT CHANGE UP (ref 81–99)
MONOCYTES # BLD AUTO: 0.7 K/UL — HIGH (ref 0.1–0.6)
MONOCYTES NFR BLD AUTO: 9.1 % — SIGNIFICANT CHANGE UP (ref 1.7–9.3)
NEUTROPHILS # BLD AUTO: 4.66 K/UL — SIGNIFICANT CHANGE UP (ref 1.4–6.5)
NEUTROPHILS NFR BLD AUTO: 60.3 % — SIGNIFICANT CHANGE UP (ref 42.2–75.2)
PLATELET # BLD AUTO: 243 K/UL — SIGNIFICANT CHANGE UP (ref 130–400)
POTASSIUM SERPL-MCNC: 5.3 MMOL/L — HIGH (ref 3.5–5)
POTASSIUM SERPL-SCNC: 5.3 MMOL/L — HIGH (ref 3.5–5)
PROT SERPL-MCNC: 7.6 G/DL — SIGNIFICANT CHANGE UP (ref 6–8)
PROTHROM AB SERPL-ACNC: 12 SEC — SIGNIFICANT CHANGE UP (ref 9.95–12.87)
RBC # BLD: 4.77 M/UL — SIGNIFICANT CHANGE UP (ref 4.2–5.4)
RBC # FLD: 16.2 % — HIGH (ref 11.5–14.5)
SODIUM SERPL-SCNC: 140 MMOL/L — SIGNIFICANT CHANGE UP (ref 135–146)
TROPONIN T SERPL-MCNC: <0.01 NG/ML — SIGNIFICANT CHANGE UP
TYPE + AB SCN PNL BLD: SIGNIFICANT CHANGE UP
WBC # BLD: 7.72 K/UL — SIGNIFICANT CHANGE UP (ref 4.8–10.8)
WBC # FLD AUTO: 7.72 K/UL — SIGNIFICANT CHANGE UP (ref 4.8–10.8)

## 2018-12-18 RX ORDER — DOCUSATE SODIUM 100 MG
100 CAPSULE ORAL THREE TIMES A DAY
Qty: 0 | Refills: 0 | Status: DISCONTINUED | OUTPATIENT
Start: 2018-12-18 | End: 2018-12-19

## 2018-12-18 RX ORDER — CHLORHEXIDINE GLUCONATE 213 G/1000ML
1 SOLUTION TOPICAL
Qty: 0 | Refills: 0 | Status: DISCONTINUED | OUTPATIENT
Start: 2018-12-18 | End: 2018-12-19

## 2018-12-18 RX ORDER — ASPIRIN/CALCIUM CARB/MAGNESIUM 324 MG
81 TABLET ORAL DAILY
Qty: 0 | Refills: 0 | Status: DISCONTINUED | OUTPATIENT
Start: 2018-12-18 | End: 2018-12-19

## 2018-12-18 RX ORDER — SENNA PLUS 8.6 MG/1
2 TABLET ORAL AT BEDTIME
Qty: 0 | Refills: 0 | Status: DISCONTINUED | OUTPATIENT
Start: 2018-12-18 | End: 2018-12-19

## 2018-12-18 RX ORDER — ATORVASTATIN CALCIUM 80 MG/1
80 TABLET, FILM COATED ORAL AT BEDTIME
Qty: 0 | Refills: 0 | Status: DISCONTINUED | OUTPATIENT
Start: 2018-12-18 | End: 2018-12-19

## 2018-12-18 RX ORDER — OXYCODONE HYDROCHLORIDE 5 MG/1
10 TABLET ORAL EVERY 6 HOURS
Qty: 0 | Refills: 0 | Status: DISCONTINUED | OUTPATIENT
Start: 2018-12-18 | End: 2018-12-19

## 2018-12-18 RX ORDER — DIAZEPAM 5 MG
10 TABLET ORAL DAILY
Qty: 0 | Refills: 0 | Status: DISCONTINUED | OUTPATIENT
Start: 2018-12-18 | End: 2018-12-19

## 2018-12-18 RX ORDER — FAMOTIDINE 10 MG/ML
20 INJECTION INTRAVENOUS
Qty: 0 | Refills: 0 | Status: DISCONTINUED | OUTPATIENT
Start: 2018-12-18 | End: 2018-12-19

## 2018-12-18 RX ORDER — DIAZEPAM 5 MG
1 TABLET ORAL
Qty: 0 | Refills: 0 | COMMUNITY

## 2018-12-18 RX ORDER — FLUOXETINE HCL 10 MG
40 CAPSULE ORAL DAILY
Qty: 0 | Refills: 0 | Status: DISCONTINUED | OUTPATIENT
Start: 2018-12-18 | End: 2018-12-19

## 2018-12-18 RX ORDER — ROPINIROLE 8 MG/1
1 TABLET, FILM COATED, EXTENDED RELEASE ORAL
Qty: 0 | Refills: 0 | COMMUNITY

## 2018-12-18 RX ORDER — ROPINIROLE 8 MG/1
0.25 TABLET, FILM COATED, EXTENDED RELEASE ORAL AT BEDTIME
Qty: 0 | Refills: 0 | Status: DISCONTINUED | OUTPATIENT
Start: 2018-12-18 | End: 2018-12-19

## 2018-12-18 RX ORDER — OXYCODONE HYDROCHLORIDE 5 MG/1
3 TABLET ORAL
Qty: 0 | Refills: 0 | COMMUNITY

## 2018-12-18 RX ORDER — ZOLPIDEM TARTRATE 10 MG/1
5 TABLET ORAL AT BEDTIME
Qty: 0 | Refills: 0 | Status: DISCONTINUED | OUTPATIENT
Start: 2018-12-18 | End: 2018-12-19

## 2018-12-18 RX ORDER — ENOXAPARIN SODIUM 100 MG/ML
40 INJECTION SUBCUTANEOUS AT BEDTIME
Qty: 0 | Refills: 0 | Status: DISCONTINUED | OUTPATIENT
Start: 2018-12-18 | End: 2018-12-19

## 2018-12-18 RX ADMIN — ATORVASTATIN CALCIUM 80 MILLIGRAM(S): 80 TABLET, FILM COATED ORAL at 23:29

## 2018-12-18 RX ADMIN — ROPINIROLE 0.25 MILLIGRAM(S): 8 TABLET, FILM COATED, EXTENDED RELEASE ORAL at 23:29

## 2018-12-18 RX ADMIN — ENOXAPARIN SODIUM 40 MILLIGRAM(S): 100 INJECTION SUBCUTANEOUS at 23:30

## 2018-12-18 RX ADMIN — Medication 150 MILLIGRAM(S): at 21:23

## 2018-12-18 RX ADMIN — Medication 10 MILLIGRAM(S): at 23:34

## 2018-12-18 RX ADMIN — Medication 100 MILLIGRAM(S): at 23:31

## 2018-12-18 NOTE — ED PROVIDER NOTE - NS ED ROS FT
Constitutional: No fever, chills, nt sweats.   Eyes:  No visual changes, eye pain or discharge.  ENMT:  No hearing changes, pain, no sore throat or runny nose, no difficulty swallowing  Cardiac:  No chest pain, SOB or edema. No chest pain with exertion.  Respiratory:  No cough or respiratory distress. No hemoptysis. No history of asthma or RAD.  GI:  No nausea, vomiting, diarrhea or abdominal pain.  :  No dysuria, frequency or burning.  MS:  No myalgia, muscle weakness, joint pain or back pain.  Neuro:  B/l arm and leg weakness. No numbness/parasthesias. No facial droop, dysphonia, aphasia.   Skin:  No skin rash.   Endocrine: No history of thyroid disease or diabetes.

## 2018-12-18 NOTE — ED PROVIDER NOTE - PROGRESS NOTE DETAILS
I received signout from Dr. Hackett. Pending Neurology decision and labs. Admit to stroke unit. CTH negative.

## 2018-12-18 NOTE — ED PROVIDER NOTE - MEDICAL DECISION MAKING DETAILS
Patient to be admitted to the stroke unit. Case discussed with and care endorsed to medical admitting resident. Admitting physician notified.

## 2018-12-18 NOTE — H&P ADULT - NSHPPHYSICALEXAM_GEN_ALL_CORE
Vital Signs Last 24 Hrs  T(C): 35.6 (18 Dec 2018 19:26), Max: 36.2 (18 Dec 2018 15:17)  T(F): 96.1 (18 Dec 2018 19:26), Max: 97.2 (18 Dec 2018 15:17)  HR: 85 (18 Dec 2018 20:00) (53 - 85)  BP: 101/72 (18 Dec 2018 20:00) (97/54 - 116/58)  BP(mean): --  RR: 18 (18 Dec 2018 19:26) (16 - 18)  SpO2: 100% (18 Dec 2018 19:26) (98% - 100%)    PHYSICAL EXAM:      Constitutional:    Eyes:    ENMT:    Neck:    Breasts:    Back:    Respiratory:    Cardiovascular:    Gastrointestinal:    Extremities:    Neurological: Vital Signs Last 24 Hrs  T(C): 35.6 (18 Dec 2018 19:26), Max: 36.2 (18 Dec 2018 15:17)  T(F): 96.1 (18 Dec 2018 19:26), Max: 97.2 (18 Dec 2018 15:17)  HR: 85 (18 Dec 2018 20:00) (53 - 85)  BP: 101/72 (18 Dec 2018 20:00) (97/54 - 116/58)  BP(mean): --  RR: 18 (18 Dec 2018 19:26) (16 - 18)  SpO2: 100% (18 Dec 2018 19:26) (98% - 100%)    PHYSICAL EXAM;  Constitutional: no acute distress sitting comfortably in bed    Eyes: no pallor or icterus     ENMT: within normal range    Neck: no JVD    Back: no CVA tenderness    Respiratory: VB +0    Cardiovascular: S1 +S2 +0    Gastrointestinal: soft ,non tender ,BS +,no HS megaly    Extremities: no LE weakness    Neurological: AAO*3  non focal  CN 2-12 intact  power 5/5 in both upper and lower ext   sensation intact  gait nl Vital Signs Last 24 Hrs  T(C): 35.6 (18 Dec 2018 19:26), Max: 36.2 (18 Dec 2018 15:17)  T(F): 96.1 (18 Dec 2018 19:26), Max: 97.2 (18 Dec 2018 15:17)  HR: 85 (18 Dec 2018 20:00) (53 - 85)  BP: 101/72 (18 Dec 2018 20:00) (97/54 - 116/58)  BP(mean): --  RR: 18 (18 Dec 2018 19:26) (16 - 18)  SpO2: 100% (18 Dec 2018 19:26) (98% - 100%)    PHYSICAL EXAM;  Constitutional: no acute distress sitting comfortably in bed    Eyes: no pallor or icterus     ENMT: within normal range    Neck: no JVD    Back: no CVA tenderness    Respiratory: CTA b/l no wheezes or rhonchi    Cardiovascular: S1 +S2 no murmurs rubs or gallops    Gastrointestinal: soft ,non tender, no guarding, ,BS +,no HS megaly    Extremities: no LE weakness    Neurological: AAO*3  non focal  CN 2-12 intact  power 5/5 in both upper and lower ext   sensation intact  gait nl

## 2018-12-18 NOTE — H&P ADULT - NSHPLABSRESULTS_GEN_ALL_CORE
12.4   7.72  )-----------( 243      ( 18 Dec 2018 15:35 )             40.8   12-18    140  |  104  |  14  ----------------------------<  81  5.3<H>   |  19  |  1.2    Ca    8.6      18 Dec 2018 15:35    TPro  7.6  /  Alb  4.0  /  TBili  0.3  /  DBili  x   /  AST  21  /  ALT  11  /  AlkPhos  125<H>  12-18  PT/INR - ( 18 Dec 2018 15:35 )   PT: 12.00 sec;   INR: 1.04 ratio         PTT - ( 18 Dec 2018 15:35 )  PTT:29.7 sec  CARDIAC MARKERS ( 18 Dec 2018 15:35 )  x     / <0.01 ng/mL / x     / x     / x        LIVER FUNCTIONS - ( 18 Dec 2018 15:35 )  Alb: 4.0 g/dL / Pro: 7.6 g/dL / ALK PHOS: 125 U/L / ALT: 11 U/L / AST: 21 U/L / GGT: x         < from: CT Head No Cont (12.18.18 @ 16:03) >    IMPRESSION:     No acute intracranial hemorrhage or large territorial infarct.    In view of the patient's stated clinical history of possible   cerebrovascular accident, follow-up examination is recommended, if   clinically warranted.    < end of copied text >

## 2018-12-18 NOTE — ED PROVIDER NOTE - PHYSICAL EXAMINATION
Constitutional: Uncomfortable, tearful.   Head: Atraumatic.  Eyes: PERRLA. EOMI without discomfort.   ENT: No nasal discharge. Mucous membranes moist.  Neck: Supple. Painless ROM.  Cardiovascular: Regular rhythm. Regular rate. Normal S1 and S2. No murmurs. 2+ pulses in all extremities.   Pulmonary: Normal respiratory rate and effort. Lungs clear to auscultation bilaterally. No wheezing, rales, or rhonchi. Bilateral, equal lung expansion.   Abdominal: Soft. Nondistended. Nontender. No rebound or guarding.   Extremities. Pelvis stable. No lower extremity edema. Symmetric calves.  Skin: No rashes.   Neuro: AAOx3. 1+ patellar reflexes. CN2-12 intact. Sensation intact b/l. 4/5 strength in all extremities. No dysmetria, dysdiadochokinesia.   Psych: Normal mood. Normal affect.

## 2018-12-18 NOTE — ED PROVIDER NOTE - OBJECTIVE STATEMENT
54 y/o female h/o anxiety, chronic pain p/w weakness. Started today when she woke up. Pt can't lift her arms up, is having difficulty ambulating. Denies facial droop, dysphagia, dysphonia, aphasia, numbness/parasthesias, HA, CP, SOB, abdominal pain.

## 2018-12-18 NOTE — H&P ADULT - ASSESSMENT
54 yo F with PMH of Lyme disease, fibromyalgia, chronic back pain, restless leg syndrome, polysubstance abuse presented to ED with sudden onset upper and lower extremities weakness with difficulty speaking since 9 am ,when she woke up completely resolved by 4;30 pm.    #expressive aphasia and both upper and lower ext weakness;  stroke coded ,negative CT head   oni 2/2 substance abuse vs TIA  cw aspirin 81 and lipitior 80 mg  Neuro eval with Dr Hawkins  stroke work up  if recommended by neurology  cw tele monitoring and neuro exam for now   send urine and serum for drug screen   psych evaluation     #Fibromyalgia/ch pain /restless leg syndrome;  cw oxycodone ,diazepam ,ropinirole and fluoxetine home dose    #DVT ppx ; with Lovenox   #GI ppx ; with Pepcid    regular diet ; no dysphagia   no need for speech and swallow eval   or PT/OT walking now)    FULL CODE     #Dispo; after psych and neuro eval 54 yo F with PMH of Lyme disease, fibromyalgia, chronic back pain, restless leg syndrome, polysubstance abuse presented to ED with sudden onset upper and lower extremities weakness with difficulty speaking since 9 am ,when she woke up, completely reslved approx 6 hours later    #expressive aphasia+ both upper and lower ext weakness;  stroke code called  ,negative CT head   oni 2/2 substance abuse vs TIA, paer patient she's had 3 TIAs in the past   UDS - + BZD and barbiturates  symptoms resolved 6 hours after onset  cw aspirin 81 and lipitor 80 mg  Neuro eval with Dr Hawkins pending  cw tele monitoring  psych evaluation     #Fibromyalgia/chronic pain /restless leg syndrome;  cw oxycodone ,diazepam ,ropinirole and fluoxetine home dose    #DVT ppx ; with Lovenox   #GI ppx ; with Pepcid    regular diet ; no dysphagia   no need for speech and swallow eval   or PT/OT walking now)    FULL CODE     #Dispo: fully functional at home

## 2018-12-18 NOTE — STROKE CODE NOTE - ABSOLUTE EXCLUSION OTHER CRITERIA
Outside the therapeutic window for IV t-PA, inconsistent neuro exam, +pettit sign. No acute stroke intervention.

## 2018-12-18 NOTE — H&P ADULT - HISTORY OF PRESENT ILLNESS
54 yo F with PMH of Lyme disease, fibromyalgia, chronic back pain, restless leg syndrome, polysubstance use presented to ED with 52 yo F with PMH of Lyme disease, fibromyalgia, chronic back pain, restless leg syndrome, polysubstance abuse presented to ED with sudden onset upper and lower extremities weakness with difficulty speaking since 9 am ,when she woke up.  As per pt ,who was back to baseline at the time of admission, she woke up the morning  and was not able to move ,and speak ,was taken to ER ,where she was stroke coded ,CT head was negative ,NIHSS 8 ,and no TPA was given coz she was out of window and inconsistent neuro exam .She did not receive anything in ER ,at the time of admission ,she recovered completely by 4;30 pm ,was talking ,eating ,walking and drinking at the of admission.  pt denies any drug or alcohol use a night prior and totally refused the knowledge of a postive drug  screen in last admission in aug/sept,2018 ,when she was positive for multiple drugs including amphetamine ,and was admitted for pneumonia. she also stated that she follows Dr Hawkins for mini stroke ?  no fever headache ,chest pain ,nausea or vomiting reported.    In ER ,at the time of admission she was vitally stable with normal neurological exam   spoke to psych ,will see the pt in am 54 yo F with PMH of Lyme disease, fibromyalgia, chronic back pain, restless leg syndrome, polysubstance abuse presented to ED with sudden onset upper and lower extremities weakness with difficulty speaking since 9 am ,when she woke up.  As per pt ,who was back to baseline at the time of admission, she woke up the morning  and was not able to move ,and speak ,was taken to ER ,where she was stroke coded ,CT head was negative ,NIHSS 8 ,and no TPA was given coz she was out of window and inconsistent neuro exam .She did not receive anything in ER ,at the time of admission ,she recovered completely by 4;30 pm ,was talking ,eating ,walking and drinking at the of admission.  pt denies any drug or alcohol use a night prior and totally refused the knowledge of a postive drug  screen in last admission in aug/sept,2018 ,when she was positive for multiple drugs including amphetamine ,and was admitted for pneumonia. she also stated that she follows Dr Hawkins as she has had 3 mini strokes in the last 5 years??  no fever headache ,chest pain ,nausea or vomiting reported.    Upon my examination, all symptoms resolved, patient eating breakfast

## 2018-12-18 NOTE — ED ADULT NURSE NOTE - OBJECTIVE STATEMENT
Pt 52 y/o female c/o of weakness cant move arms, pt states she last felt "normal" around 915 am. Pt states she just felt weird. Pt was able to follow commands and was A&Ox3.

## 2018-12-18 NOTE — H&P ADULT - NSHPSOCIALHISTORY_GEN_ALL_CORE
active smoker   hx of drug abuse   +drug screen in last admission in aug/sept 208 active smoker (30+ yr )  hx of drug abuse (though she denies )   +drug screen in last admission in aug/sept 208  admits to drink socially   lives at home with family active smoker (30+ yr ), now 1 pack/ Q3 days  hx of drug abuse (though she denies )   +drug screen in last admission in aug/sept 208  admits to drink socially   lives at home with family

## 2018-12-19 ENCOUNTER — TRANSCRIPTION ENCOUNTER (OUTPATIENT)
Age: 53
End: 2018-12-19

## 2018-12-19 VITALS
SYSTOLIC BLOOD PRESSURE: 136 MMHG | TEMPERATURE: 98 F | RESPIRATION RATE: 18 BRPM | OXYGEN SATURATION: 99 % | HEART RATE: 106 BPM | DIASTOLIC BLOOD PRESSURE: 66 MMHG

## 2018-12-19 DIAGNOSIS — F33.42 MAJOR DEPRESSIVE DISORDER, RECURRENT, IN FULL REMISSION: ICD-10-CM

## 2018-12-19 DIAGNOSIS — F13.20 SEDATIVE, HYPNOTIC OR ANXIOLYTIC DEPENDENCE, UNCOMPLICATED: ICD-10-CM

## 2018-12-19 LAB
ALBUMIN SERPL ELPH-MCNC: 3.5 G/DL — SIGNIFICANT CHANGE UP (ref 3.5–5.2)
ALP SERPL-CCNC: 120 U/L — HIGH (ref 30–115)
ALT FLD-CCNC: 8 U/L — SIGNIFICANT CHANGE UP (ref 0–41)
ANION GAP SERPL CALC-SCNC: 15 MMOL/L — HIGH (ref 7–14)
APPEARANCE UR: ABNORMAL
AST SERPL-CCNC: 11 U/L — SIGNIFICANT CHANGE UP (ref 0–41)
BASOPHILS # BLD AUTO: 0.08 K/UL — SIGNIFICANT CHANGE UP (ref 0–0.2)
BASOPHILS NFR BLD AUTO: 0.7 % — SIGNIFICANT CHANGE UP (ref 0–1)
BILIRUB SERPL-MCNC: <0.2 MG/DL — SIGNIFICANT CHANGE UP (ref 0.2–1.2)
BILIRUB UR-MCNC: NEGATIVE — SIGNIFICANT CHANGE UP
BUN SERPL-MCNC: 14 MG/DL — SIGNIFICANT CHANGE UP (ref 10–20)
CALCIUM SERPL-MCNC: 8.3 MG/DL — LOW (ref 8.5–10.1)
CHLORIDE SERPL-SCNC: 105 MMOL/L — SIGNIFICANT CHANGE UP (ref 98–110)
CHOLEST SERPL-MCNC: 146 MG/DL — SIGNIFICANT CHANGE UP (ref 100–200)
CK MB CFR SERPL CALC: 1.1 NG/ML — SIGNIFICANT CHANGE UP (ref 0.6–6.3)
CK SERPL-CCNC: 36 U/L — SIGNIFICANT CHANGE UP (ref 0–225)
CO2 SERPL-SCNC: 20 MMOL/L — SIGNIFICANT CHANGE UP (ref 17–32)
COLOR SPEC: YELLOW — SIGNIFICANT CHANGE UP
CREAT SERPL-MCNC: 0.9 MG/DL — SIGNIFICANT CHANGE UP (ref 0.7–1.5)
DIFF PNL FLD: NEGATIVE — SIGNIFICANT CHANGE UP
DRUG SCREEN 1, URINE RESULT: SIGNIFICANT CHANGE UP
EOSINOPHIL # BLD AUTO: 0.37 K/UL — SIGNIFICANT CHANGE UP (ref 0–0.7)
EOSINOPHIL NFR BLD AUTO: 3.1 % — SIGNIFICANT CHANGE UP (ref 0–8)
ESTIMATED AVERAGE GLUCOSE: 100 MG/DL — SIGNIFICANT CHANGE UP (ref 68–114)
GLUCOSE SERPL-MCNC: 113 MG/DL — HIGH (ref 70–99)
GLUCOSE UR QL: NEGATIVE MG/DL — SIGNIFICANT CHANGE UP
HBA1C BLD-MCNC: 5.1 % — SIGNIFICANT CHANGE UP (ref 4–5.6)
HCT VFR BLD CALC: 36.6 % — LOW (ref 37–47)
HDLC SERPL-MCNC: 65 MG/DL — SIGNIFICANT CHANGE UP
HGB BLD-MCNC: 11.6 G/DL — LOW (ref 12–16)
IMM GRANULOCYTES NFR BLD AUTO: 0.3 % — SIGNIFICANT CHANGE UP (ref 0.1–0.3)
KETONES UR-MCNC: NEGATIVE — SIGNIFICANT CHANGE UP
LEUKOCYTE ESTERASE UR-ACNC: ABNORMAL
LIPID PNL WITH DIRECT LDL SERPL: 65 MG/DL — SIGNIFICANT CHANGE UP (ref 4–129)
LYMPHOCYTES # BLD AUTO: 26.6 % — SIGNIFICANT CHANGE UP (ref 20.5–51.1)
LYMPHOCYTES # BLD AUTO: 3.21 K/UL — SIGNIFICANT CHANGE UP (ref 1.2–3.4)
MAGNESIUM SERPL-MCNC: 1.7 MG/DL — LOW (ref 1.8–2.4)
MCHC RBC-ENTMCNC: 27 PG — SIGNIFICANT CHANGE UP (ref 27–31)
MCHC RBC-ENTMCNC: 31.7 G/DL — LOW (ref 32–37)
MCV RBC AUTO: 85.3 FL — SIGNIFICANT CHANGE UP (ref 81–99)
MONOCYTES # BLD AUTO: 1.13 K/UL — HIGH (ref 0.1–0.6)
MONOCYTES NFR BLD AUTO: 9.4 % — HIGH (ref 1.7–9.3)
NEUTROPHILS # BLD AUTO: 7.25 K/UL — HIGH (ref 1.4–6.5)
NEUTROPHILS NFR BLD AUTO: 59.9 % — SIGNIFICANT CHANGE UP (ref 42.2–75.2)
NITRITE UR-MCNC: NEGATIVE — SIGNIFICANT CHANGE UP
PH UR: 6 — SIGNIFICANT CHANGE UP (ref 5–8)
PLATELET # BLD AUTO: 241 K/UL — SIGNIFICANT CHANGE UP (ref 130–400)
POTASSIUM SERPL-MCNC: 4.3 MMOL/L — SIGNIFICANT CHANGE UP (ref 3.5–5)
POTASSIUM SERPL-SCNC: 4.3 MMOL/L — SIGNIFICANT CHANGE UP (ref 3.5–5)
PROT SERPL-MCNC: 6.6 G/DL — SIGNIFICANT CHANGE UP (ref 6–8)
PROT UR-MCNC: NEGATIVE MG/DL — SIGNIFICANT CHANGE UP
RBC # BLD: 4.29 M/UL — SIGNIFICANT CHANGE UP (ref 4.2–5.4)
RBC # FLD: 16.1 % — HIGH (ref 11.5–14.5)
SODIUM SERPL-SCNC: 140 MMOL/L — SIGNIFICANT CHANGE UP (ref 135–146)
SP GR SPEC: 1.01 — SIGNIFICANT CHANGE UP (ref 1.01–1.03)
TOTAL CHOLESTEROL/HDL RATIO MEASUREMENT: 2.2 RATIO — LOW (ref 4–5.5)
TRIGL SERPL-MCNC: 116 MG/DL — SIGNIFICANT CHANGE UP (ref 10–149)
TROPONIN T SERPL-MCNC: <0.01 NG/ML — SIGNIFICANT CHANGE UP
UROBILINOGEN FLD QL: 0.2 MG/DL — SIGNIFICANT CHANGE UP (ref 0.2–0.2)
WBC # BLD: 12.08 K/UL — HIGH (ref 4.8–10.8)
WBC # FLD AUTO: 12.08 K/UL — HIGH (ref 4.8–10.8)

## 2018-12-19 RX ORDER — ROPINIROLE 8 MG/1
1 TABLET, FILM COATED, EXTENDED RELEASE ORAL
Qty: 0 | Refills: 0 | DISCHARGE
Start: 2018-12-19

## 2018-12-19 RX ORDER — ROPINIROLE 8 MG/1
1 TABLET, FILM COATED, EXTENDED RELEASE ORAL
Qty: 0 | Refills: 0 | COMMUNITY

## 2018-12-19 RX ORDER — MAGNESIUM SULFATE 500 MG/ML
2 VIAL (ML) INJECTION ONCE
Qty: 0 | Refills: 0 | Status: COMPLETED | OUTPATIENT
Start: 2018-12-19 | End: 2018-12-19

## 2018-12-19 RX ADMIN — Medication 100 MILLIGRAM(S): at 05:16

## 2018-12-19 RX ADMIN — FAMOTIDINE 20 MILLIGRAM(S): 10 INJECTION INTRAVENOUS at 05:25

## 2018-12-19 RX ADMIN — OXYCODONE HYDROCHLORIDE 10 MILLIGRAM(S): 5 TABLET ORAL at 08:18

## 2018-12-19 RX ADMIN — Medication 40 MILLIGRAM(S): at 12:15

## 2018-12-19 RX ADMIN — Medication 150 MILLIGRAM(S): at 05:16

## 2018-12-19 RX ADMIN — OXYCODONE HYDROCHLORIDE 10 MILLIGRAM(S): 5 TABLET ORAL at 03:46

## 2018-12-19 RX ADMIN — Medication 50 GRAM(S): at 10:42

## 2018-12-19 RX ADMIN — Medication 100 MILLIGRAM(S): at 13:20

## 2018-12-19 RX ADMIN — Medication 10 MILLIGRAM(S): at 10:42

## 2018-12-19 RX ADMIN — OXYCODONE HYDROCHLORIDE 10 MILLIGRAM(S): 5 TABLET ORAL at 00:50

## 2018-12-19 NOTE — PROGRESS NOTE ADULT - SUBJECTIVE AND OBJECTIVE BOX
SUBJECTIVE:    Patient is a 53y old Female who presents with a chief complaint of acute onset b/l arm and leg weakness with expressive asphasia since 9 am (18 Dec 2018 20:32)    Currently admitted to medicine with the primary diagnosis of Weakness     Today is hospital day 1d. This morning she is resting comfortably in bed and reports no new issues or overnight events. Pt endorses complete resolution of symptoms and complaints    PAST MEDICAL & SURGICAL HISTORY  Restless leg syndrome  Polysubstance dependence  Back pain, chronic  Lyme disease  Fibromyalgia  History of gastric surgery  Fracture, radius  History of  section  History of appendectomy    SOCIAL HISTORY:  Negative for smoking/alcohol/drug use.     ALLERGIES:  No Known Allergies    MEDICATIONS:  STANDING MEDICATIONS  aspirin  chewable 81 milliGRAM(s) Oral daily  atorvastatin 80 milliGRAM(s) Oral at bedtime  chlorhexidine 4% Liquid 1 Application(s) Topical <User Schedule>  docusate sodium 100 milliGRAM(s) Oral three times a day  enoxaparin Injectable 40 milliGRAM(s) SubCutaneous at bedtime  famotidine    Tablet 20 milliGRAM(s) Oral two times a day  FLUoxetine 40 milliGRAM(s) Oral daily  pregabalin 150 milliGRAM(s) Oral two times a day  rOPINIRole 0.25 milliGRAM(s) Oral at bedtime    PRN MEDICATIONS  diazepam    Tablet 10 milliGRAM(s) Oral daily PRN  oxyCODONE    IR 10 milliGRAM(s) Oral every 6 hours PRN  senna 2 Tablet(s) Oral at bedtime PRN  zolpidem 5 milliGRAM(s) Oral at bedtime PRN  zolpidem 5 milliGRAM(s) Oral at bedtime PRN    VITALS:   T(F): 98  HR: 86  BP: 121/71  RR: 16  SpO2: 98%    CAPILLARY BLOOD GLUCOSE  91 (18 Dec 2018 16:17)      LABS:                        11.6   12.08 )-----------( 241      ( 19 Dec 2018 07:44 )             36.6     -    140  |  105  |  14  ----------------------------<  113<H>  4.3   |  20  |  0.9    Ca    8.3<L>      19 Dec 2018 07:44  Mg     1.7     -    TPro  6.6  /  Alb  3.5  /  TBili  <0.2  /  DBili  x   /  AST  11  /  ALT  8   /  AlkPhos  120<H>  -    PT/INR - ( 18 Dec 2018 15:35 )   PT: 12.00 sec;   INR: 1.04 ratio         PTT - ( 18 Dec 2018 15:35 )  PTT:29.7 sec  Urinalysis Basic - ( 19 Dec 2018 03:50 )    Color: Yellow / Appearance: Cloudy / S.010 / pH: x  Gluc: x / Ketone: Negative  / Bili: Negative / Urobili: 0.2 mg/dL   Blood: x / Protein: Negative mg/dL / Nitrite: Negative   Leuk Esterase: Trace / RBC: x / WBC 1-2 /HPF   Sq Epi: x / Non Sq Epi: Many /HPF / Bacteria: Few /HPF        Creatine Kinase, Serum: 36 U/L (18 @ 07:44)  Troponin T, Serum: <0.01 ng/mL (18 @ 07:44)  Troponin T, Serum: <0.01 ng/mL (18 @ 15:35)      CARDIAC MARKERS ( 19 Dec 2018 07:44 )  x     / <0.01 ng/mL / 36 U/L / x     / 1.1 ng/mL  CARDIAC MARKERS ( 18 Dec 2018 15:35 )  x     / <0.01 ng/mL / x     / x     / x          RADIOLOGY:    PHYSICAL EXAM:  GEN: No acute distress  LUNGS: Clear to auscultation bilaterally   HEART: S1/S2 present. RRR.   ABD: Soft, non-tender, non-distended. Bowel sounds present  EXT: NC/NC/NE/2+PP/JAMES  NEURO: AAOX3. No focal deficits

## 2018-12-19 NOTE — DISCHARGE NOTE ADULT - CARE PLAN
Principal Discharge DX:	Expressive aphasia  Goal:	resolution of symptoms  Assessment and plan of treatment:	you were advised to wait until neurology has seen you and cleared you to rule out a stroke. Despite our education you chose to leave ama. Please follow up with your PCP in 1 week

## 2018-12-19 NOTE — DISCHARGE NOTE ADULT - PATIENT PORTAL LINK FT
You can access the [facility], offered by [facility], by registering with the following website: [email or web address]

## 2018-12-19 NOTE — DISCHARGE NOTE ADULT - PLAN OF CARE
resolution of symptoms you were advised to wait until neurology has seen you and cleared you to rule out a stroke. Despite our education you chose to leave ama. Please follow up with your PCP in 1 week

## 2018-12-19 NOTE — BEHAVIORAL HEALTH ASSESSMENT NOTE - KNOWN PSYCHIATRIC ADMISSION WITHIN THE PAST 30 DAYS
RX PROGRESS NOTE: Vancomycin Therapeutic Drug Monitoring    Day of therapy: 4    Indication and target trough: Septic shock with unclear source. Target trough: 15-20 mcg/mL    Current vancomycin dosing regimen: 1250 mg IVPB x 1 dose    Most recent height and weight information:  Weight: 64.9 kg (11/01/17 0500)  Height: 5' 7\" (170.2 cm) (11/01/17 1540)    The Following are the Calculated  Current Weights for Dougie Torres     Adjusted Ideal        62.9 kg 61.6 kg            Labs:  Serum Creatinine and Creatinine Clearance:  Serum creatinine: 5.1 mg/dL High 11/01/17 0455  Estimated creatinine clearance: 9 mL/min    Vancomycin Serum Concentrations:  VANCOMYCIN, RANDOM (mcg/mL)   Date/Time Value   11/01/2017 0455 21.5       Assessment:  Serum concentration of 21.5 after the last dose.   Based on the serum concentration, will hold vancomycin dose and obtain random vancomycin level at 2100 tonight.    Additional serum concentrations may be necessary depending on pathogen identified, risk factors for adverse events, and/or duration of therapy.  Pharmacy will continue to follow and adjust as needed.    Thank you,    Stef Christina [facility]  11/1/2017 4:38 PM  Contact # [phone]      No

## 2018-12-19 NOTE — BEHAVIORAL HEALTH ASSESSMENT NOTE - NSBHREFERDETAILS_PSY_A_CORE_FT
patient with a history of positive utox and suspected substance use presenting with symptoms/signs that were ruled out as CVA or MI.

## 2018-12-19 NOTE — BEHAVIORAL HEALTH ASSESSMENT NOTE - SUMMARY
53 year old woman, , domiciled, homemaker with a past psychiatric history notable for MDD and anxiety NEC as well as possible substance abuse according to past utox data and report of family who presented to the ED after a period of shaking followed by upper and lower extremity weakness and expressive aphasia which spontaneously resolved on 12/18/18.  Her prior history is notable for diagnoses of depression and anxiety that have been well controlled in the outpatient setting on SSRI and benzodiazepine medication without need for prior hospitalization and no prior suicide attempts.  Her history is also notable for potential evidence of past substance abuse, which she may be minimizing with her current denial of use.  Her HPI and her MSE are not consistent with acute psychiatric symptoms or signs that would explain her recent period of distress.  One potential explanation would be orthostatic hypotension followed by a period of anxiety, but that is not clear and currently she is not reporting anxiety.  Her presenting syndrome is not clearly consistent with substance intoxication and the patient is refusing treatment for substance abuse at this time.  There is no indication for psychiatric intervention. 53 year old woman, , domiciled, homemaker with a past psychiatric history notable for MDD and anxiety NEC as well as possible substance abuse according to past utox data and report of family who presented to the ED after a period of shaking followed by upper and lower extremity weakness and expressive aphasia which spontaneously resolved on 12/18/18.  Her prior history is notable for diagnoses of depression and anxiety that have been well controlled in the outpatient setting on SSRI and benzodiazepine medication without need for prior hospitalization and no prior suicide attempts.  Her history is also notable for potential evidence of past substance abuse, which she may be minimizing with her current denial of use.  This is especially likely given her current utox being positive for barbiturates.  Her HPI and her MSE are not consistent with acute psychiatric symptoms or signs that would explain her recent period of distress.  Her presenting syndrome could be consistent with substance intoxication, in particular barbiturates in combination with opioids and benzodiazepines.  However, the patient is minimizing use and is refusing treatment for substance abuse at this time.  There is no indication for involuntary psychiatric intervention.  Psychoeducation provided regarding the risks of combined use of barbiturates, benzodiazepines, and opioids especially due to pharmacodynamic synergy for respiratory depression.

## 2018-12-19 NOTE — DISCHARGE NOTE ADULT - CARE PROVIDER_API CALL
Bony Livingston), Family Medicine  [address]  [place] [address]  Phone: [phone]  Fax: [phone]    Jerod Hawkins), Neurology  [address] [place] [id number]  Phone: [phone]  Fax: [phone]

## 2018-12-19 NOTE — BEHAVIORAL HEALTH ASSESSMENT NOTE - DESCRIPTION (FIRST USE, LAST USE, QUANTITY, FREQUENCY, DURATION)
10pack years but has reduced the habit to 1 pack/week and plans on quitting without additional support. social alcohol use, reporting one to two drinks on rare occasions occasional marijuana use, denies current use Unclear history, patient denies but prior utox positive for amphetamines.  Patient's family has suspected past use

## 2018-12-19 NOTE — DISCHARGE NOTE ADULT - MEDICATION SUMMARY - MEDICATIONS TO TAKE
I will START or STAY ON the medications listed below when I get home from the hospital:    aspirin 81 mg oral tablet  -- 1 tab(s) by mouth once a day  -- Indication: For cva    oxyCODONE 10 mg oral tablet  -- 1 tab(s) by mouth every 6 hours, As Needed  -- Indication: For pain    diazePAM 10 mg oral tablet  -- 1 tab(s) by mouth prn, As Needed  -- Indication: For anxiety    Lyrica 150 mg oral capsule  -- 1 cap(s) by mouth 2 times a day  -- Indication: For anxiety    FLUoxetine 40 mg oral capsule  -- 1 cap(s) by mouth once a day  -- Indication: For depression    rOPINIRole 0.25 mg oral tablet  -- 1 tab(s) by mouth once a day (at bedtime)  -- Indication: For Restless leg    Ambien 10 mg oral tablet  -- 1 tab(s) by mouth once a day (at bedtime)  -- Indication: For imsomnia    Zantac 150 oral tablet  -- 1 tab(s) by mouth 2 times a day  -- Indication: For gerd    docusate sodium 100 mg oral capsule  -- 1 cap(s) by mouth 3 times a day  -- Indication: For constipation    senna oral tablet  -- 2 tab(s) by mouth once a day (at bedtime), As needed, Constipation  -- Indication: For constipation

## 2018-12-19 NOTE — BEHAVIORAL HEALTH ASSESSMENT NOTE - HPI (INCLUDE ILLNESS QUALITY, SEVERITY, DURATION, TIMING, CONTEXT, MODIFYING FACTORS, ASSOCIATED SIGNS AND SYMPTOMS)
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  In the ED, a stroke code was called.  Non-con CT head showed no acute intracranial bleed, CKMB/troponins/EKG were negative for acute MI.  Psychiatry consult was called for potential psychiatric or substance use having contributed to the patient’s reported symptoms.  Upon assessment, the patient reported resolution of her presenting symptoms.  She reported that she had no history of substance abuse.  Chart review does reveal a history of positive utoxes and family concern that she may be using other people’s prescription drugs.  However, the patient continued to refuse misuse of substnaces when presented with this information.  She stated that she is treated with benzodiazepines and opioids prescribed to her by medical professionals for treatment of her diagnosed conditions.  She denied misuse of these medications.  She stated that her depression and anxiety are well controlled on fluoxetine and diazepam.  The patient denied any active or passive suicidality or self-harm thoughts or behavior. The patient denied any history of panic attacks. The patient denied current symptoms of anxiety, depression, psychosis.    FAMILY HISTORY: No known psychiatric or substance history in primary relatives 53 year old woman, , domiciled, homemaker with a past psychiatric history notable for MDD and anxiety NEC who presented to the ED after a period of shaking followed by upper and lower extremity weakness and expressive aphasia which spontaneously resolved on 12/18/18.  In the ED, a stroke code was called.  Non-con CT head showed no acute intracranial bleed, CKMB/troponins/EKG were negative for acute MI.  Psychiatry consult was called for potential psychiatric or substance use having contributed to the patient’s reported symptoms.  Upon assessment, the patient reported resolution of her presenting symptoms.  She reported that she had no history of substance abuse.  Chart review does reveal a history of positive utoxes and family concern that she may be using other people’s prescription drugs.  However, the patient continued to refuse misuse of substances when presented with this information.  She stated that she is treated with benzodiazepines and opioids prescribed to her by medical professionals for treatment of her diagnosed conditions.  She denied misuse of these medications.  It is notable that her utox for this current presentation is positive for barbiturates, which she also denies use of.  She stated that her depression and anxiety are well controlled on fluoxetine and diazepam.  The patient denied any active or passive suicidality or self-harm thoughts or behavior. The patient denied any history of panic attacks. The patient denied current symptoms of anxiety, depression, psychosis.    FAMILY HISTORY: No known psychiatric or substance history in primary relatives

## 2018-12-19 NOTE — BEHAVIORAL HEALTH ASSESSMENT NOTE - NSBHMEDSOTHERFT_PSY_A_CORE
ASA 81mg 1 tab qDaily; Diazepam 10mg q8h prn, Zantac 150mg oral q12h, Oxycodone 10mg oral q6h, Ropinrole .25mg oral qDaily, Ambien 10mg 1tab qDaily/bedtime, Lyrica 150mg q12h, Fluoxetine 40mg qDaily

## 2018-12-19 NOTE — PROGRESS NOTE ADULT - ASSESSMENT
52 yo F with PMH of Lyme disease, fibromyalgia, chronic back pain, restless leg syndrome, polysubstance abuse presented to ED with sudden onset upper and lower extremities weakness with difficulty speaking since 9 am ,when she woke up completely resolved by 4;30 pm.    #Expressive aphasia and both upper and lower ext weakness; - r/o TIA vs substance abuse  - CT Head negative  - cw aspirin 81 and lipitior 80 mg  - f/u Neuro w/ Dr. Bowers  - c/w tele and neuro check  - f/u utox  - f/u psych eval    #Hypomagensemia  - Mg repleted  - f/u repeat Mg    #Fibromyalgia/ch pain /restless leg syndrome;  cw oxycodone ,diazepam ,ropinirole and fluoxetine home dose    #DVT ppx ; with Lovenox   #GI ppx ; with Pepcid    regular diet ; no dysphagia   no need for speech and swallow eval   or PT/OT walking now)    FULL CODE     #Dispo; after psych and neuro eval

## 2018-12-19 NOTE — BEHAVIORAL HEALTH ASSESSMENT NOTE - RISK ASSESSMENT
Patient has elevated chronic risk for harm to self and others due to primary mood and anxiety diagnoses as well as possible substance abuse, however this is mitigated by a lack of history of intentional self harm or harm to others.  Her imminent risk appears to be low with no active signs of psychiatric exacerbation or substance intoxication,.
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
## 2018-12-19 NOTE — DISCHARGE NOTE ADULT - HOSPITAL COURSE
52 yo F with PMH of Lyme disease, fibromyalgia, chronic back pain, restless leg syndrome, polysubstance abuse presented to ED with sudden onset upper and lower extremities weakness with difficulty speaking since 9 am ,when she woke up, completely reslved approx 6 hours later    #expressive aphasia+ both upper and lower ext weakness;  stroke code called  ,negative CT head   oni 2/2 substance abuse vs TIA, paer patient she's had 3 TIAs in the past     Pt did not wait till neurology has seen her and wants to leave ama.

## 2018-12-21 DIAGNOSIS — G89.29 OTHER CHRONIC PAIN: ICD-10-CM

## 2018-12-21 DIAGNOSIS — Z79.82 LONG TERM (CURRENT) USE OF ASPIRIN: ICD-10-CM

## 2018-12-21 DIAGNOSIS — R53.1 WEAKNESS: ICD-10-CM

## 2018-12-21 DIAGNOSIS — F41.9 ANXIETY DISORDER, UNSPECIFIED: ICD-10-CM

## 2018-12-21 DIAGNOSIS — R47.01 APHASIA: ICD-10-CM

## 2018-12-21 DIAGNOSIS — F17.200 NICOTINE DEPENDENCE, UNSPECIFIED, UNCOMPLICATED: ICD-10-CM

## 2018-12-21 DIAGNOSIS — M79.7 FIBROMYALGIA: ICD-10-CM

## 2018-12-21 DIAGNOSIS — F33.42 MAJOR DEPRESSIVE DISORDER, RECURRENT, IN FULL REMISSION: ICD-10-CM

## 2018-12-21 DIAGNOSIS — Z86.73 PERSONAL HISTORY OF TRANSIENT ISCHEMIC ATTACK (TIA), AND CEREBRAL INFARCTION WITHOUT RESIDUAL DEFICITS: ICD-10-CM

## 2018-12-21 DIAGNOSIS — M54.9 DORSALGIA, UNSPECIFIED: ICD-10-CM

## 2018-12-21 DIAGNOSIS — F13.20 SEDATIVE, HYPNOTIC OR ANXIOLYTIC DEPENDENCE, UNCOMPLICATED: ICD-10-CM

## 2018-12-21 DIAGNOSIS — E83.42 HYPOMAGNESEMIA: ICD-10-CM

## 2018-12-21 DIAGNOSIS — Z28.21 IMMUNIZATION NOT CARRIED OUT BECAUSE OF PATIENT REFUSAL: ICD-10-CM

## 2018-12-21 DIAGNOSIS — G25.81 RESTLESS LEGS SYNDROME: ICD-10-CM

## 2018-12-28 LAB — DRUG SCREEN, SERUM: ABNORMAL

## 2019-04-23 ENCOUNTER — EMERGENCY (EMERGENCY)
Facility: HOSPITAL | Age: 54
LOS: 0 days | Discharge: HOME | End: 2019-04-23
Attending: EMERGENCY MEDICINE | Admitting: EMERGENCY MEDICINE
Payer: COMMERCIAL

## 2019-04-23 VITALS
OXYGEN SATURATION: 99 % | RESPIRATION RATE: 18 BRPM | SYSTOLIC BLOOD PRESSURE: 135 MMHG | DIASTOLIC BLOOD PRESSURE: 70 MMHG | TEMPERATURE: 98 F | HEART RATE: 77 BPM

## 2019-04-23 DIAGNOSIS — Z90.49 ACQUIRED ABSENCE OF OTHER SPECIFIED PARTS OF DIGESTIVE TRACT: Chronic | ICD-10-CM

## 2019-04-23 DIAGNOSIS — Z79.891 LONG TERM (CURRENT) USE OF OPIATE ANALGESIC: ICD-10-CM

## 2019-04-23 DIAGNOSIS — F41.9 ANXIETY DISORDER, UNSPECIFIED: ICD-10-CM

## 2019-04-23 DIAGNOSIS — S52.90XA UNSPECIFIED FRACTURE OF UNSPECIFIED FOREARM, INITIAL ENCOUNTER FOR CLOSED FRACTURE: Chronic | ICD-10-CM

## 2019-04-23 DIAGNOSIS — Z98.891 HISTORY OF UTERINE SCAR FROM PREVIOUS SURGERY: Chronic | ICD-10-CM

## 2019-04-23 DIAGNOSIS — Z79.82 LONG TERM (CURRENT) USE OF ASPIRIN: ICD-10-CM

## 2019-04-23 DIAGNOSIS — Z79.899 OTHER LONG TERM (CURRENT) DRUG THERAPY: ICD-10-CM

## 2019-04-23 DIAGNOSIS — Z98.890 OTHER SPECIFIED POSTPROCEDURAL STATES: Chronic | ICD-10-CM

## 2019-04-23 PROCEDURE — 99283 EMERGENCY DEPT VISIT LOW MDM: CPT

## 2019-04-23 RX ORDER — ALPRAZOLAM 0.25 MG
1 TABLET ORAL ONCE
Qty: 0 | Refills: 0 | Status: DISCONTINUED | OUTPATIENT
Start: 2019-04-23 | End: 2019-04-23

## 2019-04-23 RX ADMIN — Medication 1 MILLIGRAM(S): at 09:49

## 2019-04-23 NOTE — ED ADULT TRIAGE NOTE - CHIEF COMPLAINT QUOTE
pt c/o anxiety due to just losing her son this morning. requesting anti anxiety medication. no thoughts of self harm.

## 2019-04-23 NOTE — ED PROVIDER NOTE - ATTENDING CONTRIBUTION TO CARE
54 F to ED with anxiety reaction after witnessing CPR and death of son in ED.  No medical complaints. h/o valium use but not with her now so evaluated for anxiety.  exam as noted.

## 2019-04-23 NOTE — ED PROVIDER NOTE - OBJECTIVE STATEMENT
55 yo F with hx of anxiety, presents for evaluation of anxiety. No cp, no back pain, no abdominal pain, no fever, no chills, no headache. Pt's son  today, she was at bedside and she felt anxious. She states she take valium but has none with her. No other symptoms reported

## 2019-07-11 ENCOUNTER — TRANSCRIPTION ENCOUNTER (OUTPATIENT)
Age: 54
End: 2019-07-11

## 2019-07-30 NOTE — PATIENT PROFILE ADULT. - FUNCTIONAL SCREEN CURRENT LEVEL: AMBULATION, MLM
58yo female with no pertinent pmh presents ith palpitations since 1pm and then left arm burning while in ER. Pt denies cp, sob, dizziness, headadache, NV. Pt sona palpitations x 4 hours 2 weeks ago, seen by PMD, had holter and has stress test and mult other tests tomorrow. Denies recent immobilization, surgery, long trips or plane rides, hormones/OCP, leg pain or swelling or family or personal history of blood clotting disorder. denies cardiac history.    ROS: No fever/chills. No photophobia/eye pain/changes in vision, No ear pain/sore throat/dysphagia, No chest pain/+palpitations. No SOB/cough. No abdominal pain, No N/V/D, no black/bloody bm. No dysuria/frequency/discharge, No headache. No Dizziness.  No rash.  No numbness/tingling/weakness.
(0) independent

## 2019-08-16 ENCOUNTER — TRANSCRIPTION ENCOUNTER (OUTPATIENT)
Age: 54
End: 2019-08-16

## 2019-09-09 ENCOUNTER — TRANSCRIPTION ENCOUNTER (OUTPATIENT)
Age: 54
End: 2019-09-09

## 2021-03-01 ENCOUNTER — TRANSCRIPTION ENCOUNTER (OUTPATIENT)
Age: 56
End: 2021-03-01

## 2021-03-10 ENCOUNTER — TRANSCRIPTION ENCOUNTER (OUTPATIENT)
Age: 56
End: 2021-03-10

## 2021-10-12 ENCOUNTER — OUTPATIENT (OUTPATIENT)
Dept: OUTPATIENT SERVICES | Facility: HOSPITAL | Age: 56
LOS: 1 days | Discharge: HOME | End: 2021-10-12

## 2021-10-12 DIAGNOSIS — Z98.890 OTHER SPECIFIED POSTPROCEDURAL STATES: Chronic | ICD-10-CM

## 2021-10-12 DIAGNOSIS — Z90.49 ACQUIRED ABSENCE OF OTHER SPECIFIED PARTS OF DIGESTIVE TRACT: Chronic | ICD-10-CM

## 2021-10-12 DIAGNOSIS — Z11.59 ENCOUNTER FOR SCREENING FOR OTHER VIRAL DISEASES: ICD-10-CM

## 2021-10-12 DIAGNOSIS — S52.90XA UNSPECIFIED FRACTURE OF UNSPECIFIED FOREARM, INITIAL ENCOUNTER FOR CLOSED FRACTURE: Chronic | ICD-10-CM

## 2021-10-12 DIAGNOSIS — Z98.891 HISTORY OF UTERINE SCAR FROM PREVIOUS SURGERY: Chronic | ICD-10-CM

## 2021-10-26 NOTE — ED ADULT NURSE NOTE - NSSISCREENINGQ2_ED_A_ED
Fabienne is a 17 month old who is being evaluated via a billable video visit.      How would you like to obtain your AVS? MyChart  If the video visit is dropped, the invitation should be resent by: Text to cell phone: [phone]  Will anyone else be joining your video visit? No      Video Start Time: 10:40 am    Assessment & Plan   Fabienne was seen today for fever.    Diagnoses and all orders for this visit:    Acute febrile illness in pediatric patient  -     Symptomatic COVID-19 Virus (Coronavirus) by PCR Nose; Future    Viral URI with cough  -     Symptomatic COVID-19 Virus (Coronavirus) by PCR Nose; Future    Fabienne appears well on video although she has had fever for 72 hours.  No difficulty breathing and mother reports that Fabienne is still voiding.  Will test for Covid-19.  Recommended continued supportive care and monitoring.  If fever doesn't resolve in 24-48 hours, Fabienne should have clinic appointment.  956}      Follow Up  Return if symptoms worsen or fever continues for another 24-48 hours.  If worsening symptoms, if difficulty breathing, or if refusing to drink and not having a wet diaper at least every 8 hours, she should be brought to ER.    Parris Newsome, DONNY CNP        Subjective   Fabienne is a 17 month old who presents for the following health issues  accompanied by her mother.    HPI     ENT/Cough Symptoms    Problem started: 3-4 days ago  Fever: Yes - Highest temperature: 102.5 Ear  Runny nose: YES-darker tinge to mucus this morning  Congestion: YES  Sore Throat: no  Cough: YES-productive, mother thinks it is from drainage down the back of her throat.  Eye discharge/redness:  no  Ear Pain: no  Wheeze: no   Sick contacts: Friend; a kid was playing with Fabienne on [place] [address] 10/17 who later tested positive for COVID.  Mother and sister tested negative for COVID.  Strep exposure: None;  Therapies Tried: Alternating Motrin and Tylenol    Fever started 3-4 days ago and responds to antipyretics.  She  has been very congested and mother reports that Fabienne has been breathing through her mouth.  Appetite is slightly decreased.  She is having wet diapers although diapers are a little lighter than normal.  She been crabby and seems tired.  Sleep has been restless.  No vomiting or diarrhea.      Mother and sister were ill last week although neither had fever.      Review of Systems   Constitutional, eye, ENT, skin, respiratory, cardiac, and GI are normal except as otherwise noted.      Objective           Vitals:  No vitals were obtained today due to virtual visit.    Physical Exam   GENERAL: interested in video - smiles and then runs off to play  SKIN: Clear. No significant rash, abnormal pigmentation or lesions  HEAD: Normocephalic.  NOSE: congested and clear to cloudy discharge    Diagnostics: None          Video-Visit Details    Type of service:  Video Visit    Video End Time:10:53 am    Originating Location (pt. Location): Home    Distant Location (provider location):  [facility]     Platform used for Video Visit: Polwire   No

## 2021-12-17 NOTE — CONSULT NOTE ADULT - ASSESSMENT
52 yo F with PMH of Lyme disease, fibromyalgia, chronic back pain, restless leg syndrome, polysubstance use presented to ED with 1 week of nightly fevers. Patient has multiple vague complaints in addition to her fevers including chills, shortness of breath, cough, chest pain, palpitations, and abdominal pain.    cardiology is being consulted for bradycardia;    #asymptomatic sinus bradycardia;  with PVCs on monitor  no dizziness or light headed ness now   hx of lyme disease ,no AV blocks on EKG   Stress test in 2015 (normal)  Echo in 2008 ; EF 60%   never had a cath   hx of drug abuse  likely 2/2 opioids and SSRI intake   avoid BB and CCB   out pt       >>ATTENDING NOTE TO BE FOLLOWED 54 yo F with PMH of Lyme disease, fibromyalgia, chronic back pain, restless leg syndrome, polysubstance use presented to ED with 1 week of nightly fevers. Patient has multiple vague complaints in addition to her fevers including chills, shortness of breath, cough, chest pain, palpitations, and abdominal pain.    cardiology is being consulted for bradycardia;    #asymptomatic sinus bradycardia;  with PVCs on monitor  no dizziness or light headed ness now   hx of lyme disease ,no AV blocks on EKG   Stress test in 2015 (normal)  Echo in 2008 ; EF 60%   never had a cath   hx of drug abuse  likely 2/2 opioids and SSRI intake   avoid BB and CCB   out pt event monitor  cw tele for 24 hr      >>ATTENDING NOTE TO BE FOLLOWED 54 yo F with PMH of Lyme disease, fibromyalgia, chronic back pain, restless leg syndrome, polysubstance use presented to ED with 1 week of nightly fevers. Patient has multiple vague complaints in addition to her fevers including chills, shortness of breath, cough, chest pain, palpitations, and abdominal pain.    cardiology is being consulted for bradycardia;    #asymptomatic sinus bradycardia;  with PVCs on monitor  no dizziness or light headed ness now   hx of lyme disease ,no AV blocks on EKG   Stress test in 2015 (normal)  Echo in 2008 ; EF 60%   never had a cath   hx of drug abuse  likely 2/2 opioids and SSRI intake   avoid BB and CCB   out pt event monitor  cw tele for 24 hr    Will F/U as an out patient. <-- Click to add NO pertinent Family History

## 2022-04-28 PROBLEM — Z00.00 ENCOUNTER FOR PREVENTIVE HEALTH EXAMINATION: Status: ACTIVE | Noted: 2022-04-28

## 2022-10-21 ENCOUNTER — APPOINTMENT (OUTPATIENT)
Dept: ORTHOPEDIC SURGERY | Facility: CLINIC | Age: 57
End: 2022-10-21

## 2022-10-21 DIAGNOSIS — M16.11 UNILATERAL PRIMARY OSTEOARTHRITIS, RIGHT HIP: ICD-10-CM

## 2022-10-21 PROCEDURE — 73521 X-RAY EXAM HIPS BI 2 VIEWS: CPT

## 2022-10-21 PROCEDURE — 99203 OFFICE O/P NEW LOW 30 MIN: CPT

## 2022-10-23 PROBLEM — M16.11 ARTHRITIS OF RIGHT HIP: Status: ACTIVE | Noted: 2022-10-23

## 2022-10-23 RX ORDER — DIAZEPAM 10 MG/1
10 TABLET ORAL
Qty: 30 | Refills: 0 | Status: ACTIVE | COMMUNITY
Start: 2022-04-27

## 2022-10-23 RX ORDER — ALBUTEROL SULFATE 90 UG/1
108 (90 BASE) INHALANT RESPIRATORY (INHALATION)
Qty: 18 | Refills: 0 | Status: ACTIVE | COMMUNITY
Start: 2022-04-27

## 2022-10-23 RX ORDER — AZITHROMYCIN 500 MG/1
500 TABLET, FILM COATED ORAL
Qty: 4 | Refills: 0 | Status: ACTIVE | COMMUNITY
Start: 2022-04-27

## 2022-11-29 ENCOUNTER — RESULT REVIEW (OUTPATIENT)
Age: 57
End: 2022-11-29

## 2022-11-29 ENCOUNTER — OUTPATIENT (OUTPATIENT)
Dept: OUTPATIENT SERVICES | Facility: HOSPITAL | Age: 57
LOS: 1 days | Discharge: HOME | End: 2022-11-29

## 2022-11-29 VITALS
WEIGHT: 154.98 LBS | HEART RATE: 82 BPM | TEMPERATURE: 98 F | OXYGEN SATURATION: 95 % | RESPIRATION RATE: 14 BRPM | SYSTOLIC BLOOD PRESSURE: 129 MMHG | DIASTOLIC BLOOD PRESSURE: 77 MMHG | HEIGHT: 70 IN

## 2022-11-29 DIAGNOSIS — M16.11 UNILATERAL PRIMARY OSTEOARTHRITIS, RIGHT HIP: ICD-10-CM

## 2022-11-29 DIAGNOSIS — S52.90XA UNSPECIFIED FRACTURE OF UNSPECIFIED FOREARM, INITIAL ENCOUNTER FOR CLOSED FRACTURE: Chronic | ICD-10-CM

## 2022-11-29 DIAGNOSIS — Z98.890 OTHER SPECIFIED POSTPROCEDURAL STATES: Chronic | ICD-10-CM

## 2022-11-29 DIAGNOSIS — Z01.818 ENCOUNTER FOR OTHER PREPROCEDURAL EXAMINATION: ICD-10-CM

## 2022-11-29 DIAGNOSIS — Z90.49 ACQUIRED ABSENCE OF OTHER SPECIFIED PARTS OF DIGESTIVE TRACT: Chronic | ICD-10-CM

## 2022-11-29 DIAGNOSIS — Z98.891 HISTORY OF UTERINE SCAR FROM PREVIOUS SURGERY: Chronic | ICD-10-CM

## 2022-11-29 LAB
A1C WITH ESTIMATED AVERAGE GLUCOSE RESULT: 5.2 % — SIGNIFICANT CHANGE UP (ref 4–5.6)
ALBUMIN SERPL ELPH-MCNC: 4.4 G/DL — SIGNIFICANT CHANGE UP (ref 3.5–5.2)
ALP SERPL-CCNC: 107 U/L — SIGNIFICANT CHANGE UP (ref 30–115)
ALT FLD-CCNC: 9 U/L — SIGNIFICANT CHANGE UP (ref 0–41)
ANION GAP SERPL CALC-SCNC: 12 MMOL/L — SIGNIFICANT CHANGE UP (ref 7–14)
APTT BLD: 31.9 SEC — SIGNIFICANT CHANGE UP (ref 27–39.2)
AST SERPL-CCNC: 15 U/L — SIGNIFICANT CHANGE UP (ref 0–41)
BASOPHILS # BLD AUTO: 0.06 K/UL — SIGNIFICANT CHANGE UP (ref 0–0.2)
BASOPHILS NFR BLD AUTO: 1.1 % — HIGH (ref 0–1)
BILIRUB SERPL-MCNC: <0.2 MG/DL — SIGNIFICANT CHANGE UP (ref 0.2–1.2)
BLD GP AB SCN SERPL QL: SIGNIFICANT CHANGE UP
BUN SERPL-MCNC: 12 MG/DL — SIGNIFICANT CHANGE UP (ref 10–20)
CALCIUM SERPL-MCNC: 9.7 MG/DL — SIGNIFICANT CHANGE UP (ref 8.4–10.5)
CHLORIDE SERPL-SCNC: 106 MMOL/L — SIGNIFICANT CHANGE UP (ref 98–110)
CO2 SERPL-SCNC: 21 MMOL/L — SIGNIFICANT CHANGE UP (ref 17–32)
CREAT SERPL-MCNC: 0.8 MG/DL — SIGNIFICANT CHANGE UP (ref 0.7–1.5)
EGFR: 85 ML/MIN/1.73M2 — SIGNIFICANT CHANGE UP
EOSINOPHIL # BLD AUTO: 0.35 K/UL — SIGNIFICANT CHANGE UP (ref 0–0.7)
EOSINOPHIL NFR BLD AUTO: 6.7 % — SIGNIFICANT CHANGE UP (ref 0–8)
ESTIMATED AVERAGE GLUCOSE: 103 MG/DL — SIGNIFICANT CHANGE UP (ref 68–114)
GLUCOSE SERPL-MCNC: 93 MG/DL — SIGNIFICANT CHANGE UP (ref 70–99)
HCT VFR BLD CALC: 38.6 % — SIGNIFICANT CHANGE UP (ref 37–47)
HGB BLD-MCNC: 12.1 G/DL — SIGNIFICANT CHANGE UP (ref 12–16)
IMM GRANULOCYTES NFR BLD AUTO: 0.2 % — SIGNIFICANT CHANGE UP (ref 0.1–0.3)
INR BLD: 0.9 RATIO — SIGNIFICANT CHANGE UP (ref 0.65–1.3)
LYMPHOCYTES # BLD AUTO: 1.75 K/UL — SIGNIFICANT CHANGE UP (ref 1.2–3.4)
LYMPHOCYTES # BLD AUTO: 33.5 % — SIGNIFICANT CHANGE UP (ref 20.5–51.1)
MCHC RBC-ENTMCNC: 27.4 PG — SIGNIFICANT CHANGE UP (ref 27–31)
MCHC RBC-ENTMCNC: 31.3 G/DL — LOW (ref 32–37)
MCV RBC AUTO: 87.3 FL — SIGNIFICANT CHANGE UP (ref 81–99)
MONOCYTES # BLD AUTO: 0.6 K/UL — SIGNIFICANT CHANGE UP (ref 0.1–0.6)
MONOCYTES NFR BLD AUTO: 11.5 % — HIGH (ref 1.7–9.3)
MRSA PCR RESULT.: NEGATIVE — SIGNIFICANT CHANGE UP
NEUTROPHILS # BLD AUTO: 2.46 K/UL — SIGNIFICANT CHANGE UP (ref 1.4–6.5)
NEUTROPHILS NFR BLD AUTO: 47 % — SIGNIFICANT CHANGE UP (ref 42.2–75.2)
NRBC # BLD: 0 /100 WBCS — SIGNIFICANT CHANGE UP (ref 0–0)
PLATELET # BLD AUTO: 276 K/UL — SIGNIFICANT CHANGE UP (ref 130–400)
POTASSIUM SERPL-MCNC: 5.2 MMOL/L — HIGH (ref 3.5–5)
POTASSIUM SERPL-SCNC: 5.2 MMOL/L — HIGH (ref 3.5–5)
PROT SERPL-MCNC: 7.7 G/DL — SIGNIFICANT CHANGE UP (ref 6–8)
PROTHROM AB SERPL-ACNC: 10.2 SEC — SIGNIFICANT CHANGE UP (ref 9.95–12.87)
RBC # BLD: 4.42 M/UL — SIGNIFICANT CHANGE UP (ref 4.2–5.4)
RBC # FLD: 14.5 % — SIGNIFICANT CHANGE UP (ref 11.5–14.5)
SODIUM SERPL-SCNC: 139 MMOL/L — SIGNIFICANT CHANGE UP (ref 135–146)
WBC # BLD: 5.23 K/UL — SIGNIFICANT CHANGE UP (ref 4.8–10.8)
WBC # FLD AUTO: 5.23 K/UL — SIGNIFICANT CHANGE UP (ref 4.8–10.8)

## 2022-11-29 PROCEDURE — 73502 X-RAY EXAM HIP UNI 2-3 VIEWS: CPT | Mod: 26,RT

## 2022-11-29 PROCEDURE — 71046 X-RAY EXAM CHEST 2 VIEWS: CPT | Mod: 26

## 2022-11-29 PROCEDURE — 93010 ELECTROCARDIOGRAM REPORT: CPT

## 2022-11-29 RX ORDER — ASPIRIN/CALCIUM CARB/MAGNESIUM 324 MG
1 TABLET ORAL
Qty: 0 | Refills: 0 | DISCHARGE

## 2022-11-29 NOTE — H&P PST ADULT - NSICDXPASTMEDICALHX_GEN_ALL_CORE_FT
PAST MEDICAL HISTORY:  Back pain, chronic     Fibromyalgia     Former smoker     H/O fracture of rib 10+ yeras ago    Lyme disease     Polysubstance dependence     Restless leg syndrome     Right hip pain      PAST MEDICAL HISTORY:  Back pain, chronic     Cerebrovascular accident (CVA) in 2016 and 2018 NO residual    Fibromyalgia     Former smoker     H/O fracture of rib 10+ yeras ago    Lyme disease     Polysubstance dependence     Restless leg syndrome     Right hip pain

## 2022-11-29 NOTE — HISTORY OF PRESENT ILLNESS
[de-identified] : HISTORY\par Right hip pain.\par Mechanical pain made worse with activity, not completely improved with rest, interfering with ADLs.\par At this point nonoperative management of the symptoms is ineffective and the patient has interest in moving forward with a joint replacement.\par \par Past medical history is on the chart for review and as mentioned above.\par \par Using percocet 10mg q6, tapering off narcotic medication discussed with patient.\par ROS is negative for fever, chills, CP, SOB, unexplained weight loss or gain.\par \par \par \par EXAM\par Right hip is painful with PROM and limited in flexion and internal rotation.\par There is significant guarding throughout the exam limiting ROM testing but no obvious contractures.\par NVID\par Decreased hip flexion strength compared to contralateral side.\par \par \par Imaging:\par X-rays AP Pelvis and Frog Lateral of the hips shows advanced degenerative changes including loss of the joint space with bone to bone apposition, osteophytes, subchondral sclerosis and cystic changes.\par \par \par Assessment: 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  Benefits  were described as improvement in pain and function.  Risks including bleeding, infection, blood clots, DVT, PE, stroke, heart attack, fracture, dislocation, leg length discrepancy, nerve palsy, neurovascular injury, persistent pain, RSD, and in rare cases death.\par Should the implant not function as expected or wear out then revision surgery may be required in the future. \par \par We discussed pain management post op, it is possible patient may experience more postoperative pain because of her exposure to narcotic medications.\par \par We will proceed with scheduling the procedure.\par \par ADDENDUM: WEGHT , HEIGHTIS 5 FOOT 9 INCHESBMI IS 22\par SHE HAS NOT SMOKED IN 2 YEARS.

## 2022-11-29 NOTE — H&P PST ADULT - NSICDXPASTSURGICALHX_GEN_ALL_CORE_FT
PAST SURGICAL HISTORY:  Fracture, radius     History of appendectomy     History of  section     History of gastric surgery

## 2022-11-29 NOTE — H&P PST ADULT - HISTORY OF PRESENT ILLNESS
56yo female presents for PAST in preparation for right THR   Pt compains of constant  chronic right hip pain for 4 years . Pt reports pain 4/10 with rest, and 10/10 with activity. Pt takes Oxycodone 10 mg  for pain with some relief. Pain is radiating to the knee  Pain is decribed as  stabbing with "burning knife".  Associated symptoms are decrease mobility and the right leg "gives up"   . Denies any chest pain, difficulty breathing, SOB, palpitations, dysuria, URI, or any other infections in the last 2 weeks/1 month. Denies any recent travel, contact, or exposure to any persons with known or suspected COVID-19. Pt also denies COVID testing within the last 2 weeks. Pt advised to self quarantine until day of procedure. Exercise tolerance of "as long as have to"  without dyspnea. GEORGE reviewed with patient.    Anesthesia Alert  NO--Difficult Airway  NO--History of neck surgery or radiation  NO--Limited ROM of neck  NO--History of Malignant hyperthermia  NO--Personal or family history of Pseudocholinesterase deficiency.  NO--Prior Anesthesia Complication  NO--Latex Allergy  NO--Loose teeth  NO--History of Rheumatoid Arthritis  NO--GEORGE  NO--Bleeding risk  NO--Other   written and verbal instructions with teach back on chlorhexidine shampoo provided,  pt verbalized understanding with returned demonstration  Patient verbalized understanding of instructions and was given the opportunity to ask questions and have them answered.

## 2022-11-29 NOTE — H&P PST ADULT - REASON FOR ADMISSION
Important Information  Case Type: OP Block TimeSuite: OR SouthProceduralist: Frankie Zamora Hip-serrano  Confirmed Surgery DateTime: 12- - 0:00PAST DateTime: 11- - 6:30Procedure: Right total hip replacement  ERP?: YesLaterality: RightLength of Procedure: 120 Minutes  Anesthesia Type: Regional  COVID-19 VACCINATION STATUS  Vaccinated - with Proof  Vaccination Type: Ron  1st Dose received on: 04-

## 2022-12-04 ENCOUNTER — LABORATORY RESULT (OUTPATIENT)
Age: 57
End: 2022-12-04

## 2022-12-06 ENCOUNTER — FORM ENCOUNTER (OUTPATIENT)
Age: 57
End: 2022-12-06

## 2022-12-11 ENCOUNTER — LABORATORY RESULT (OUTPATIENT)
Age: 57
End: 2022-12-11

## 2022-12-14 ENCOUNTER — APPOINTMENT (OUTPATIENT)
Dept: ORTHOPEDIC SURGERY | Facility: HOSPITAL | Age: 57
End: 2022-12-14

## 2022-12-15 PROBLEM — Z87.81 PERSONAL HISTORY OF (HEALED) TRAUMATIC FRACTURE: Chronic | Status: ACTIVE | Noted: 2022-11-29

## 2022-12-15 PROBLEM — I63.9 CEREBRAL INFARCTION, UNSPECIFIED: Chronic | Status: ACTIVE | Noted: 2022-11-29

## 2022-12-15 PROBLEM — M25.551 PAIN IN RIGHT HIP: Chronic | Status: ACTIVE | Noted: 2022-11-29

## 2022-12-18 ENCOUNTER — NON-APPOINTMENT (OUTPATIENT)
Age: 57
End: 2022-12-18

## 2022-12-25 ENCOUNTER — LABORATORY RESULT (OUTPATIENT)
Age: 57
End: 2022-12-25

## 2022-12-28 ENCOUNTER — TRANSCRIPTION ENCOUNTER (OUTPATIENT)
Age: 57
End: 2022-12-28

## 2022-12-28 ENCOUNTER — RESULT REVIEW (OUTPATIENT)
Age: 57
End: 2022-12-28

## 2022-12-28 ENCOUNTER — INPATIENT (INPATIENT)
Facility: HOSPITAL | Age: 57
LOS: 0 days | Discharge: HOME HEALTH | End: 2022-12-29
Attending: ORTHOPAEDIC SURGERY | Admitting: ORTHOPAEDIC SURGERY
Payer: MEDICAID

## 2022-12-28 ENCOUNTER — APPOINTMENT (OUTPATIENT)
Dept: ORTHOPEDIC SURGERY | Facility: HOSPITAL | Age: 57
End: 2022-12-28
Payer: MEDICAID

## 2022-12-28 VITALS
SYSTOLIC BLOOD PRESSURE: 140 MMHG | HEIGHT: 70 IN | WEIGHT: 154.98 LBS | TEMPERATURE: 97 F | RESPIRATION RATE: 17 BRPM | DIASTOLIC BLOOD PRESSURE: 82 MMHG | OXYGEN SATURATION: 97 % | HEART RATE: 81 BPM

## 2022-12-28 DIAGNOSIS — M16.9 OSTEOARTHRITIS OF HIP, UNSPECIFIED: ICD-10-CM

## 2022-12-28 DIAGNOSIS — Z98.891 HISTORY OF UTERINE SCAR FROM PREVIOUS SURGERY: Chronic | ICD-10-CM

## 2022-12-28 DIAGNOSIS — S52.90XA UNSPECIFIED FRACTURE OF UNSPECIFIED FOREARM, INITIAL ENCOUNTER FOR CLOSED FRACTURE: Chronic | ICD-10-CM

## 2022-12-28 DIAGNOSIS — Z90.49 ACQUIRED ABSENCE OF OTHER SPECIFIED PARTS OF DIGESTIVE TRACT: Chronic | ICD-10-CM

## 2022-12-28 DIAGNOSIS — Z98.890 OTHER SPECIFIED POSTPROCEDURAL STATES: Chronic | ICD-10-CM

## 2022-12-28 LAB
BLD GP AB SCN SERPL QL: SIGNIFICANT CHANGE UP
GLUCOSE BLDC GLUCOMTR-MCNC: 106 MG/DL — HIGH (ref 70–99)

## 2022-12-28 PROCEDURE — 88305 TISSUE EXAM BY PATHOLOGIST: CPT | Mod: 26

## 2022-12-28 PROCEDURE — 27130 TOTAL HIP ARTHROPLASTY: CPT | Mod: RT

## 2022-12-28 PROCEDURE — 88311 DECALCIFY TISSUE: CPT | Mod: 26

## 2022-12-28 RX ORDER — CELECOXIB 200 MG/1
400 CAPSULE ORAL ONCE
Refills: 0 | Status: COMPLETED | OUTPATIENT
Start: 2022-12-28 | End: 2022-12-28

## 2022-12-28 RX ORDER — POLYETHYLENE GLYCOL 3350 17 G/17G
17 POWDER, FOR SOLUTION ORAL AT BEDTIME
Refills: 0 | Status: DISCONTINUED | OUTPATIENT
Start: 2022-12-28 | End: 2022-12-29

## 2022-12-28 RX ORDER — OXYCODONE HYDROCHLORIDE 5 MG/1
10 TABLET ORAL EVERY 4 HOURS
Refills: 0 | Status: DISCONTINUED | OUTPATIENT
Start: 2022-12-28 | End: 2022-12-29

## 2022-12-28 RX ORDER — ZOLPIDEM TARTRATE 10 MG/1
5 TABLET ORAL AT BEDTIME
Refills: 0 | Status: DISCONTINUED | OUTPATIENT
Start: 2022-12-28 | End: 2022-12-29

## 2022-12-28 RX ORDER — ASPIRIN/CALCIUM CARB/MAGNESIUM 324 MG
81 TABLET ORAL
Refills: 0 | Status: DISCONTINUED | OUTPATIENT
Start: 2022-12-29 | End: 2022-12-29

## 2022-12-28 RX ORDER — FLUOXETINE HCL 10 MG
1 CAPSULE ORAL
Qty: 0 | Refills: 0 | DISCHARGE

## 2022-12-28 RX ORDER — RANITIDINE HYDROCHLORIDE 150 MG/1
1 TABLET, FILM COATED ORAL
Qty: 0 | Refills: 0 | DISCHARGE

## 2022-12-28 RX ORDER — ONDANSETRON 8 MG/1
4 TABLET, FILM COATED ORAL EVERY 6 HOURS
Refills: 0 | Status: DISCONTINUED | OUTPATIENT
Start: 2022-12-28 | End: 2022-12-29

## 2022-12-28 RX ORDER — CHLORHEXIDINE GLUCONATE 213 G/1000ML
1 SOLUTION TOPICAL
Refills: 0 | Status: DISCONTINUED | OUTPATIENT
Start: 2022-12-28 | End: 2022-12-29

## 2022-12-28 RX ORDER — OXYCODONE HYDROCHLORIDE 5 MG/1
1 TABLET ORAL
Qty: 0 | Refills: 0 | DISCHARGE

## 2022-12-28 RX ORDER — MAGNESIUM HYDROXIDE 400 MG/1
30 TABLET, CHEWABLE ORAL DAILY
Refills: 0 | Status: DISCONTINUED | OUTPATIENT
Start: 2022-12-28 | End: 2022-12-29

## 2022-12-28 RX ORDER — ACETAMINOPHEN 500 MG
650 TABLET ORAL EVERY 6 HOURS
Refills: 0 | Status: DISCONTINUED | OUTPATIENT
Start: 2022-12-28 | End: 2022-12-29

## 2022-12-28 RX ORDER — HYDROMORPHONE HYDROCHLORIDE 2 MG/ML
0.5 INJECTION INTRAMUSCULAR; INTRAVENOUS; SUBCUTANEOUS
Refills: 0 | Status: DISCONTINUED | OUTPATIENT
Start: 2022-12-28 | End: 2022-12-28

## 2022-12-28 RX ORDER — KETOROLAC TROMETHAMINE 30 MG/ML
15 SYRINGE (ML) INJECTION EVERY 6 HOURS
Refills: 0 | Status: DISCONTINUED | OUTPATIENT
Start: 2022-12-28 | End: 2022-12-29

## 2022-12-28 RX ORDER — SENNA PLUS 8.6 MG/1
2 TABLET ORAL AT BEDTIME
Refills: 0 | Status: DISCONTINUED | OUTPATIENT
Start: 2022-12-28 | End: 2022-12-29

## 2022-12-28 RX ORDER — TRAMADOL HYDROCHLORIDE 50 MG/1
50 TABLET ORAL EVERY 4 HOURS
Refills: 0 | Status: DISCONTINUED | OUTPATIENT
Start: 2022-12-28 | End: 2022-12-29

## 2022-12-28 RX ORDER — CEFAZOLIN SODIUM 1 G
2000 VIAL (EA) INJECTION EVERY 8 HOURS
Refills: 0 | Status: COMPLETED | OUTPATIENT
Start: 2022-12-28 | End: 2022-12-29

## 2022-12-28 RX ORDER — ACETAMINOPHEN 500 MG
1000 TABLET ORAL ONCE
Refills: 0 | Status: COMPLETED | OUTPATIENT
Start: 2022-12-28 | End: 2022-12-28

## 2022-12-28 RX ORDER — SODIUM CHLORIDE 9 MG/ML
1000 INJECTION, SOLUTION INTRAVENOUS
Refills: 0 | Status: DISCONTINUED | OUTPATIENT
Start: 2022-12-28 | End: 2022-12-28

## 2022-12-28 RX ORDER — PANTOPRAZOLE SODIUM 20 MG/1
40 TABLET, DELAYED RELEASE ORAL
Refills: 0 | Status: DISCONTINUED | OUTPATIENT
Start: 2022-12-28 | End: 2022-12-29

## 2022-12-28 RX ORDER — SODIUM CHLORIDE 9 MG/ML
1000 INJECTION INTRAMUSCULAR; INTRAVENOUS; SUBCUTANEOUS
Refills: 0 | Status: DISCONTINUED | OUTPATIENT
Start: 2022-12-28 | End: 2022-12-29

## 2022-12-28 RX ORDER — CELECOXIB 200 MG/1
200 CAPSULE ORAL EVERY 12 HOURS
Refills: 0 | Status: DISCONTINUED | OUTPATIENT
Start: 2022-12-29 | End: 2022-12-29

## 2022-12-28 RX ORDER — ZOLPIDEM TARTRATE 10 MG/1
1 TABLET ORAL
Qty: 0 | Refills: 0 | DISCHARGE

## 2022-12-28 RX ORDER — ROPINIROLE 8 MG/1
0.25 TABLET, FILM COATED, EXTENDED RELEASE ORAL AT BEDTIME
Refills: 0 | Status: DISCONTINUED | OUTPATIENT
Start: 2022-12-28 | End: 2022-12-29

## 2022-12-28 RX ADMIN — TRAMADOL HYDROCHLORIDE 50 MILLIGRAM(S): 50 TABLET ORAL at 21:35

## 2022-12-28 RX ADMIN — TRAMADOL HYDROCHLORIDE 50 MILLIGRAM(S): 50 TABLET ORAL at 23:49

## 2022-12-28 RX ADMIN — OXYCODONE HYDROCHLORIDE 10 MILLIGRAM(S): 5 TABLET ORAL at 23:49

## 2022-12-28 RX ADMIN — ZOLPIDEM TARTRATE 5 MILLIGRAM(S): 10 TABLET ORAL at 22:16

## 2022-12-28 RX ADMIN — ROPINIROLE 0.25 MILLIGRAM(S): 8 TABLET, FILM COATED, EXTENDED RELEASE ORAL at 22:01

## 2022-12-28 RX ADMIN — Medication 650 MILLIGRAM(S): at 17:49

## 2022-12-28 RX ADMIN — Medication 1000 MILLIGRAM(S): at 15:34

## 2022-12-28 RX ADMIN — Medication 650 MILLIGRAM(S): at 18:43

## 2022-12-28 RX ADMIN — Medication 1000 MILLIGRAM(S): at 10:13

## 2022-12-28 RX ADMIN — Medication 15 MILLIGRAM(S): at 17:49

## 2022-12-28 RX ADMIN — Medication 150 MILLIGRAM(S): at 18:18

## 2022-12-28 RX ADMIN — SENNA PLUS 2 TABLET(S): 8.6 TABLET ORAL at 22:01

## 2022-12-28 RX ADMIN — CELECOXIB 400 MILLIGRAM(S): 200 CAPSULE ORAL at 10:12

## 2022-12-28 RX ADMIN — SODIUM CHLORIDE 75 MILLILITER(S): 9 INJECTION, SOLUTION INTRAVENOUS at 15:39

## 2022-12-28 RX ADMIN — CELECOXIB 400 MILLIGRAM(S): 200 CAPSULE ORAL at 15:34

## 2022-12-28 RX ADMIN — Medication 100 MILLIGRAM(S): at 22:01

## 2022-12-28 RX ADMIN — OXYCODONE HYDROCHLORIDE 10 MILLIGRAM(S): 5 TABLET ORAL at 22:01

## 2022-12-28 RX ADMIN — Medication 15 MILLIGRAM(S): at 18:43

## 2022-12-28 NOTE — ASU PATIENT PROFILE, ADULT - FALL HARM RISK - RISK INTERVENTIONS

## 2022-12-28 NOTE — PATIENT PROFILE ADULT - FALL HARM RISK - HARM RISK INTERVENTIONS
Assistance with ambulation/Assistance OOB with selected safe patient handling equipment/Communicate Risk of Fall with Harm to all staff/Discuss with provider need for PT consult/Monitor gait and stability/Provide patient with walking aids - walker, cane, crutches/Reinforce activity limits and safety measures with patient and family/Sit up slowly, dangle for a short time, stand at bedside before walking/Tailored Fall Risk Interventions/Use of alarms - bed, chair and/or voice tab/Visual Cue: Yellow wristband and red socks/Bed in lowest position, wheels locked, appropriate side rails in place/Call bell, personal items and telephone in reach/Instruct patient to call for assistance before getting out of bed or chair/Non-slip footwear when patient is out of bed/Venus to call system/Physically safe environment - no spills, clutter or unnecessary equipment/Purposeful Proactive Rounding/Room/bathroom lighting operational, light cord in reach

## 2022-12-28 NOTE — ASU PATIENT PROFILE, ADULT - NSICDXPASTMEDICALHX_GEN_ALL_CORE_FT
PAST MEDICAL HISTORY:  Back pain, chronic     Cerebrovascular accident (CVA) in 2016 and 2018 NO residual    Fibromyalgia     Former smoker     H/O fracture of rib 10+ yeras ago    Lyme disease     Polysubstance dependence     Restless leg syndrome     Right hip pain

## 2022-12-28 NOTE — PHYSICAL THERAPY INITIAL EVALUATION ADULT - GENERAL OBSERVATIONS, REHAB EVAL
19:00-19:30. Chart reviewed; confirmed with RN to see the pt for PT. Pt ready for PT; received in bed with complain of pain in R hip. +hep lock R UE, +Aquacel R hip. Agreeable for PT evaluation.

## 2022-12-28 NOTE — PHYSICAL THERAPY INITIAL EVALUATION ADULT - LIVES WITH, PROFILE
Pt lives with  in a private home with ~ 5 steps to enter with no handrails and 14 steps inside with one handrail./spouse

## 2022-12-28 NOTE — DISCHARGE NOTE PROVIDER - CARE PROVIDER_API CALL
Frankie Zavaleta)  Orthopaedic Surgery  [address] [place] [address]  Phone: [phone]  Fax: [phone]  Established Patient  Follow Up Time: Routine

## 2022-12-28 NOTE — DISCHARGE NOTE PROVIDER - NSDCFUSCHEDAPPT_GEN_ALL_CORE_FT
Frankie Douglas  [facility] Physician [facility]  [facility] [address]  Scheduled Appointment: 01/19/2023

## 2022-12-28 NOTE — PHYSICAL THERAPY INITIAL EVALUATION ADULT - PERTINENT HX OF CURRENT PROBLEM, REHAB EVAL
Pt is a 56 y/o female with dx of elective R THR with h/o R hip OA under regional anesthesia seen pod # 0.

## 2022-12-28 NOTE — ASU PREOP CHECKLIST - SELECT TESTS ORDERED
Cam Craw  
  
  
     [address] [facility] [address] Allergist  
Phone: 
Fax:   
  [phone] [facility], [address] [facility] [address] [address] [address] [address] Orthopedist  
Phone: 
Fax:   
  [phone] Santy Izaguirre  
  
  
     [address] [address] [address] [facility] [place] [address] Neurologist for snoring Phone: 
Fax:   
  [phone] 106/Type and Screen/POCT Blood Glucose 106/Type and Screen/POCT Blood Glucose/COVID-19

## 2022-12-28 NOTE — PROGRESS NOTE ADULT - SUBJECTIVE AND OBJECTIVE BOX
[facility] OR[facility]OPEDIC  POST OP CHECK     T(C): 35.8 (12-28-22 @ 14:43), Max: 36 (12-28-22 @ 10:15)  HR: 60 (12-28-22 @ 16:13) (60 - 81)  BP: 150/78 (12-28-22 @ 16:13) (97/60 - 154/70)  RR: 16 (12-28-22 @ 16:13) (16 - 18)  SpO2: 100% (12-28-22 @ 16:13) (97% - 100%)    preop hgb 12.1  preopbs 106              S/P [facility] ARTHROPLASTY  PAIN CONTROLLED  VSS   A&O X3  DRESSING Aquacell is in place   NVI  ANTIBIOTICS INFUSED  DVT PROPHYLAXIS ORDERED  ENCOURAGE INCENTIVE SPIROMETRY  AM LABS  REHAB TO BEGIN on  pod 0  D/C PLANNING pod 1

## 2022-12-28 NOTE — DISCHARGE NOTE PROVIDER - NSDCMRMEDTOKEN_GEN_ALL_CORE_FT
Ambien 10 mg oral tablet: 1 tab(s) orally once a day (at bedtime)  Benadryl:   diazePAM 10 mg oral tablet: 1 tab(s) orally prn, As Needed  docusate sodium 100 mg oral capsule: 1 cap(s) orally 3 times a day  Fioricet oral capsule: 1 cap(s) orally every 4 hours  Lyrica 150 mg oral capsule: 1 cap(s) orally 2 times a day  oxyCODONE 10 mg oral tablet: 1 tab(s) orally every 6 hours, As Needed  rOPINIRole 0.25 mg oral tablet: 1 tab(s) orally once a day (at bedtime)  senna oral tablet: 2 tab(s) orally once a day (at bedtime), As needed, Constipation  Zantac 150 oral tablet: 1 tab(s) orally 2 times a day   acetaminophen 325 mg oral tablet: 2 tab(s) orally every 6 hours MDD:8  Ambien 10 mg oral tablet: 1 tab(s) orally once a day (at bedtime)  aspirin 81 mg oral delayed release tablet: 1 tab(s) orally 2 times a day MDD:2  Benadryl:   celecoxib 200 mg oral capsule: 1 cap(s) orally every 12 hours MDD:2  diazePAM 10 mg oral tablet: 1 tab(s) orally prn, As Needed  docusate sodium 100 mg oral capsule: 1 cap(s) orally 3 times a day  Fioricet oral capsule: 1 cap(s) orally every 4 hours  Lyrica 150 mg oral capsule: 1 cap(s) orally 2 times a day  oxyCODONE 10 mg oral tablet: 1 tab(s) orally every 6 hours, As Needed  pantoprazole 40 mg oral delayed release tablet: 1 tab(s) orally once a day (before a meal) MDD:1  rOPINIRole 0.25 mg oral tablet: 1 tab(s) orally once a day (at bedtime)  senna oral tablet: 2 tab(s) orally once a day (at bedtime), As needed, Constipation  traMADol 50 mg oral tablet: 1 tab(s) orally every 6 hours, As Needed -Mild Pain (1 - 3) MDD:4  Zantac 150 oral tablet: 1 tab(s) orally 2 times a day

## 2022-12-29 ENCOUNTER — APPOINTMENT (OUTPATIENT)
Dept: ORTHOPEDIC SURGERY | Facility: CLINIC | Age: 57
End: 2022-12-29

## 2022-12-29 ENCOUNTER — FORM ENCOUNTER (OUTPATIENT)
Age: 57
End: 2022-12-29

## 2022-12-29 ENCOUNTER — TRANSCRIPTION ENCOUNTER (OUTPATIENT)
Age: 57
End: 2022-12-29

## 2022-12-29 VITALS
DIASTOLIC BLOOD PRESSURE: 79 MMHG | RESPIRATION RATE: 16 BRPM | TEMPERATURE: 99 F | HEART RATE: 84 BPM | SYSTOLIC BLOOD PRESSURE: 126 MMHG | OXYGEN SATURATION: 100 %

## 2022-12-29 LAB
ANION GAP SERPL CALC-SCNC: 10 MMOL/L — SIGNIFICANT CHANGE UP (ref 7–14)
BUN SERPL-MCNC: 9 MG/DL — LOW (ref 10–20)
CALCIUM SERPL-MCNC: 8.5 MG/DL — SIGNIFICANT CHANGE UP (ref 8.4–10.5)
CHLORIDE SERPL-SCNC: 107 MMOL/L — SIGNIFICANT CHANGE UP (ref 98–110)
CO2 SERPL-SCNC: 22 MMOL/L — SIGNIFICANT CHANGE UP (ref 17–32)
CREAT SERPL-MCNC: 0.8 MG/DL — SIGNIFICANT CHANGE UP (ref 0.7–1.5)
EGFR: 85 ML/MIN/1.73M2 — SIGNIFICANT CHANGE UP
GLUCOSE SERPL-MCNC: 90 MG/DL — SIGNIFICANT CHANGE UP (ref 70–99)
HCT VFR BLD CALC: 30.1 % — LOW (ref 37–47)
HGB BLD-MCNC: 9.5 G/DL — LOW (ref 12–16)
MCHC RBC-ENTMCNC: 27.2 PG — SIGNIFICANT CHANGE UP (ref 27–31)
MCHC RBC-ENTMCNC: 31.6 G/DL — LOW (ref 32–37)
MCV RBC AUTO: 86.2 FL — SIGNIFICANT CHANGE UP (ref 81–99)
NRBC # BLD: 0 /100 WBCS — SIGNIFICANT CHANGE UP (ref 0–0)
PLATELET # BLD AUTO: 216 K/UL — SIGNIFICANT CHANGE UP (ref 130–400)
POTASSIUM SERPL-MCNC: 4.4 MMOL/L — SIGNIFICANT CHANGE UP (ref 3.5–5)
POTASSIUM SERPL-SCNC: 4.4 MMOL/L — SIGNIFICANT CHANGE UP (ref 3.5–5)
RBC # BLD: 3.49 M/UL — LOW (ref 4.2–5.4)
RBC # FLD: 14.3 % — SIGNIFICANT CHANGE UP (ref 11.5–14.5)
SODIUM SERPL-SCNC: 139 MMOL/L — SIGNIFICANT CHANGE UP (ref 135–146)
WBC # BLD: 8.81 K/UL — SIGNIFICANT CHANGE UP (ref 4.8–10.8)
WBC # FLD AUTO: 8.81 K/UL — SIGNIFICANT CHANGE UP (ref 4.8–10.8)

## 2022-12-29 PROCEDURE — ZZZZZ: CPT

## 2022-12-29 RX ORDER — TRAMADOL HYDROCHLORIDE 50 MG/1
1 TABLET ORAL
Qty: 20 | Refills: 0
Start: 2022-12-29 | End: 2023-01-02

## 2022-12-29 RX ORDER — CELECOXIB 200 MG/1
1 CAPSULE ORAL
Qty: 14 | Refills: 0
Start: 2022-12-29 | End: 2023-01-04

## 2022-12-29 RX ORDER — ASPIRIN/CALCIUM CARB/MAGNESIUM 324 MG
1 TABLET ORAL
Qty: 60 | Refills: 0
Start: 2022-12-29 | End: 2023-01-27

## 2022-12-29 RX ORDER — PANTOPRAZOLE SODIUM 20 MG/1
1 TABLET, DELAYED RELEASE ORAL
Qty: 30 | Refills: 0
Start: 2022-12-29 | End: 2023-01-27

## 2022-12-29 RX ORDER — ACETAMINOPHEN 500 MG
2 TABLET ORAL
Qty: 112 | Refills: 0
Start: 2022-12-29 | End: 2023-01-11

## 2022-12-29 RX ADMIN — Medication 150 MILLIGRAM(S): at 05:12

## 2022-12-29 RX ADMIN — Medication 15 MILLIGRAM(S): at 11:21

## 2022-12-29 RX ADMIN — Medication 650 MILLIGRAM(S): at 05:57

## 2022-12-29 RX ADMIN — ZOLPIDEM TARTRATE 5 MILLIGRAM(S): 10 TABLET ORAL at 00:07

## 2022-12-29 RX ADMIN — Medication 650 MILLIGRAM(S): at 11:21

## 2022-12-29 RX ADMIN — Medication 100 MILLIGRAM(S): at 05:11

## 2022-12-29 RX ADMIN — Medication 15 MILLIGRAM(S): at 00:03

## 2022-12-29 RX ADMIN — Medication 15 MILLIGRAM(S): at 05:57

## 2022-12-29 RX ADMIN — OXYCODONE HYDROCHLORIDE 10 MILLIGRAM(S): 5 TABLET ORAL at 10:28

## 2022-12-29 RX ADMIN — Medication 650 MILLIGRAM(S): at 00:34

## 2022-12-29 RX ADMIN — OXYCODONE HYDROCHLORIDE 10 MILLIGRAM(S): 5 TABLET ORAL at 04:38

## 2022-12-29 RX ADMIN — Medication 650 MILLIGRAM(S): at 00:04

## 2022-12-29 RX ADMIN — PANTOPRAZOLE SODIUM 40 MILLIGRAM(S): 20 TABLET, DELAYED RELEASE ORAL at 05:11

## 2022-12-29 RX ADMIN — Medication 15 MILLIGRAM(S): at 05:10

## 2022-12-29 RX ADMIN — OXYCODONE HYDROCHLORIDE 10 MILLIGRAM(S): 5 TABLET ORAL at 09:58

## 2022-12-29 RX ADMIN — Medication 650 MILLIGRAM(S): at 05:11

## 2022-12-29 RX ADMIN — Medication 650 MILLIGRAM(S): at 11:50

## 2022-12-29 RX ADMIN — Medication 81 MILLIGRAM(S): at 05:12

## 2022-12-29 RX ADMIN — OXYCODONE HYDROCHLORIDE 10 MILLIGRAM(S): 5 TABLET ORAL at 04:06

## 2022-12-29 RX ADMIN — Medication 15 MILLIGRAM(S): at 00:35

## 2022-12-29 RX ADMIN — Medication 15 MILLIGRAM(S): at 11:36

## 2022-12-29 NOTE — OCCUPATIONAL THERAPY INITIAL EVALUATION ADULT - NSOTDMEREC_GEN_A_CORE
Pt owns RW, shower chair and grab bars in shower. 3:1 commode delivered to home./rolling walker/dressing/bathing/toileting

## 2022-12-29 NOTE — CONSULT NOTE ADULT - ASSESSMENT
# R. ARMIDA  #Fibromyalgia  #Back pain, chronic  #Restless leg syndrome  # h/o Cerebrovascular accident (CVA)  in 2016 and 2018 NO residual  # OA    Post op care per otho  Labs and vitals reviewed  Patient is medically stable for discharge from medicine perspective to follow up with her PMD in 1-2 weeks.     D/w ortho PA

## 2022-12-29 NOTE — DISCHARGE NOTE NURSING/CASE MANAGEMENT/SOCIAL WORK - PATIENT PORTAL LINK FT
You can access the FollowMyHealth Patient Portal offered by [facility] by registering at the following website: [email or web address]. By joining [employer]’s FollowMyHealth portal, you will also be able to view your health information using other applications (apps) compatible with our system.

## 2022-12-29 NOTE — OCCUPATIONAL THERAPY INITIAL EVALUATION ADULT - TRANSFER SAFETY CONCERNS NOTED: SHOWER, REHAB EVAL
As discussed with pt, pt to have her  present during shower transfer to increase safety. Pt demonstrated good understanding and in agreement./decreased weight-shifting ability

## 2022-12-29 NOTE — DISCHARGE NOTE NURSING/CASE MANAGEMENT/SOCIAL WORK - NSDCPEFALRISK_GEN_ALL_CORE
For information on Fall & Injury Prevention, visit: [email or web address] OR  [email or web address] OR  https://www.cdc.gov/steadi/patient.html

## 2022-12-29 NOTE — OCCUPATIONAL THERAPY INITIAL EVALUATION ADULT - ADDITIONAL COMMENTS
PLOF obtained from pt. Pt owns RW, shower chair and grab bars in shower. 3:1 commode delivered to home.  (-) driving

## 2022-12-29 NOTE — OCCUPATIONAL THERAPY INITIAL EVALUATION ADULT - GENERAL OBSERVATIONS, REHAB EVAL
10:47-11:05; chart reviewed, ok to treat by Occupational Therapist as confirmed by HUGO Lira, Pt received semifowler +aquacel right hip in NAD. Pt reported 6/10 right hip pain; HUGO Lira aware. Pt in agreement with OT IE.

## 2022-12-29 NOTE — OCCUPATIONAL THERAPY INITIAL EVALUATION ADULT - PERTINENT HX OF CURRENT PROBLEM, REHAB EVAL
Admitted for right hip OA; s/p right total hip arthroplasty POD#1; regional anesthesia; direct anterior approach

## 2022-12-29 NOTE — CONSULT NOTE ADULT - CONSULT REASON
Patient Education     Foot Fracture  You have a broken bone (fracture) in your foot. This will cause pain, swelling, and often bruising. It will usually take about 4 to 8 weeks to heal. A foot fracture may be treated with a special shoe, splint, cast, or boot.  Home care  Follow these guidelines when caring for yourself at home:  · You may be given a splint, cast, shoe, or boot to keep the injured area from moving. Unless you were told otherwise, use crutches or a walker. Don’t put weight on the injured foot until your health care provider says you can do so. (You can rent crutches and a walker at many pharmacies and surgical or orthopedic supply stores.) Don’t put weight on a splint, or it will break.  · Keep your leg elevated to reduce pain and swelling. When sleeping, put a pillow under the injured leg. When sitting, support the injured leg so it is above your waist. This is very important during the first 2 days (48 hours).  · Put an ice pack on the injured area. Do this for 20 minutes every 1 to 2 hours the first day for pain relief. You can make an ice pack by wrapping a plastic bag of ice cubes in a thin towel. As the ice melts, be careful that the splint, cast, boot, or shoe doesn’t get wet. You can place the ice pack directly over the splint or cast. Unless told otherwise, you can open the boot or shoe to apply the ice pack. Continue using the ice pack 3 to 4 times a day for the next 2 days. Then use the ice pack as needed to ease pain and swelling.  · Keep the splint, cast, boot, or shoe dry. When bathing, protect it with a large plastic bag, rubber-banded at the top end. If a fiberglass splint or cast or boot gets wet, you can dry it with a hair dryer. Unless told otherwise, you can take off the boot or shoe to bathe.  · You may use acetaminophen or ibuprofen to control pain, unless another pain medicine was prescribed. If you have chronic liver or kidney disease, talk with your healthcare provider before  using these medicines. Also talk with your provider if you’ve had a stomach ulcer or gastrointestinal bleeding.  · Don’t put creams or objects under the cast if you have itching.  Follow-up care  Follow up with your healthcare provider, or as advised. This is to make sure the bone is healing the way it should. If you were given a splint, it may be changed to a cast or boot at your follow-up visit.  X-rays may be taken. You will be told of any new findings that may affect your care.  When to seek medical advice  Call your healthcare provider right away if any of these occur:  · The cast or splint cracks  · The plaster cast or splint becomes wet or soft  · The fiberglass cast or splint stays wet for more than 24 hours  · Bad odor from the cast or wound fluid stains the cast  · Tightness or pain under the cast or splint gets worse  · Toes become swollen, cold, blue, numb, or tingly  · You can’t move your toes  · Skin around cast or splint becomes red  · Fever of 100.4ºF (38ºC) or higher, or as directed by your healthcare provider  Date Last Reviewed: 2/1/2017  © 7914-8130 [employer]. [address] [place] [address], [place] [address]. All rights reserved. This information is not intended as a substitute for professional medical care. Always follow your healthcare professional's instructions.            Medical management

## 2022-12-29 NOTE — PROGRESS NOTE ADULT - SUBJECTIVE AND OBJECTIVE BOX
ORTHOPEDIC SURGERY PROGRESS NOTE:    EDDIE AHUJA  [id number]    57y Female s/p right karely POD#1 . Patient seen and examined this morning at bedside, doing well, pain well controlled. No overnight events. Patient worked with PT post operatively. Denies any numbness/tingling in the extremities, denies CP/SOB/N/V/D. pt would like to go home today.         T(F): 98.3 (12-29-22 @ 04:44), Max: 98.3 (12-29-22 @ 04:44)  HR: 76 (12-29-22 @ 04:44) (55 - 81)  BP: 121/77 (12-29-22 @ 04:44) (97/60 - 154/70)  RR: 16 (12-29-22 @ 04:44) (15 - 18)  SpO2: 96% (12-29-22 @ 04:44) (96% - 100%)    PHYSICAL EXAM:   Patient laying in bed, in NAD, unlabored breathing on RA     RLE:  Aquacel Dressing in place c/d/i  SILT sp/dp/t/sural/saph  Firing ta/ehl/fhl/gs  compartments soft and compressible  Foot WWP, 2+ DP pulse      LABS:                        9.5    8.81  )-----------( 216      ( 29 Dec 2022 06:44 )             30.1           A/P: 57y Female s/p right karely POD # 1  -Weight Bearing Status: wbat  -Pain control  -DVT ppx  -Incentive spirometry  -PT/OT  -post op antibiotics  -AM labs   -Dispo planning: home with home PT   ORTHOPEDIC SURGERY PROGRESS NOTE:    EDDIE AHUJA  [id number]    57y Female s/p right karely POD#1 . Patient seen and examined this morning at bedside, doing well, pain well controlled. No overnight events. Patient worked with PT post operatively. Denies any numbness/tingling in the extremities, denies CP/SOB/N/V/D. pt would like to go home today.         T(F): 98.3 (12-29-22 @ 04:44), Max: 98.3 (12-29-22 @ 04:44)  HR: 76 (12-29-22 @ 04:44) (55 - 81)  BP: 121/77 (12-29-22 @ 04:44) (97/60 - 154/70)  RR: 16 (12-29-22 @ 04:44) (15 - 18)  SpO2: 96% (12-29-22 @ 04:44) (96% - 100%)    PHYSICAL EXAM:   Patient laying in bed, in NAD, unlabored breathing on RA     RLE:  Aquacel Dressing in place c/d/i  SILT sp/dp/t/sural/saph  Firing ta/ehl/fhl/gs  compartments soft and compressible  Foot WWP, 2+ DP pulse      LABS:                        9.5    8.81  )-----------( 216      ( 29 Dec 2022 06:44 )             30.1     12-29    139  |  107  |  9<L>  ----------------------------<  90  4.4   |  22  |  0.8    Ca    8.5      29 Dec 2022 06:44            A/P: 57y Female s/p right karely POD # 1  -Weight Bearing Status: wbat  -Pain control  -DVT ppx  -Incentive spirometry  -PT/OT  -post op antibiotics  -AM labs   -Dispo planning: home with home PT

## 2022-12-29 NOTE — CONSULT NOTE ADULT - SUBJECTIVE AND OBJECTIVE BOX
EDDIE AHUJA  57y, Female  Allergy: No Known Allergies      OVERNIGHT EVENTS:  57y Female with multiple medical problems as documented below,  s/p R. ARMIDA POD#1 . Patient seen and examined this morning at bedside, doing well, pain well controlled. No overnight events. Patient worked with PT post operatively. Denies any numbness/tingling in the extremities, denies CP/SOB/N/V/D. Participating in PT when seen.      REVIEW OF SYSTEMS:    CONSTITUTIONAL: No weakness, fevers or chills  EYES/ENT: No visual changes;  No vertigo or throat pain   NECK: No pain or stiffness  RESPIRATORY: No cough, wheezing, hemoptysis; No shortness of breath  CARDIOVASCULAR: No chest pain or palpitations  GASTROINTESTINAL: No abdominal or epigastric pain. No nausea, vomiting, or hematemesis; No diarrhea or constipation. No melena or hematochezia.  GENITOURINARY: No dysuria, frequency or hematuria  NEUROLOGICAL: No numbness or weakness  SKIN: No itching, rashes        PAST MEDICAL & SURGICAL HISTORY:  Fibromyalgia  Lyme disease  Back pain, chronic  Polysubstance dependence  Restless leg syndrome  Right hip pain  H/O fracture of rib  10+ yeras ago  Former smoker  Cerebrovascular accident (CVA)  in 2016 and 2018 NO residual  History of appendectomy  History of  section  Fracture, radius  History of gastric surgery      VITALS:  T(F): 98.6 (22 @ 08:13), Max: 98.6 (22 @ 08:13)  HR: 84 (22 @ 08:13)  BP: 126/79 (22 @ 08:13) (97/60 - 154/70)  RR: 16 (22 @ 08:13)  SpO2: 100% (22 @ 08:13)    General: WN/WD NAD  Neurology: A&Ox3, nonfocal, JAMES x 4  Eyes: PERRLA/ EOMI, Gross vision intact  ENT/Neck: Neck supple, trachea midline, No JVD, Gross hearing intact  Respiratory: CTA B/L, No wheezing, rales, rhonchi  CV: RRR, S1S2, no murmurs, rubs or gallops  Abdominal: Soft, NT, ND +BS,   Extremities: No edema, + peripheral pulses  Skin: No Rashes, Hematoma, Ecchymosis      TESTS & MEASUREMENTS:  Height (cm): 208.3 (22 @ 20:50)  Weight (kg): 70.3 (22 @ 20:50)  BMI (kg/m2): 16.2 (22 @ 20:50)    22 @ 07:01  -  22 @ 07:00  --------------------------------------------------------  IN: 0 mL / OUT: 2600 mL / NET: -2600 mL                            9.5    8.81  )-----------( 216      ( 29 Dec 2022 06:44 )             30.1           139  |  107  |  9<L>  ----------------------------<  90  4.4   |  22  |  0.8    Ca    8.5      29 Dec 2022 06:44      RADIOLOGY & ADDITIONAL TESTS:    MEDICATIONS:  MEDICATIONS  (STANDING):  acetaminophen     Tablet .. 650 milliGRAM(s) Oral every 6 hours  aspirin enteric coated 81 milliGRAM(s) Oral two times a day  celecoxib 200 milliGRAM(s) Oral every 12 hours  chlorhexidine 2% Cloths 1 Application(s) Topical <User Schedule>  ketorolac   Injectable 15 milliGRAM(s) IV Push every 6 hours  multivitamin 1 Tablet(s) Oral daily  pantoprazole    Tablet 40 milliGRAM(s) Oral before breakfast  polyethylene glycol 3350 17 Gram(s) Oral at bedtime  pregabalin 150 milliGRAM(s) Oral two times a day  rOPINIRole 0.25 milliGRAM(s) Oral at bedtime  senna 2 Tablet(s) Oral at bedtime  sodium chloride 0.9%. 1000 milliLiter(s) (100 mL/Hr) IV Continuous <Continuous>    MEDICATIONS  (PRN):  aluminum hydroxide/magnesium hydroxide/simethicone Suspension 30 milliLiter(s) Oral four times a day PRN Indigestion  magnesium hydroxide Suspension 30 milliLiter(s) Oral daily PRN Constipation  ondansetron Injectable 4 milliGRAM(s) IV Push every 6 hours PRN Nausea and/or Vomiting  oxyCODONE    IR 10 milliGRAM(s) Oral every 4 hours PRN breakthrough  traMADol 50 milliGRAM(s) Oral every 4 hours PRN Mild Pain (1 - 3)  zolpidem 5 milliGRAM(s) Oral at bedtime PRN Insomnia  zolpidem 5 milliGRAM(s) Oral at bedtime PRN Insomnia      HEALTH ISSUES - PROBLEM Dx:  OA (osteoarthritis) of hip            Case discussed in details with:

## 2022-12-29 NOTE — OCCUPATIONAL THERAPY INITIAL EVALUATION ADULT - LIVES WITH, PROFILE
with wife in a private home +5 steps to enter with no handrail +14 steps inside with handrail +walk-in-shower with grab bars +shower chair/spouse

## 2023-01-04 LAB — SURGICAL PATHOLOGY STUDY: SIGNIFICANT CHANGE UP

## 2023-01-05 DIAGNOSIS — M79.7 FIBROMYALGIA: ICD-10-CM

## 2023-01-05 DIAGNOSIS — G25.81 RESTLESS LEGS SYNDROME: ICD-10-CM

## 2023-01-05 DIAGNOSIS — Z87.891 PERSONAL HISTORY OF NICOTINE DEPENDENCE: ICD-10-CM

## 2023-01-05 DIAGNOSIS — G89.29 OTHER CHRONIC PAIN: ICD-10-CM

## 2023-01-05 DIAGNOSIS — M54.9 DORSALGIA, UNSPECIFIED: ICD-10-CM

## 2023-01-05 DIAGNOSIS — M16.11 UNILATERAL PRIMARY OSTEOARTHRITIS, RIGHT HIP: ICD-10-CM

## 2023-01-05 DIAGNOSIS — F19.21 OTHER PSYCHOACTIVE SUBSTANCE DEPENDENCE, IN REMISSION: ICD-10-CM

## 2023-01-05 DIAGNOSIS — Z86.73 PERSONAL HISTORY OF TRANSIENT ISCHEMIC ATTACK (TIA), AND CEREBRAL INFARCTION WITHOUT RESIDUAL DEFICITS: ICD-10-CM

## 2023-01-06 ENCOUNTER — APPOINTMENT (OUTPATIENT)
Dept: ORTHOPEDIC SURGERY | Facility: CLINIC | Age: 58
End: 2023-01-06

## 2023-01-11 ENCOUNTER — NON-APPOINTMENT (OUTPATIENT)
Age: 58
End: 2023-01-11

## 2023-01-19 ENCOUNTER — APPOINTMENT (OUTPATIENT)
Dept: ORTHOPEDIC SURGERY | Facility: CLINIC | Age: 58
End: 2023-01-19
Payer: MEDICAID

## 2023-01-19 PROCEDURE — 99024 POSTOP FOLLOW-UP VISIT: CPT

## 2023-01-19 PROCEDURE — 73502 X-RAY EXAM HIP UNI 2-3 VIEWS: CPT

## 2023-04-21 ENCOUNTER — INPATIENT (INPATIENT)
Facility: HOSPITAL | Age: 58
LOS: 1 days | Discharge: HOME | DRG: 812 | End: 2023-04-23
Attending: STUDENT IN AN ORGANIZED HEALTH CARE EDUCATION/TRAINING PROGRAM | Admitting: STUDENT IN AN ORGANIZED HEALTH CARE EDUCATION/TRAINING PROGRAM
Payer: MEDICAID

## 2023-04-21 VITALS
HEIGHT: 64 IN | DIASTOLIC BLOOD PRESSURE: 52 MMHG | SYSTOLIC BLOOD PRESSURE: 80 MMHG | WEIGHT: 145.06 LBS | RESPIRATION RATE: 12 BRPM | HEART RATE: 78 BPM | OXYGEN SATURATION: 97 %

## 2023-04-21 DIAGNOSIS — T40.711A POISONING BY CANNABIS, ACCIDENTAL (UNINTENTIONAL), INITIAL ENCOUNTER: ICD-10-CM

## 2023-04-21 DIAGNOSIS — S52.90XA UNSPECIFIED FRACTURE OF UNSPECIFIED FOREARM, INITIAL ENCOUNTER FOR CLOSED FRACTURE: Chronic | ICD-10-CM

## 2023-04-21 DIAGNOSIS — Z98.890 OTHER SPECIFIED POSTPROCEDURAL STATES: Chronic | ICD-10-CM

## 2023-04-21 DIAGNOSIS — Z98.891 HISTORY OF UTERINE SCAR FROM PREVIOUS SURGERY: Chronic | ICD-10-CM

## 2023-04-21 DIAGNOSIS — Z90.49 ACQUIRED ABSENCE OF OTHER SPECIFIED PARTS OF DIGESTIVE TRACT: Chronic | ICD-10-CM

## 2023-04-21 DIAGNOSIS — F12.90 CANNABIS USE, UNSPECIFIED, UNCOMPLICATED: ICD-10-CM

## 2023-04-21 LAB
ALBUMIN SERPL ELPH-MCNC: 3.7 G/DL — SIGNIFICANT CHANGE UP (ref 3.5–5.2)
ALP SERPL-CCNC: 140 U/L — HIGH (ref 30–115)
ALT FLD-CCNC: 7 U/L — SIGNIFICANT CHANGE UP (ref 0–41)
ANION GAP SERPL CALC-SCNC: 9 MMOL/L — SIGNIFICANT CHANGE UP (ref 7–14)
APAP SERPL-MCNC: <5 UG/ML — LOW (ref 10–30)
AST SERPL-CCNC: 12 U/L — SIGNIFICANT CHANGE UP (ref 0–41)
BASOPHILS # BLD AUTO: 0.06 K/UL — SIGNIFICANT CHANGE UP (ref 0–0.2)
BASOPHILS NFR BLD AUTO: 0.8 % — SIGNIFICANT CHANGE UP (ref 0–1)
BILIRUB SERPL-MCNC: <0.2 MG/DL — SIGNIFICANT CHANGE UP (ref 0.2–1.2)
BUN SERPL-MCNC: 15 MG/DL — SIGNIFICANT CHANGE UP (ref 10–20)
CALCIUM SERPL-MCNC: 8.5 MG/DL — SIGNIFICANT CHANGE UP (ref 8.4–10.5)
CHLORIDE SERPL-SCNC: 114 MMOL/L — HIGH (ref 98–110)
CO2 SERPL-SCNC: 20 MMOL/L — SIGNIFICANT CHANGE UP (ref 17–32)
CREAT SERPL-MCNC: 1.4 MG/DL — SIGNIFICANT CHANGE UP (ref 0.7–1.5)
EGFR: 43 ML/MIN/1.73M2 — LOW
EOSINOPHIL # BLD AUTO: 0.24 K/UL — SIGNIFICANT CHANGE UP (ref 0–0.7)
EOSINOPHIL NFR BLD AUTO: 3.3 % — SIGNIFICANT CHANGE UP (ref 0–8)
ETHANOL SERPL-MCNC: <10 MG/DL — SIGNIFICANT CHANGE UP
GAS PNL BLDV: SIGNIFICANT CHANGE UP
GLUCOSE SERPL-MCNC: 109 MG/DL — HIGH (ref 70–99)
HCT VFR BLD CALC: 29.3 % — LOW (ref 37–47)
HGB BLD-MCNC: 8.8 G/DL — LOW (ref 12–16)
IMM GRANULOCYTES NFR BLD AUTO: 0.3 % — SIGNIFICANT CHANGE UP (ref 0.1–0.3)
LYMPHOCYTES # BLD AUTO: 0.97 K/UL — LOW (ref 1.2–3.4)
LYMPHOCYTES # BLD AUTO: 13.3 % — LOW (ref 20.5–51.1)
MCHC RBC-ENTMCNC: 23.1 PG — LOW (ref 27–31)
MCHC RBC-ENTMCNC: 30 G/DL — LOW (ref 32–37)
MCV RBC AUTO: 76.9 FL — LOW (ref 81–99)
MONOCYTES # BLD AUTO: 0.54 K/UL — SIGNIFICANT CHANGE UP (ref 0.1–0.6)
MONOCYTES NFR BLD AUTO: 7.4 % — SIGNIFICANT CHANGE UP (ref 1.7–9.3)
NEUTROPHILS # BLD AUTO: 5.45 K/UL — SIGNIFICANT CHANGE UP (ref 1.4–6.5)
NEUTROPHILS NFR BLD AUTO: 74.9 % — SIGNIFICANT CHANGE UP (ref 42.2–75.2)
NRBC # BLD: 0 /100 WBCS — SIGNIFICANT CHANGE UP (ref 0–0)
PLATELET # BLD AUTO: 248 K/UL — SIGNIFICANT CHANGE UP (ref 130–400)
PMV BLD: 9.8 FL — SIGNIFICANT CHANGE UP (ref 7.4–10.4)
POTASSIUM SERPL-MCNC: 4.2 MMOL/L — SIGNIFICANT CHANGE UP (ref 3.5–5)
POTASSIUM SERPL-SCNC: 4.2 MMOL/L — SIGNIFICANT CHANGE UP (ref 3.5–5)
PROT SERPL-MCNC: 6.9 G/DL — SIGNIFICANT CHANGE UP (ref 6–8)
RBC # BLD: 3.81 M/UL — LOW (ref 4.2–5.4)
RBC # FLD: 16.4 % — HIGH (ref 11.5–14.5)
SALICYLATES SERPL-MCNC: <0.3 MG/DL — LOW (ref 4–30)
SODIUM SERPL-SCNC: 143 MMOL/L — SIGNIFICANT CHANGE UP (ref 135–146)
WBC # BLD: 7.28 K/UL — SIGNIFICANT CHANGE UP (ref 4.8–10.8)
WBC # FLD AUTO: 7.28 K/UL — SIGNIFICANT CHANGE UP (ref 4.8–10.8)

## 2023-04-21 PROCEDURE — 71260 CT THORAX DX C+: CPT | Mod: 26,MA

## 2023-04-21 PROCEDURE — 99291 CRITICAL CARE FIRST HOUR: CPT

## 2023-04-21 PROCEDURE — 72125 CT NECK SPINE W/O DYE: CPT | Mod: 26,MA

## 2023-04-21 PROCEDURE — 74177 CT ABD & PELVIS W/CONTRAST: CPT | Mod: 26,MA

## 2023-04-21 PROCEDURE — 71045 X-RAY EXAM CHEST 1 VIEW: CPT | Mod: 26

## 2023-04-21 PROCEDURE — 70450 CT HEAD/BRAIN W/O DYE: CPT | Mod: 26,MA

## 2023-04-21 RX ORDER — NALOXONE HYDROCHLORIDE 4 MG/.1ML
4 SPRAY NASAL ONCE
Refills: 0 | Status: COMPLETED | OUTPATIENT
Start: 2023-04-21 | End: 2023-04-21

## 2023-04-21 RX ORDER — SODIUM CHLORIDE 9 MG/ML
1000 INJECTION INTRAMUSCULAR; INTRAVENOUS; SUBCUTANEOUS ONCE
Refills: 0 | Status: COMPLETED | OUTPATIENT
Start: 2023-04-21 | End: 2023-04-21

## 2023-04-21 RX ADMIN — SODIUM CHLORIDE 1000 MILLILITER(S): 9 INJECTION INTRAMUSCULAR; INTRAVENOUS; SUBCUTANEOUS at 22:22

## 2023-04-21 RX ADMIN — NALOXONE HYDROCHLORIDE 4 MILLIGRAM(S): 4 SPRAY NASAL at 23:08

## 2023-04-21 RX ADMIN — NALOXONE HYDROCHLORIDE 4 MILLIGRAM(S): 4 SPRAY NASAL at 22:21

## 2023-04-21 NOTE — ED ADULT TRIAGE NOTE - CHIEF COMPLAINT QUOTE
Pt BIBA for ingestion of gummies on tip of regular home medications. Pt BIBA for ingestion of gummies on top of regular home medications.

## 2023-04-22 DIAGNOSIS — J18.9 PNEUMONIA, UNSPECIFIED ORGANISM: ICD-10-CM

## 2023-04-22 PROBLEM — Z87.891 PERSONAL HISTORY OF NICOTINE DEPENDENCE: Chronic | Status: ACTIVE | Noted: 2022-11-29

## 2023-04-22 LAB
APPEARANCE UR: CLEAR — SIGNIFICANT CHANGE UP
BILIRUB UR-MCNC: NEGATIVE — SIGNIFICANT CHANGE UP
COLOR SPEC: YELLOW — SIGNIFICANT CHANGE UP
DIFF PNL FLD: NEGATIVE — SIGNIFICANT CHANGE UP
GLUCOSE UR QL: NEGATIVE MG/DL — SIGNIFICANT CHANGE UP
KETONES UR-MCNC: NEGATIVE — SIGNIFICANT CHANGE UP
LEUKOCYTE ESTERASE UR-ACNC: ABNORMAL
NITRITE UR-MCNC: POSITIVE
PH UR: 6 — SIGNIFICANT CHANGE UP (ref 5–8)
PROT UR-MCNC: NEGATIVE MG/DL — SIGNIFICANT CHANGE UP
SP GR SPEC: 1.02 — SIGNIFICANT CHANGE UP (ref 1.01–1.03)
UROBILINOGEN FLD QL: 0.2 MG/DL — SIGNIFICANT CHANGE UP

## 2023-04-22 PROCEDURE — 87186 SC STD MICRODIL/AGAR DIL: CPT

## 2023-04-22 PROCEDURE — 36415 COLL VENOUS BLD VENIPUNCTURE: CPT

## 2023-04-22 PROCEDURE — 93005 ELECTROCARDIOGRAM TRACING: CPT

## 2023-04-22 PROCEDURE — 87086 URINE CULTURE/COLONY COUNT: CPT

## 2023-04-22 PROCEDURE — 85025 COMPLETE CBC W/AUTO DIFF WBC: CPT

## 2023-04-22 PROCEDURE — 93010 ELECTROCARDIOGRAM REPORT: CPT

## 2023-04-22 PROCEDURE — 87899 AGENT NOS ASSAY W/OPTIC: CPT

## 2023-04-22 PROCEDURE — 87449 NOS EACH ORGANISM AG IA: CPT

## 2023-04-22 PROCEDURE — G0378: CPT

## 2023-04-22 PROCEDURE — 81001 URINALYSIS AUTO W/SCOPE: CPT

## 2023-04-22 PROCEDURE — 99222 1ST HOSP IP/OBS MODERATE 55: CPT

## 2023-04-22 PROCEDURE — 84145 PROCALCITONIN (PCT): CPT

## 2023-04-22 PROCEDURE — 80053 COMPREHEN METABOLIC PANEL: CPT

## 2023-04-22 PROCEDURE — 86803 HEPATITIS C AB TEST: CPT

## 2023-04-22 RX ORDER — SENNA PLUS 8.6 MG/1
2 TABLET ORAL AT BEDTIME
Refills: 0 | Status: DISCONTINUED | OUTPATIENT
Start: 2023-04-22 | End: 2023-04-23

## 2023-04-22 RX ORDER — AZITHROMYCIN 500 MG/1
500 TABLET, FILM COATED ORAL EVERY 24 HOURS
Refills: 0 | Status: DISCONTINUED | OUTPATIENT
Start: 2023-04-22 | End: 2023-04-22

## 2023-04-22 RX ORDER — PANTOPRAZOLE SODIUM 20 MG/1
40 TABLET, DELAYED RELEASE ORAL
Refills: 0 | Status: DISCONTINUED | OUTPATIENT
Start: 2023-04-22 | End: 2023-04-23

## 2023-04-22 RX ORDER — ENOXAPARIN SODIUM 100 MG/ML
40 INJECTION SUBCUTANEOUS EVERY 24 HOURS
Refills: 0 | Status: DISCONTINUED | OUTPATIENT
Start: 2023-04-22 | End: 2023-04-23

## 2023-04-22 RX ORDER — CEFTRIAXONE 500 MG/1
1000 INJECTION, POWDER, FOR SOLUTION INTRAMUSCULAR; INTRAVENOUS ONCE
Refills: 0 | Status: COMPLETED | OUTPATIENT
Start: 2023-04-22 | End: 2023-04-22

## 2023-04-22 RX ORDER — AZITHROMYCIN 500 MG/1
500 TABLET, FILM COATED ORAL ONCE
Refills: 0 | Status: COMPLETED | OUTPATIENT
Start: 2023-04-22 | End: 2023-04-22

## 2023-04-22 RX ORDER — ASPIRIN/CALCIUM CARB/MAGNESIUM 324 MG
81 TABLET ORAL DAILY
Refills: 0 | Status: DISCONTINUED | OUTPATIENT
Start: 2023-04-22 | End: 2023-04-23

## 2023-04-22 RX ORDER — SODIUM CHLORIDE 9 MG/ML
1000 INJECTION INTRAMUSCULAR; INTRAVENOUS; SUBCUTANEOUS
Refills: 0 | Status: DISCONTINUED | OUTPATIENT
Start: 2023-04-22 | End: 2023-04-23

## 2023-04-22 RX ORDER — MAGNESIUM SULFATE 500 MG/ML
2 VIAL (ML) INJECTION ONCE
Refills: 0 | Status: COMPLETED | OUTPATIENT
Start: 2023-04-22 | End: 2023-04-22

## 2023-04-22 RX ORDER — SODIUM CHLORIDE 9 MG/ML
1000 INJECTION INTRAMUSCULAR; INTRAVENOUS; SUBCUTANEOUS ONCE
Refills: 0 | Status: COMPLETED | OUTPATIENT
Start: 2023-04-22 | End: 2023-04-22

## 2023-04-22 RX ORDER — ONDANSETRON 8 MG/1
4 TABLET, FILM COATED ORAL EVERY 8 HOURS
Refills: 0 | Status: DISCONTINUED | OUTPATIENT
Start: 2023-04-22 | End: 2023-04-23

## 2023-04-22 RX ORDER — ACETAMINOPHEN 500 MG
650 TABLET ORAL EVERY 6 HOURS
Refills: 0 | Status: DISCONTINUED | OUTPATIENT
Start: 2023-04-22 | End: 2023-04-23

## 2023-04-22 RX ORDER — CEFTRIAXONE 500 MG/1
1000 INJECTION, POWDER, FOR SOLUTION INTRAMUSCULAR; INTRAVENOUS EVERY 24 HOURS
Refills: 0 | Status: DISCONTINUED | OUTPATIENT
Start: 2023-04-22 | End: 2023-04-23

## 2023-04-22 RX ORDER — ALBUTEROL 90 UG/1
2 AEROSOL, METERED ORAL EVERY 6 HOURS
Refills: 0 | Status: DISCONTINUED | OUTPATIENT
Start: 2023-04-22 | End: 2023-04-23

## 2023-04-22 RX ORDER — OXYCODONE HYDROCHLORIDE 5 MG/1
5 TABLET ORAL EVERY 8 HOURS
Refills: 0 | Status: DISCONTINUED | OUTPATIENT
Start: 2023-04-22 | End: 2023-04-23

## 2023-04-22 RX ADMIN — SODIUM CHLORIDE 75 MILLILITER(S): 9 INJECTION INTRAMUSCULAR; INTRAVENOUS; SUBCUTANEOUS at 11:45

## 2023-04-22 RX ADMIN — CEFTRIAXONE 100 MILLIGRAM(S): 500 INJECTION, POWDER, FOR SOLUTION INTRAMUSCULAR; INTRAVENOUS at 02:06

## 2023-04-22 RX ADMIN — Medication 81 MILLIGRAM(S): at 11:45

## 2023-04-22 RX ADMIN — SODIUM CHLORIDE 1000 MILLILITER(S): 9 INJECTION INTRAMUSCULAR; INTRAVENOUS; SUBCUTANEOUS at 06:23

## 2023-04-22 RX ADMIN — Medication 25 GRAM(S): at 00:26

## 2023-04-22 RX ADMIN — OXYCODONE HYDROCHLORIDE 5 MILLIGRAM(S): 5 TABLET ORAL at 22:45

## 2023-04-22 RX ADMIN — AZITHROMYCIN 255 MILLIGRAM(S): 500 TABLET, FILM COATED ORAL at 02:47

## 2023-04-22 RX ADMIN — OXYCODONE HYDROCHLORIDE 5 MILLIGRAM(S): 5 TABLET ORAL at 21:44

## 2023-04-22 RX ADMIN — ALBUTEROL 2 PUFF(S): 90 AEROSOL, METERED ORAL at 22:59

## 2023-04-22 RX ADMIN — ENOXAPARIN SODIUM 40 MILLIGRAM(S): 100 INJECTION SUBCUTANEOUS at 11:45

## 2023-04-22 NOTE — ED PROVIDER NOTE - CARE PLAN
Assessment and plan of treatment:	Plan - labs, tox labs, monitor, end tidal, reassess.   Principal Discharge DX:	Pneumonia  Assessment and plan of treatment:	Plan - labs, tox labs, monitor, end tidal, reassess.  Secondary Diagnosis:	Drug overdose   1

## 2023-04-22 NOTE — H&P ADULT - HISTORY OF PRESENT ILLNESS
58-year-old female past medical history Lyme disease, osteoporosis, depression, anxiety, chronic pain brought in by EMS for evaluation ingestion.  Upon arrival patient is lethargic and slow to answer questions.  Patient has been states that she took half a bag of weed Gummies, total 2000 mg in entire bag.  Also, states that she snorts her oxycodone),    Patient denying any other substance use.  Unable to obtain comprehensive review of systems secondary to altered mental status.    ER Found to have pneumonia. Abx given. Discussed with toxicology who recommends fluids and observation.     Pt with multiple co-ingestions. Given 4mg IN x 2, first dose with minimal improvement second dose patient more awake.   ?Rib fx on CXR -   pan CT scan, pt also w/ ingestion and unknown trauma. found to have :     Bilateral upper and lower lobe consolidative opacities with enlarged   mediastinal lymph nodes.  Subcutaneous collection within the anterior right proximal thigh   intramuscular hematoma.

## 2023-04-22 NOTE — H&P ADULT - ASSESSMENT
58-year-old female past medical history Lyme disease, osteoporosis, depression, anxiety, chronic pain brought in by EMS for evaluation ingestion.  Upon arrival patient is lethargic and slow to answer questions.  Patient has been states that she took half a bag of weed Gummies, total 2000 mg in entire bag.  Also, states that she snorts her oxycodone),    toxicology called by ER      58-year-old female past medical history Lyme disease, osteoporosis, depression, anxiety, chronic pain brought in by EMS for evaluation ingestion.  Upon arrival patient is lethargic and slow to answer questions.  Patient has been states that she took half a bag of weed Gummies, total 2000 mg in entire bag.  Also, states that she snorts her oxycodone    toxicology called by ER     # Lethargy due to ingestion of polysubstance   - IV F  - neuro checks    - ekg in am   - avoid all benzo / opiates   - UA and urine cx   - fall precautions   - addiction medicine cx    # PNA ? aspiration   - procal level   - strep / leg urine   - iv abx rocephin / zithro   - labs in am     # osteoporosis/ depression / anxiety / chronic pain   - hold all meds until alert       d/w dr smiley

## 2023-04-22 NOTE — ED ADULT NURSE NOTE - NSFALLRSKASSISTTYPE_ED_ALL_ED
Preceptor Attestation:  Patient's case reviewed and discussed with Vaishnavi Connell DO resident and I evaluated the patient. I agree with written assessment and plan of care.  Supervising Physician:  Julito Ly MD MD  [facility]   Standing/Walking/Toileting

## 2023-04-22 NOTE — CONSULT NOTE ADULT - ASSESSMENT
59 yo female with pmhx lyme disease, osteoperosis, presenting with lethargy s/p ingestion. Suggestive of acute THC edible ingestion. Patient currently still somnolent but  responsive to verbal stimuli. THC edible ingestion can cause variable CNS depression in patients. This patient appears to still be symptomatic from her ingestion. Less likely opioid toxidrome due to no bradypnea and no response to naloxone.     -Recommend continue supportive care, monitoring neurologic and hemodynamic status. Can give IVF as needed  -Once patient is clinically and hemodynamically stable/improved, can be cleared from toxicologic perspective. If patient is still persistently symptomatic, possible medical admission may be needed.    Isauro Guan MD  Toxicology Fellow   57 yo female with pmhx lyme disease, osteoperosis, presenting with lethargy s/p ingestion. Suggestive of acute THC edible ingestion. Patient currently still somnolent but  responsive to verbal stimuli. THC edible ingestion can cause variable CNS depression in patients. This patient appears to still be symptomatic from her ingestion. Less likely opioid toxidrome due to no bradypnea and no response to naloxone.     -Recommend continue supportive care, monitoring neurologic and hemodynamic status. Can give IVF as needed  -Once patient is clinically and hemodynamically stable/improved, can be cleared from toxicologic perspective. If patient is still persistently symptomatic, possible medical admission may be needed.    Isauro Guan MD  Toxicology Fellow    I personally discussed with ED team. I reviewed the med tox fellow’s note (as assigned above), and agree with the findings and plan except as documented in my note.    Edible THC ingestion with sedation and otherwise stable.  Medically stable from med tox standpoint.  Monitor until back to baseline.    --Please call with any further questions    Randi    [phone] (pager)

## 2023-04-22 NOTE — PHARMACOTHERAPY INTERVENTION NOTE - COMMENTS
Recommended to change azithromycin to doxycycline 100 mg IV q12h since the QTc from 4/21 was 515 ms.    [place], PharmD, BCIDP  Clinical Pharmacy Specialist, Infectious Diseases  Tele-Antimicrobial Stewardship Program (Tele-ASP)  Tele-ASP Phone: [phone]

## 2023-04-22 NOTE — PATIENT PROFILE ADULT - FALL HARM RISK - HARM RISK INTERVENTIONS
Assistance with ambulation/Assistance OOB with selected safe patient handling equipment/Communicate Risk of Fall with Harm to all staff/Discuss with provider need for PT consult/Monitor gait and stability/Reinforce activity limits and safety measures with patient and family/Tailored Fall Risk Interventions/Visual Cue: Yellow wristband and red socks/Bed in lowest position, wheels locked, appropriate side rails in place/Call bell, personal items and telephone in reach/Instruct patient to call for assistance before getting out of bed or chair/Non-slip footwear when patient is out of bed/[place] to call system/Physically safe environment - no spills, clutter or unnecessary equipment/Purposeful Proactive Rounding/Room/bathroom lighting operational, light cord in reach

## 2023-04-22 NOTE — H&P ADULT - NSHPPHYSICALEXAM_GEN_ALL_CORE
Vital Signs Last 24 Hrs  HR: 73 (22 Apr 2023 03:09) (70 - 80)  BP: 105/57 (22 Apr 2023 07:54) (78/51 - 148/56)  RR: 14 (22 Apr 2023 03:09) (12 - 14)  SpO2: 99% (22 Apr 2023 03:09) (96% - 100%)    Parameters below as of 22 Apr 2023 03:09  Patient On (Oxygen Delivery Method): room air    GENERAL: Well-nourished, Well-developed. NAD.  HEAD: No visible or palpable bumps or hematomas. No ecchymosis behind ears B/L.  Eyes: PERRLA, EOMI.  ENMT: MMM.   Neck: Supple. FROM  CVS: Normal S1,S2. No murmurs appreciated on auscultation   RESP: No use of accessory muscles. Chest rise symmetrical with good expansion. Lungs clear to auscultation B/L. No wheezing, rales, or rhonchi auscultated.  GI: Normal auscultation of bowel sounds in all 4 quadrants. Soft, Nontender, Nondistended. No guarding or rebound tenderness. No CVAT B/L.  Skin: Warm, Dry. No rashes or lesions. Good cap refill < 2 sec B/L.  EXT: Radial and pedal pulses present B/L. No calf tenderness or swelling B/L. No palpable cords. No pedal edema B/L.  Neuro: lethargic, arousable to painful stimuli. no focal deficit. able to move all extremities

## 2023-04-22 NOTE — H&P ADULT - NSHPLABSRESULTS_GEN_ALL_CORE
04-21    143  |  114<H>  |  15  ----------------------------<  109<H>  4.2   |  20  |  1.4    Ca    8.5      21 Apr 2023 22:21    TPro  6.9  /  Alb  3.7  /  TBili  <0.2  /  DBili  x   /  AST  12  /  ALT  7   /  AlkPhos  140<H>  04-21                            8.8    7.28  )-----------( 248      ( 21 Apr 2023 22:21 )             29.3    < from: CT Head No Cont (04.21.23 @ 23:39) >      IMPRESSION:    CT HEAD:  No evidence of acute intracranial hemorrhage or large territorial infarct.  Inferior aspects of the bilateral frontal and temporal lobes are excluded   from the field-of-view.    CT CERVICAL SPINE:  Mild motion degradation.  No evidence of a cervical spine fracture or subluxation.  Straightening of the cervical lordosis secondary to positioning or muscle   spasm.    < end of copied text >    < from: CT Chest w/ IV Cont (04.21.23 @ 23:43) >    IMPRESSION:    CHEST:    Bilateral upper and lower lobe consolidative opacities with enlarged   mediastinal lymph nodes. Findings can be seen with pneumonia.    ABDOMEN/PELVIS:    Subcutaneous collection within the anterior right proximal thigh   measuring approximately 3.1 x 3.4 cm. Findings may represent an   intramuscular hematoma. Recommend physical exam correlation for external   signs of trauma.    < end of copied text >

## 2023-04-22 NOTE — ED PROVIDER NOTE - PHYSICAL EXAMINATION
GENERAL: Well-nourished, Well-developed. NAD.  HEAD: No visible or palpable bumps or hematomas. No ecchymosis behind ears B/L.  Eyes: PERRLA, EOMI.  ENMT: MMM.   Neck: Supple. FROM  CVS: Normal S1,S2. No murmurs appreciated on auscultation   RESP: No use of accessory muscles. Chest rise symmetrical with good expansion. Lungs clear to auscultation B/L. No wheezing, rales, or rhonchi auscultated.  GI: Normal auscultation of bowel sounds in all 4 quadrants. Soft, Nontender, Nondistended. No guarding or rebound tenderness. No CVAT B/L.  Skin: Warm, Dry. No rashes or lesions. Good cap refill < 2 sec B/L.  EXT: Radial and pedal pulses present B/L. No calf tenderness or swelling B/L. No palpable cords. No pedal edema B/L.  Neuro: lethargic, arousable to painful stimuli. no focal deficit. able to move all extremities

## 2023-04-22 NOTE — ED ADULT NURSE REASSESSMENT NOTE - NS ED NURSE REASSESS COMMENT FT1
Pt's sister Ericka called and stated to please inform her if and when pt is d/c for . [phone]
Pt endorsed to Marly ARIAS, pending p[physician dispo. Pt asleep, easily aroused, with respirations even, easy and nonlabored.
Pt more awake after 2nd narcan; taken to CT via stretcher.

## 2023-04-22 NOTE — ED PROVIDER NOTE - OBJECTIVE STATEMENT
58-year-old female past medical history Lyme disease, osteoporosis, depression, anxiety, chronic pain brought in by EMS for evaluation ingestion.  Upon arrival patient is lethargic and slow to answer questions.  Patient has been states that she took half a bag of weed Gummies, total 2000 mg in entire bag.  Patient denying any other substance use.  Unable to obtain comprehensive review of systems secondary to altered mental status.

## 2023-04-22 NOTE — ED PROVIDER NOTE - CLINICAL SUMMARY MEDICAL DECISION MAKING FREE TEXT BOX
Patient presents after ingestion of weed gummies. labs, imaging done. Found to have pneumonia. Abx given. Discussed with toxicology who recommends fluids and observation. Patient continues to be sleep in the ED after multiple hours of observation. Will admit for continued monitoring and assessment.

## 2023-04-22 NOTE — ED PROVIDER NOTE - PROGRESS NOTE DETAILS
Authored by Germaine Gomez, DO: Pt with multiple co-ingestions. Given 4mg IN x 2, first dose with minimal improvement second dose patient more awake. ?Rib fx on CXR - proceeded with pan CT scan, pt also w/ ingestion and unknown trauma. NC: pt with bruise to R thigh in setting of recent R hip replacement in december Authored by Germaine Gomez, DO: Pt reassessed, still lethargic, will continue to monitor and reassess. Authored by Germaine Gomez DO: Pt signed out to Dr. Brambila pending reassessment Received signout from Dr. Gomez pending sobriety. Patient reassessed, resting comfortably. Sleepy but arousable. Will continue to monitor.

## 2023-04-22 NOTE — ED PROVIDER NOTE - ATTENDING CONTRIBUTION TO CARE
59 yo F PMHx lyme disease, anxiety presents to ED BIBEMS for eval of ingestion. Pt with AMS unable to cooperate or answer questions, states that she did not take anything tonight. Pts  states she took 1/2 bag of 2000mg bag of weed gummies, as well as her home meds (unsure how much she took tonight, states that she snorts her oxycodone), and Lyrica.     Vital Signs: I have reviewed the initial vital signs.  Constitutional: +lethargic , slow to answer questions, nods off during exam   Integumentary: No rash.  ENT: MMM, PERRLA 3mm b/l   NECK: Supple.   Cardiovascular: RRR, radial pulses 2/4 bilaterally.   Respiratory: CTA B/L   Gastrointestinal: Abdomen soft, non-tender, non-distended, no rebound tenderness or guarding, no CVAT.   Musculoskeletal: FROM, no LE edema, distal pulses intact.   Neurologic: Awake, lethargic, nods off, occasionally follows commands

## 2023-04-22 NOTE — ED ADULT NURSE NOTE - NSIMPLEMENTINTERV_GEN_ALL_ED
Implemented All Fall Risk Interventions:  [place] to call system. Call bell, personal items and telephone within reach. Instruct patient to call for assistance. Room bathroom lighting operational. Non-slip footwear when patient is off stretcher. Physically safe environment: no spills, clutter or unnecessary equipment. Stretcher in lowest position, wheels locked, appropriate side rails in place. Provide visual cue, wrist band, yellow gown, etc. Monitor gait and stability. Monitor for mental status changes and reorient to person, place, and time. Review medications for side effects contributing to fall risk. Reinforce activity limits and safety measures with patient and family.

## 2023-04-23 ENCOUNTER — TRANSCRIPTION ENCOUNTER (OUTPATIENT)
Age: 58
End: 2023-04-23

## 2023-04-23 VITALS
RESPIRATION RATE: 16 BRPM | TEMPERATURE: 98 F | DIASTOLIC BLOOD PRESSURE: 72 MMHG | SYSTOLIC BLOOD PRESSURE: 148 MMHG | HEART RATE: 67 BPM

## 2023-04-23 DIAGNOSIS — F19.10 OTHER PSYCHOACTIVE SUBSTANCE ABUSE, UNCOMPLICATED: ICD-10-CM

## 2023-04-23 LAB
ALBUMIN SERPL ELPH-MCNC: 3.6 G/DL — SIGNIFICANT CHANGE UP (ref 3.5–5.2)
ALP SERPL-CCNC: 146 U/L — HIGH (ref 30–115)
ALT FLD-CCNC: 24 U/L — SIGNIFICANT CHANGE UP (ref 0–41)
ANION GAP SERPL CALC-SCNC: 11 MMOL/L — SIGNIFICANT CHANGE UP (ref 7–14)
AST SERPL-CCNC: 28 U/L — SIGNIFICANT CHANGE UP (ref 0–41)
BASOPHILS # BLD AUTO: 0.02 K/UL — SIGNIFICANT CHANGE UP (ref 0–0.2)
BASOPHILS NFR BLD AUTO: 0.4 % — SIGNIFICANT CHANGE UP (ref 0–1)
BILIRUB SERPL-MCNC: <0.2 MG/DL — SIGNIFICANT CHANGE UP (ref 0.2–1.2)
BUN SERPL-MCNC: 9 MG/DL — LOW (ref 10–20)
CALCIUM SERPL-MCNC: 8.5 MG/DL — SIGNIFICANT CHANGE UP (ref 8.4–10.5)
CHLORIDE SERPL-SCNC: 114 MMOL/L — HIGH (ref 98–110)
CO2 SERPL-SCNC: 19 MMOL/L — SIGNIFICANT CHANGE UP (ref 17–32)
CREAT SERPL-MCNC: 0.7 MG/DL — SIGNIFICANT CHANGE UP (ref 0.7–1.5)
EGFR: 99 ML/MIN/1.73M2 — SIGNIFICANT CHANGE UP
EOSINOPHIL # BLD AUTO: 0.14 K/UL — SIGNIFICANT CHANGE UP (ref 0–0.7)
EOSINOPHIL NFR BLD AUTO: 2.9 % — SIGNIFICANT CHANGE UP (ref 0–8)
GLUCOSE SERPL-MCNC: 91 MG/DL — SIGNIFICANT CHANGE UP (ref 70–99)
HCT VFR BLD CALC: 28.8 % — LOW (ref 37–47)
HGB BLD-MCNC: 8.4 G/DL — LOW (ref 12–16)
IMM GRANULOCYTES NFR BLD AUTO: 0.2 % — SIGNIFICANT CHANGE UP (ref 0.1–0.3)
LYMPHOCYTES # BLD AUTO: 1.14 K/UL — LOW (ref 1.2–3.4)
LYMPHOCYTES # BLD AUTO: 23.6 % — SIGNIFICANT CHANGE UP (ref 20.5–51.1)
MCHC RBC-ENTMCNC: 22.4 PG — LOW (ref 27–31)
MCHC RBC-ENTMCNC: 29.2 G/DL — LOW (ref 32–37)
MCV RBC AUTO: 76.8 FL — LOW (ref 81–99)
MONOCYTES # BLD AUTO: 0.47 K/UL — SIGNIFICANT CHANGE UP (ref 0.1–0.6)
MONOCYTES NFR BLD AUTO: 9.7 % — HIGH (ref 1.7–9.3)
NEUTROPHILS # BLD AUTO: 3.06 K/UL — SIGNIFICANT CHANGE UP (ref 1.4–6.5)
NEUTROPHILS NFR BLD AUTO: 63.2 % — SIGNIFICANT CHANGE UP (ref 42.2–75.2)
NRBC # BLD: 0 /100 WBCS — SIGNIFICANT CHANGE UP (ref 0–0)
PLATELET # BLD AUTO: 210 K/UL — SIGNIFICANT CHANGE UP (ref 130–400)
PMV BLD: 10.1 FL — SIGNIFICANT CHANGE UP (ref 7.4–10.4)
POTASSIUM SERPL-MCNC: 4.4 MMOL/L — SIGNIFICANT CHANGE UP (ref 3.5–5)
POTASSIUM SERPL-SCNC: 4.4 MMOL/L — SIGNIFICANT CHANGE UP (ref 3.5–5)
PROCALCITONIN SERPL-MCNC: 0.05 NG/ML — SIGNIFICANT CHANGE UP (ref 0.02–0.1)
PROT SERPL-MCNC: 6.3 G/DL — SIGNIFICANT CHANGE UP (ref 6–8)
RBC # BLD: 3.75 M/UL — LOW (ref 4.2–5.4)
RBC # FLD: 16.4 % — HIGH (ref 11.5–14.5)
S PNEUM AG UR QL: NEGATIVE — SIGNIFICANT CHANGE UP
SODIUM SERPL-SCNC: 144 MMOL/L — SIGNIFICANT CHANGE UP (ref 135–146)
WBC # BLD: 4.84 K/UL — SIGNIFICANT CHANGE UP (ref 4.8–10.8)
WBC # FLD AUTO: 4.84 K/UL — SIGNIFICANT CHANGE UP (ref 4.8–10.8)

## 2023-04-23 PROCEDURE — 99239 HOSP IP/OBS DSCHRG MGMT >30: CPT

## 2023-04-23 PROCEDURE — ZZZZZ: CPT

## 2023-04-23 PROCEDURE — 99221 1ST HOSP IP/OBS SF/LOW 40: CPT

## 2023-04-23 RX ORDER — ZOLPIDEM TARTRATE 10 MG/1
5 TABLET ORAL AT BEDTIME
Refills: 0 | Status: DISCONTINUED | OUTPATIENT
Start: 2023-04-23 | End: 2023-04-23

## 2023-04-23 RX ORDER — CEFPODOXIME PROXETIL 100 MG
1 TABLET ORAL
Qty: 12 | Refills: 0
Start: 2023-04-23 | End: 2023-04-28

## 2023-04-23 RX ADMIN — ZOLPIDEM TARTRATE 5 MILLIGRAM(S): 10 TABLET ORAL at 00:23

## 2023-04-23 RX ADMIN — OXYCODONE HYDROCHLORIDE 5 MILLIGRAM(S): 5 TABLET ORAL at 06:50

## 2023-04-23 RX ADMIN — Medication 81 MILLIGRAM(S): at 11:42

## 2023-04-23 RX ADMIN — SODIUM CHLORIDE 75 MILLILITER(S): 9 INJECTION INTRAMUSCULAR; INTRAVENOUS; SUBCUTANEOUS at 05:15

## 2023-04-23 RX ADMIN — OXYCODONE HYDROCHLORIDE 5 MILLIGRAM(S): 5 TABLET ORAL at 07:23

## 2023-04-23 RX ADMIN — PANTOPRAZOLE SODIUM 40 MILLIGRAM(S): 20 TABLET, DELAYED RELEASE ORAL at 07:39

## 2023-04-23 RX ADMIN — OXYCODONE HYDROCHLORIDE 5 MILLIGRAM(S): 5 TABLET ORAL at 16:12

## 2023-04-23 RX ADMIN — ENOXAPARIN SODIUM 40 MILLIGRAM(S): 100 INJECTION SUBCUTANEOUS at 11:42

## 2023-04-23 RX ADMIN — CEFTRIAXONE 100 MILLIGRAM(S): 500 INJECTION, POWDER, FOR SOLUTION INTRAMUSCULAR; INTRAVENOUS at 01:36

## 2023-04-23 RX ADMIN — ALBUTEROL 2 PUFF(S): 90 AEROSOL, METERED ORAL at 10:43

## 2023-04-23 RX ADMIN — Medication 100 MILLIGRAM(S): at 00:02

## 2023-04-23 RX ADMIN — Medication 100 MILLIGRAM(S): at 10:24

## 2023-04-23 RX ADMIN — OXYCODONE HYDROCHLORIDE 5 MILLIGRAM(S): 5 TABLET ORAL at 16:11

## 2023-04-23 RX ADMIN — ALBUTEROL 2 PUFF(S): 90 AEROSOL, METERED ORAL at 05:40

## 2023-04-23 NOTE — DISCHARGE NOTE NURSING/CASE MANAGEMENT/SOCIAL WORK - NSDCPEFALRISK_GEN_ALL_CORE
For information on Fall & Injury Prevention, visit: [email or web address] OR  [email or web address] OR  https://www.cdc.gov/steadi/patient.html

## 2023-04-23 NOTE — DISCHARGE NOTE PROVIDER - NSDCMRMEDTOKEN_GEN_ALL_CORE_FT
acetaminophen 325 mg oral tablet: 2 tab(s) orally every 6 hours MDD:8  Ambien 10 mg oral tablet: 1 tab(s) orally once a day (at bedtime)  aspirin 81 mg oral delayed release tablet: 1 tab(s) orally 2 times a day MDD:2  Benadryl:   cefpodoxime 200 mg oral tablet: 1 tab(s) orally 2 times a day  celecoxib 200 mg oral capsule: 1 cap(s) orally every 12 hours MDD:2  diazePAM 10 mg oral tablet: 1 tab(s) orally prn, As Needed  docusate sodium 100 mg oral capsule: 1 cap(s) orally 3 times a day  doxycycline monohydrate 100 mg oral tablet: 1 tab(s) orally 2 times a day  Fioricet oral capsule: 1 cap(s) orally every 4 hours  Lyrica 150 mg oral capsule: 1 cap(s) orally 2 times a day  oxyCODONE 10 mg oral tablet: 1 tab(s) orally every 6 hours, As Needed  pantoprazole 40 mg oral delayed release tablet: 1 tab(s) orally once a day (before a meal) MDD:1  rOPINIRole 0.25 mg oral tablet: 1 tab(s) orally once a day (at bedtime)  senna oral tablet: 2 tab(s) orally once a day (at bedtime), As needed, Constipation  traMADol 50 mg oral tablet: 1 tab(s) orally every 6 hours, As Needed -Mild Pain (1 - 3) MDD:4  Zantac 150 oral tablet: 1 tab(s) orally 2 times a day

## 2023-04-23 NOTE — CONSULT NOTE ADULT - PROBLEM SELECTOR RECOMMENDATION 9
After evaluation at this time no need for protocol. There is no medical treatment for her ?1 x use of marijuana. Pt denies any abuse of oxycodone. Pt was given extensive eduction on risks of crushing and snorting her oxycodone. Pt continued to deny any issues with opiates. Pt will be monitored and supportive care provided.  Use of Vistaril for anxiety.  Consider adding gabapentin for anxiety standing order and follow up with PMD/Program for continuation of treatment.     counseling provided   CATCH team involved for aftercare and pt will follow up with aftercare if patient wants help was given phone number [phone] and can call tomorrow. Pt understood.

## 2023-04-23 NOTE — CONSULT NOTE ADULT - SUBJECTIVE AND OBJECTIVE BOX
MEDICAL TOXICOLOGY CONSULT    HPI:  57 yo female with pmhx lyme disease, osteoperosis, presenting with lethargy s/p ingestion. Patient endorses ingesting half bag of 2000mg THC edible gummies. Denies any other co-ingestants or anyother symptoms.    HR 73 /57 RR 14 O2 99% RA  EKG HR 75 QRS 82     ONSET / TIME of exposure(s): unknown    QUANTITY of exposure(s): 1000mg thc edble    ROUTE of exposure:  __ingestion_ (INGESTION, DERMAL, INHALATION, or UNKNOWN)    CONTEXT of exposure: _home__ (at home, found down on street, found wandering by EMS)    ASSOCIATED symptoms: somnolence    PAST MEDICAL & SURGICAL HISTORY:  Fibromyalgia      Lyme disease      Back pain, chronic      Polysubstance dependence      Restless leg syndrome      Right hip pain      H/O fracture of rib  10+ yeras ago      Former smoker      Cerebrovascular accident (CVA)  in 2016 and 2018 NO residual      History of appendectomy      History of  section      Fracture, radius      History of gastric surgery          MEDICATION HISTORY:      FAMILY HISTORY:  No pertinent family history in first degree relatives        REVIEW OF SYSTEMS:   __X___unable to perform due to intoxication, dementia, or illness      Vital Signs Last 24 Hrs  T(C): --  T(F): --  HR: 73 (2023 03:09) (70 - 80)  BP: 105/57 (2023 07:54) (78/51 - 148/56)  BP(mean): --  RR: 14 (2023 03:09) (12 - 14)  SpO2: 99% (2023 03:09) (96% - 100%)    Parameters below as of 2023 03:09  Patient On (Oxygen Delivery Method): room air        SIGNIFICANT LABORATORY STUDIES:                        8.8    7.28  )-----------( 248      ( 2023 22:21 )             29.3       04-21    143  |  114<H>  |  15  ----------------------------<  109<H>  4.2   |  20  |  1.4    Ca    8.5      2023 22:21    TPro  6.9  /  Alb  3.7  /  TBili  <0.2  /  DBili  x   /  AST  12  /  ALT  7   /  AlkPhos  140<H>  04-    
    Pt interviewed, examined and EMR chart reviewed.  Pt admits to taking gummies for the first time and took too many and became obtunded. Pt vehemently denies doing any other drugs. Pt denies snorting her oxycodone. Pt states compliant with her physician for her prescriptions. Pt with extensive hx of back pain and surgery.  Pt requesting to discharged today  Denies any Hx of withdrawal   variable periods of sobriety in the past.  Has been in detox before __X___yes,   _____No    Istopped checked matches pt's history  2023	04/10/2023	oxycodone hcl (ir) 10 mg tab	84	28	Gerardo Wang	[id number]	[facility]  2023	04/10/2023	pregabalin 100 mg capsule	56	28	Gerardo Wang	[id number]	[facility]          REVIEW OF SYSTEMS:    Constitutional: No fever, weight loss or fatigue  ENT:  No difficulty hearing, tinnitus, vertigo; No sinus or throat pain  Neck: No pain or stiffness  Respiratory: No cough, wheezing, chills or hemoptysis  Cardiovascular: No chest pain, palpitations, shortness of breath, dizziness or leg swelling  Gastrointestinal: No abdominal or epigastric pain. No nausea, vomiting or hematemesis; No diarrhea or constipation. No melena or hematochezia.  Neurological: No headaches, memory loss, loss of strength, numbness or tremors  Musculoskeletal: No joint pain or swelling; No muscle, back or extremity pain  Psychiatric: No depression, anxiety, mood swings or difficulty sleeping    MEDICATIONS  (STANDING):  aspirin enteric coated 81 milliGRAM(s) Oral daily  cefTRIAXone   IVPB 1000 milliGRAM(s) IV Intermittent every 24 hours  doxycycline IVPB 100 milliGRAM(s) IV Intermittent every 12 hours  enoxaparin Injectable 40 milliGRAM(s) SubCutaneous every 24 hours  pantoprazole    Tablet 40 milliGRAM(s) Oral before breakfast  sodium chloride 0.9%. 1000 milliLiter(s) (75 mL/Hr) IV Continuous <Continuous>    MEDICATIONS  (PRN):  acetaminophen     Tablet .. 650 milliGRAM(s) Oral every 6 hours PRN Temp greater or equal to 38C (100.4F), Mild Pain (1 - 3)  albuterol    90 MICROgram(s) HFA Inhaler 2 Puff(s) Inhalation every 6 hours PRN Shortness of Breath and/or Wheezing  ondansetron Injectable 4 milliGRAM(s) IV Push every 8 hours PRN Nausea and/or Vomiting  oxyCODONE    IR 5 milliGRAM(s) Oral every 8 hours PRN Severe Pain (7 - 10)  senna 2 Tablet(s) Oral at bedtime PRN Constipation  zolpidem 5 milliGRAM(s) Oral at bedtime PRN Insomnia  zolpidem 5 milliGRAM(s) Oral at bedtime PRN Insomnia      Vital Signs Last 24 Hrs  T(C): 36.7 (2023 13:53), Max: 36.7 (2023 13:53)  T(F): 98 (2023 13:53), Max: 98 (2023 13:53)  HR: 67 (2023 13:53) (67 - 76)  BP: 148/72 (2023 13:53) (104/61 - 152/73)  BP(mean): --  RR: 16 (2023 13:53) (16 - 18)  SpO2: 97% (2023 05:00) (97% - 97%)        PHYSICAL EXAM:    Constitutional: NAD, well-groomed, well-developed  HEENT: PERRLA, EOMI, Normal Hearing, MMM  Neck: No LAD, No JVD  Back: Normal spine flexure, No CVA tenderness  Respiratory: CTAB/L  Cardiovascular: S1 and S2, RRR, no M/G/R  Gastrointestinal: BS+, soft, NT/ND  Extremities: No peripheral edema  Vascular: 2+ peripheral pulses  Neurological: A/O x 3, no focal deficits    LABS:                        8.4    4.84  )-----------( 210      ( 2023 07:13 )             28.8         144  |  114<H>  |  9<L>  ----------------------------<  91  4.4   |  19  |  0.7    Ca    8.5      2023 07:13    TPro  6.3  /  Alb  3.6  /  TBili  <0.2  /  DBili  x   /  AST  28  /  ALT  24  /  AlkPhos  146<H>        Urinalysis Basic - ( 2023 14:01 )    Color: Yellow / Appearance: Clear / S.020 / pH: x  Gluc: x / Ketone: Negative  / Bili: Negative / Urobili: 0.2 mg/dL   Blood: x / Protein: Negative mg/dL / Nitrite: Positive   Leuk Esterase: Small / RBC: 1-2 /HPF / WBC 6-10 /HPF   Sq Epi: x / Non Sq Epi: x / Bacteria: Many      Drug Screen Urine:  Alcohol Level  Alcohol, Blood: <10 mg/dL (23 @ 22:21)        RADIOLOGY & ADDITIONAL STUDIES:

## 2023-04-23 NOTE — DISCHARGE NOTE NURSING/CASE MANAGEMENT/SOCIAL WORK - PATIENT PORTAL LINK FT
You can access the FollowMyHealth Patient Portal offered by [facility] by registering at the following website: [email or web address]. By joining [facility]’s FollowMyHealth portal, you will also be able to view your health information using other applications (apps) compatible with our system.

## 2023-04-23 NOTE — DISCHARGE NOTE PROVIDER - NSDCFUADDINST_GEN_ALL_CORE_FT
Things to follow up:  -PCP follow up in 1-2 weeks  -Take Vantin and Doxycycline for 6 more days  -Avoid using THC gummies

## 2023-04-23 NOTE — DISCHARGE NOTE PROVIDER - NSDCCPCAREPLAN_GEN_ALL_CORE_FT
PRINCIPAL DISCHARGE DIAGNOSIS  Diagnosis: Drug overdose  Assessment and Plan of Treatment: Patient was admitted for lethargy due to ingestion of polysubstance. Toxicology was consulted; Recommended continue supportive care, monitoring neurologic and hemodynamic status. Patient's mental status returned back to baseline. Patient was treated with IVF. Patient was also evaluated by addiction medicine. Patient was treated with possible pneumonia with antibiotics with improvement. At this time patient is medically stable for a hospital discharge.  Things to follow up:  -PCP follow up in 1-2 weeks  -Take Vantin and Doxycycline for 6 more days  -Avoid using THC gummies      SECONDARY DISCHARGE DIAGNOSES  Diagnosis: Drug overdose  Assessment and Plan of Treatment:

## 2023-04-23 NOTE — DISCHARGE NOTE PROVIDER - HOSPITAL COURSE
58-year-old female past medical history Lyme disease, osteoporosis, depression, anxiety, chronic pain brought in by EMS for evaluation ingestion.  Upon arrival patient is lethargic and slow to answer questions.  Patient has been states that she took half a bag of weed Gummies, total 2000 mg in entire bag.  Also, states that she snorts her oxycodone. Patient was admitted for lethargy due to ingestion of polysubstance. Toxicology was consulted; Recommended continue supportive care, monitoring neurologic and hemodynamic status. Patient's mental status returned back to baseline. Patient was treated with IVF. Patient was also evaluated by addiction medicine. Patient was treated with possible pneumonia with antibiotics with improvement. At this time patient is medically stable for a hospital discharge.    Things to follow up:  -PCP follow up in 1-2 weeks  -Take Vantin and Doxycycline for 6 more days  -Avoid using THC gummies     Patient was seen and examined at bedside; patient reports feeling well; reports she wants to go home today. Discharge planning was discussed.  Vital Signs Last 24 Hrs  T(C): 36.4 (23 Apr 2023 05:00), Max: 36.4 (23 Apr 2023 05:00)  T(F): 97.6 (23 Apr 2023 05:00), Max: 97.6 (23 Apr 2023 05:00)  HR: 76 (23 Apr 2023 05:00) (69 - 76)  BP: 152/73 (23 Apr 2023 05:00) (104/61 - 152/73)  BP(mean): --  RR: 18 (22 Apr 2023 20:40) (16 - 18)  SpO2: 97% (23 Apr 2023 05:00) (97% - 98%)    Parameters below as of 22 Apr 2023 13:11  Patient On (Oxygen Delivery Method): room air  GENERAL: Well-nourished, Well-developed. NAD.  HEAD: No visible or palpable bumps or hematomas. No ecchymosis behind ears B/L.  Eyes: PERRLA, EOMI.  ENMT: MMM.   Neck: Supple. FROM  CVS: Normal S1,S2. No murmurs appreciated on auscultation   RESP: No use of accessory muscles. Chest rise symmetrical with good expansion. Lungs clear to auscultation B/L. No wheezing, rales, or rhonchi auscultated.  GI: Normal auscultation of bowel sounds in all 4 quadrants. Soft, Nontender, Nondistended. No guarding or rebound tenderness. No CVAT B/L.  Skin: Warm, Dry. No rashes or lesions. Good cap refill < 2 sec B/L.  EXT: Radial and pedal pulses present B/L. No calf tenderness or swelling B/L. No palpable cords. No pedal edema B/L.  Neuro: lethargic, arousable to painful stimuli. no focal deficit. able to move all extremities     58-year-old female past medical history Lyme disease, osteoporosis, depression, anxiety, chronic pain brought in by EMS for evaluation ingestion.  Upon arrival patient is lethargic and slow to answer questions.  Patient has been states that she took half a bag of weed Gummies, total 2000 mg in entire bag.  Also, states that she snorts her oxycodone. Patient was admitted for lethargy due to ingestion of polysubstance. Toxicology was consulted; Recommended continue supportive care, monitoring neurologic and hemodynamic status. Patient's mental status returned back to baseline. Patient was treated with IVF. Patient was also evaluated by addiction medicine. Patient was treated with possible pneumonia with antibiotics with improvement. At this time patient is medically stable for a hospital discharge.    Things to follow up:  -PCP follow up in 1-2 weeks  -Take Vantin and Doxycycline for 6 more days  -Avoid using THC gummies     Patient was seen and examined at bedside; patient reports feeling well; reports she wants to go home today. Discharge planning was discussed.  Vital Signs Last 24 Hrs  T(C): 36.4 (23 Apr 2023 05:00), Max: 36.4 (23 Apr 2023 05:00)  T(F): 97.6 (23 Apr 2023 05:00), Max: 97.6 (23 Apr 2023 05:00)  HR: 76 (23 Apr 2023 05:00) (69 - 76)  BP: 152/73 (23 Apr 2023 05:00) (104/61 - 152/73)  BP(mean): --  RR: 18 (22 Apr 2023 20:40) (16 - 18)  SpO2: 97% (23 Apr 2023 05:00) (97% - 98%)    Parameters below as of 22 Apr 2023 13:11  Patient On (Oxygen Delivery Method): room air  GENERAL: Well-nourished, Well-developed. NAD.  HEAD: No visible or palpable bumps or hematomas. No ecchymosis behind ears B/L.  Eyes: PERRLA, EOMI.  ENMT: MMM.   Neck: Supple. FROM  CVS: Normal S1,S2. No murmurs appreciated on auscultation   RESP: No use of accessory muscles. Chest rise symmetrical with good expansion. Lungs clear to auscultation B/L. No wheezing, rales, or rhonchi auscultated.  GI: Normal auscultation of bowel sounds in all 4 quadrants. Soft, Nontender, Nondistended. No guarding or rebound tenderness. No CVAT B/L.  Skin: Warm, Dry. No rashes or lesions. Good cap refill < 2 sec B/L.  EXT: Radial and pedal pulses present B/L. No calf tenderness or swelling B/L. No palpable cords. No pedal edema B/L.  Neuro: AAOX3, no focal deficit

## 2023-04-24 LAB
HCV AB S/CO SERPL IA: 0.04 COI — SIGNIFICANT CHANGE UP
HCV AB SERPL-IMP: SIGNIFICANT CHANGE UP
LEGIONELLA AG UR QL: NEGATIVE — SIGNIFICANT CHANGE UP

## 2023-04-27 DIAGNOSIS — Z79.82 LONG TERM (CURRENT) USE OF ASPIRIN: ICD-10-CM

## 2023-04-27 DIAGNOSIS — Z79.899 OTHER LONG TERM (CURRENT) DRUG THERAPY: ICD-10-CM

## 2023-04-27 DIAGNOSIS — T40.711A POISONING BY CANNABIS, ACCIDENTAL (UNINTENTIONAL), INITIAL ENCOUNTER: ICD-10-CM
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
## 2023-04-27 DIAGNOSIS — R59.0 LOCALIZED ENLARGED LYMPH NODES: ICD-10-CM

## 2023-04-27 DIAGNOSIS — Z86.73 PERSONAL HISTORY OF TRANSIENT ISCHEMIC ATTACK (TIA), AND CEREBRAL INFARCTION WITHOUT RESIDUAL DEFICITS: ICD-10-CM

## 2023-04-27 DIAGNOSIS — F32.A DEPRESSION, UNSPECIFIED: ICD-10-CM

## 2023-04-27 DIAGNOSIS — S70.11XA CONTUSION OF RIGHT THIGH, INITIAL ENCOUNTER: ICD-10-CM

## 2023-04-27 DIAGNOSIS — M79.7 FIBROMYALGIA: ICD-10-CM

## 2023-04-27 DIAGNOSIS — Z86.19 PERSONAL HISTORY OF OTHER INFECTIOUS AND PARASITIC DISEASES: ICD-10-CM

## 2023-04-27 DIAGNOSIS — Z79.891 LONG TERM (CURRENT) USE OF OPIATE ANALGESIC: ICD-10-CM

## 2023-04-27 DIAGNOSIS — M81.0 AGE-RELATED OSTEOPOROSIS WITHOUT CURRENT PATHOLOGICAL FRACTURE: ICD-10-CM

## 2023-04-27 DIAGNOSIS — G89.29 OTHER CHRONIC PAIN: ICD-10-CM

## 2023-04-27 DIAGNOSIS — F19.10 OTHER PSYCHOACTIVE SUBSTANCE ABUSE, UNCOMPLICATED: ICD-10-CM

## 2023-04-27 DIAGNOSIS — J69.0 PNEUMONITIS DUE TO INHALATION OF FOOD AND VOMIT: ICD-10-CM

## 2023-04-27 DIAGNOSIS — Z90.49 ACQUIRED ABSENCE OF OTHER SPECIFIED PARTS OF DIGESTIVE TRACT: ICD-10-CM

## 2023-04-27 DIAGNOSIS — Z87.891 PERSONAL HISTORY OF NICOTINE DEPENDENCE: ICD-10-CM

## 2023-04-27 DIAGNOSIS — H54.7 UNSPECIFIED VISUAL LOSS: ICD-10-CM

## 2023-04-27 DIAGNOSIS — F41.9 ANXIETY DISORDER, UNSPECIFIED: ICD-10-CM
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
## 2023-07-25 ENCOUNTER — INPATIENT (INPATIENT)
Facility: HOSPITAL | Age: 58
LOS: 1 days | Discharge: HOME | DRG: 425 | End: 2023-07-27
Attending: STUDENT IN AN ORGANIZED HEALTH CARE EDUCATION/TRAINING PROGRAM | Admitting: INTERNAL MEDICINE
Payer: MEDICAID

## 2023-07-25 VITALS
TEMPERATURE: 98 F | SYSTOLIC BLOOD PRESSURE: 171 MMHG | OXYGEN SATURATION: 98 % | RESPIRATION RATE: 18 BRPM | HEART RATE: 77 BPM | WEIGHT: 149.91 LBS | DIASTOLIC BLOOD PRESSURE: 112 MMHG

## 2023-07-25 DIAGNOSIS — R07.9 CHEST PAIN, UNSPECIFIED: ICD-10-CM

## 2023-07-25 DIAGNOSIS — Z98.891 HISTORY OF UTERINE SCAR FROM PREVIOUS SURGERY: Chronic | ICD-10-CM

## 2023-07-25 DIAGNOSIS — Z98.890 OTHER SPECIFIED POSTPROCEDURAL STATES: Chronic | ICD-10-CM

## 2023-07-25 DIAGNOSIS — Z90.49 ACQUIRED ABSENCE OF OTHER SPECIFIED PARTS OF DIGESTIVE TRACT: Chronic | ICD-10-CM

## 2023-07-25 DIAGNOSIS — S52.90XA UNSPECIFIED FRACTURE OF UNSPECIFIED FOREARM, INITIAL ENCOUNTER FOR CLOSED FRACTURE: Chronic | ICD-10-CM

## 2023-07-25 LAB
ALBUMIN SERPL ELPH-MCNC: 3.8 G/DL — SIGNIFICANT CHANGE UP (ref 3.5–5.2)
ALP SERPL-CCNC: 117 U/L — HIGH (ref 30–115)
ALT FLD-CCNC: 11 U/L — SIGNIFICANT CHANGE UP (ref 0–41)
ANION GAP SERPL CALC-SCNC: 12 MMOL/L — SIGNIFICANT CHANGE UP (ref 7–14)
AST SERPL-CCNC: 22 U/L — SIGNIFICANT CHANGE UP (ref 0–41)
BASOPHILS # BLD AUTO: 0.03 K/UL — SIGNIFICANT CHANGE UP (ref 0–0.2)
BASOPHILS NFR BLD AUTO: 0.5 % — SIGNIFICANT CHANGE UP (ref 0–1)
BILIRUB SERPL-MCNC: <0.2 MG/DL — SIGNIFICANT CHANGE UP (ref 0.2–1.2)
BUN SERPL-MCNC: 9 MG/DL — LOW (ref 10–20)
CALCIUM SERPL-MCNC: 8.8 MG/DL — SIGNIFICANT CHANGE UP (ref 8.4–10.5)
CHLORIDE SERPL-SCNC: 110 MMOL/L — SIGNIFICANT CHANGE UP (ref 98–110)
CO2 SERPL-SCNC: 20 MMOL/L — SIGNIFICANT CHANGE UP (ref 17–32)
CREAT SERPL-MCNC: 0.8 MG/DL — SIGNIFICANT CHANGE UP (ref 0.7–1.5)
EGFR: 84 ML/MIN/1.73M2 — SIGNIFICANT CHANGE UP
EOSINOPHIL # BLD AUTO: 0.11 K/UL — SIGNIFICANT CHANGE UP (ref 0–0.7)
EOSINOPHIL NFR BLD AUTO: 1.9 % — SIGNIFICANT CHANGE UP (ref 0–8)
GLUCOSE SERPL-MCNC: 94 MG/DL — SIGNIFICANT CHANGE UP (ref 70–99)
HCT VFR BLD CALC: 33.7 % — LOW (ref 37–47)
HGB BLD-MCNC: 10.4 G/DL — LOW (ref 12–16)
IMM GRANULOCYTES NFR BLD AUTO: 0.9 % — HIGH (ref 0.1–0.3)
LYMPHOCYTES # BLD AUTO: 1.32 K/UL — SIGNIFICANT CHANGE UP (ref 1.2–3.4)
LYMPHOCYTES # BLD AUTO: 22.8 % — SIGNIFICANT CHANGE UP (ref 20.5–51.1)
MAGNESIUM SERPL-MCNC: 1.9 MG/DL — SIGNIFICANT CHANGE UP (ref 1.8–2.4)
MCHC RBC-ENTMCNC: 23.8 PG — LOW (ref 27–31)
MCHC RBC-ENTMCNC: 30.9 G/DL — LOW (ref 32–37)
MCV RBC AUTO: 77.1 FL — LOW (ref 81–99)
MONOCYTES # BLD AUTO: 0.58 K/UL — SIGNIFICANT CHANGE UP (ref 0.1–0.6)
MONOCYTES NFR BLD AUTO: 10 % — HIGH (ref 1.7–9.3)
NEUTROPHILS # BLD AUTO: 3.69 K/UL — SIGNIFICANT CHANGE UP (ref 1.4–6.5)
NEUTROPHILS NFR BLD AUTO: 63.9 % — SIGNIFICANT CHANGE UP (ref 42.2–75.2)
NRBC # BLD: 0 /100 WBCS — SIGNIFICANT CHANGE UP (ref 0–0)
NT-PROBNP SERPL-SCNC: 918 PG/ML — HIGH (ref 0–300)
PLATELET # BLD AUTO: 248 K/UL — SIGNIFICANT CHANGE UP (ref 130–400)
PMV BLD: 9.3 FL — SIGNIFICANT CHANGE UP (ref 7.4–10.4)
POTASSIUM SERPL-MCNC: 4.3 MMOL/L — SIGNIFICANT CHANGE UP (ref 3.5–5)
POTASSIUM SERPL-SCNC: 4.3 MMOL/L — SIGNIFICANT CHANGE UP (ref 3.5–5)
PROT SERPL-MCNC: 6.7 G/DL — SIGNIFICANT CHANGE UP (ref 6–8)
RBC # BLD: 4.37 M/UL — SIGNIFICANT CHANGE UP (ref 4.2–5.4)
RBC # FLD: 19.1 % — HIGH (ref 11.5–14.5)
SODIUM SERPL-SCNC: 142 MMOL/L — SIGNIFICANT CHANGE UP (ref 135–146)
TROPONIN T SERPL-MCNC: <0.01 NG/ML — SIGNIFICANT CHANGE UP
WBC # BLD: 5.78 K/UL — SIGNIFICANT CHANGE UP (ref 4.8–10.8)
WBC # FLD AUTO: 5.78 K/UL — SIGNIFICANT CHANGE UP (ref 4.8–10.8)

## 2023-07-25 PROCEDURE — 84484 ASSAY OF TROPONIN QUANT: CPT

## 2023-07-25 PROCEDURE — 99223 1ST HOSP IP/OBS HIGH 75: CPT

## 2023-07-25 PROCEDURE — 83036 HEMOGLOBIN GLYCOSYLATED A1C: CPT

## 2023-07-25 PROCEDURE — G0378: CPT

## 2023-07-25 PROCEDURE — 84443 ASSAY THYROID STIM HORMONE: CPT

## 2023-07-25 PROCEDURE — 84480 ASSAY TRIIODOTHYRONINE (T3): CPT

## 2023-07-25 PROCEDURE — 93005 ELECTROCARDIOGRAM TRACING: CPT

## 2023-07-25 PROCEDURE — 86140 C-REACTIVE PROTEIN: CPT

## 2023-07-25 PROCEDURE — 93306 TTE W/DOPPLER COMPLETE: CPT

## 2023-07-25 PROCEDURE — 85652 RBC SED RATE AUTOMATED: CPT

## 2023-07-25 PROCEDURE — 84436 ASSAY OF TOTAL THYROXINE: CPT

## 2023-07-25 PROCEDURE — 80053 COMPREHEN METABOLIC PANEL: CPT

## 2023-07-25 PROCEDURE — 84145 PROCALCITONIN (PCT): CPT

## 2023-07-25 PROCEDURE — 71045 X-RAY EXAM CHEST 1 VIEW: CPT | Mod: 26

## 2023-07-25 PROCEDURE — 80061 LIPID PANEL: CPT

## 2023-07-25 PROCEDURE — 85025 COMPLETE CBC W/AUTO DIFF WBC: CPT

## 2023-07-25 PROCEDURE — 84100 ASSAY OF PHOSPHORUS: CPT

## 2023-07-25 PROCEDURE — 83735 ASSAY OF MAGNESIUM: CPT

## 2023-07-25 PROCEDURE — 80048 BASIC METABOLIC PNL TOTAL CA: CPT

## 2023-07-25 PROCEDURE — A9500: CPT

## 2023-07-25 PROCEDURE — 36415 COLL VENOUS BLD VENIPUNCTURE: CPT

## 2023-07-25 PROCEDURE — 78452 HT MUSCLE IMAGE SPECT MULT: CPT

## 2023-07-25 PROCEDURE — 99285 EMERGENCY DEPT VISIT HI MDM: CPT

## 2023-07-25 PROCEDURE — 93010 ELECTROCARDIOGRAM REPORT: CPT

## 2023-07-25 PROCEDURE — 83615 LACTATE (LD) (LDH) ENZYME: CPT

## 2023-07-25 RX ORDER — ONDANSETRON 8 MG/1
4 TABLET, FILM COATED ORAL EVERY 8 HOURS
Refills: 0 | Status: DISCONTINUED | OUTPATIENT
Start: 2023-07-25 | End: 2023-07-27

## 2023-07-25 RX ORDER — LANOLIN ALCOHOL/MO/W.PET/CERES
3 CREAM (GRAM) TOPICAL AT BEDTIME
Refills: 0 | Status: DISCONTINUED | OUTPATIENT
Start: 2023-07-25 | End: 2023-07-27

## 2023-07-25 RX ORDER — ENOXAPARIN SODIUM 100 MG/ML
40 INJECTION SUBCUTANEOUS EVERY 24 HOURS
Refills: 0 | Status: DISCONTINUED | OUTPATIENT
Start: 2023-07-26 | End: 2023-07-27

## 2023-07-25 RX ORDER — OXYCODONE HYDROCHLORIDE 5 MG/1
10 TABLET ORAL ONCE
Refills: 0 | Status: DISCONTINUED | OUTPATIENT
Start: 2023-07-25 | End: 2023-07-25

## 2023-07-25 RX ORDER — AZITHROMYCIN 500 MG/1
500 TABLET, FILM COATED ORAL DAILY
Refills: 0 | Status: DISCONTINUED | OUTPATIENT
Start: 2023-07-26 | End: 2023-07-26

## 2023-07-25 RX ORDER — ASPIRIN/CALCIUM CARB/MAGNESIUM 324 MG
81 TABLET ORAL DAILY
Refills: 0 | Status: DISCONTINUED | OUTPATIENT
Start: 2023-07-26 | End: 2023-07-27

## 2023-07-25 RX ORDER — CEFTRIAXONE 500 MG/1
1000 INJECTION, POWDER, FOR SOLUTION INTRAMUSCULAR; INTRAVENOUS ONCE
Refills: 0 | Status: COMPLETED | OUTPATIENT
Start: 2023-07-25 | End: 2023-07-25

## 2023-07-25 RX ORDER — FUROSEMIDE 40 MG
40 TABLET ORAL DAILY
Refills: 0 | Status: DISCONTINUED | OUTPATIENT
Start: 2023-07-26 | End: 2023-07-26

## 2023-07-25 RX ORDER — ACETAMINOPHEN 500 MG
650 TABLET ORAL EVERY 6 HOURS
Refills: 0 | Status: DISCONTINUED | OUTPATIENT
Start: 2023-07-25 | End: 2023-07-27

## 2023-07-25 RX ORDER — AZITHROMYCIN 500 MG/1
500 TABLET, FILM COATED ORAL ONCE
Refills: 0 | Status: COMPLETED | OUTPATIENT
Start: 2023-07-25 | End: 2023-07-25

## 2023-07-25 RX ORDER — FUROSEMIDE 40 MG
20 TABLET ORAL ONCE
Refills: 0 | Status: COMPLETED | OUTPATIENT
Start: 2023-07-25 | End: 2023-07-25

## 2023-07-25 RX ORDER — CEFTRIAXONE 500 MG/1
1000 INJECTION, POWDER, FOR SOLUTION INTRAMUSCULAR; INTRAVENOUS EVERY 24 HOURS
Refills: 0 | Status: DISCONTINUED | OUTPATIENT
Start: 2023-07-26 | End: 2023-07-26

## 2023-07-25 RX ORDER — HYDRALAZINE HCL 50 MG
10 TABLET ORAL THREE TIMES A DAY
Refills: 0 | Status: DISCONTINUED | OUTPATIENT
Start: 2023-07-25 | End: 2023-07-27

## 2023-07-25 RX ADMIN — CEFTRIAXONE 100 MILLIGRAM(S): 500 INJECTION, POWDER, FOR SOLUTION INTRAMUSCULAR; INTRAVENOUS at 22:13

## 2023-07-25 RX ADMIN — OXYCODONE HYDROCHLORIDE 10 MILLIGRAM(S): 5 TABLET ORAL at 20:16

## 2023-07-25 RX ADMIN — Medication 20 MILLIGRAM(S): at 22:13

## 2023-07-25 NOTE — H&P ADULT - ASSESSMENT
58yF asthma former smoker chronic back pain and hx hip replacement p/w chest pain x 1 week, Chest pain is Left chest described as constant at times sharp w radiation down the left arm to the finger tips; 10/10 at is height yet on exam 2/10.    # ? PNA   b/l opacities on wet read from ED, await final read    no leukocytosis   afebrile/ no tachycardia/ no tachypnea   ESR/CRP am   azithromycin & rocephin x1 in ED   continue azithromycin 500 mg po & ceftriaxone 1 gm daily   discontinue abx in next 24 hrs if no s/s pna   room air w sats > 92%   encourage ambulation       #Chest Pain   Strong familial cardiac history  MI CAD CHF  L chest w left arm radiation to finger tips   associated w nausea only   on exam chest pain 2/10   troponin neg; repeat in am   TSH/lipid/A1C   EKG am    #Congestive Heart Failure  #interstitial congestion     ECHO am   lasix 20 mg ED; start 40 mg daily until euvolemic   heart healthy w 2L fluid restriction     #HTN  SBP 170s on exam in ED   reportedly on no anti HTNs   PRN hydralazine     #H/O CVA 2016 2018   no residual weakness   continue asa 81 mg     #former smoker   tobacco use x 30 yrs   quit 1 year ago     #Insomnia   continue home ambien     #fibromyalgia   continue home lyrica dose 150 mg  58yF asthma former smoker chronic back pain and hx hip replacement p/w chest pain x 1 week, Chest pain is Left chest described as constant at times sharp w radiation down the left arm to the finger tips; 10/10 at its height yet on exam 2/10.    # ? PNA   b/l opacities on wet read from ED, await final read    no leukocytosis   afebrile/ no tachycardia/ no tachypnea   ESR/CRP am   azithromycin & rocephin x1 in ED   continue azithromycin 500 mg po & ceftriaxone 1 gm daily   discontinue abx in next 24 hrs if no s/s pna   room air w sats > 92%   encourage ambulation       #Chest Pain   Strong familial cardiac history  MI CAD CHF  L chest w left arm radiation to finger tips   associated w nausea only   on exam chest pain 2/10   troponin neg; repeat in am   TSH/lipid/A1C   EKG am    #Congestive Heart Failure  #interstitial congestion     ECHO am   lasix 20 mg ED; start 40 mg daily until euvolemic   heart healthy w 2L fluid restriction     #HTN  SBP 170s on exam in ED   reportedly on no anti HTNs   PRN hydralazine     #H/O CVA 2016 2018   no residual weakness   continue asa 81 mg     #former smoker   tobacco use x 30 yrs   quit 1 year ago     #Insomnia   continue home ambien     #fibromyalgia   continue home lyrica dose 150 mg  58yF asthma former smoker chronic back pain and hx hip replacement p/w chest pain x 1 week, Chest pain is Left chest described as constant at times sharp w radiation down the left arm to the finger tips; 10/10 at its height yet on exam 2/10.    # ? PNA   b/l opacities on wet read from ED, await final read    no leukocytosis   afebrile/ no tachycardia/ no tachypnea   ESR/CRP am   azithromycin & rocephin x1 in ED   continue azithromycin 500 mg po & ceftriaxone 1 gm daily   discontinue abx in next 24 hrs if no s/s pna   room air w sats > 92%   encourage ambulation       #Chest Pain   Strong familial cardiac history  MI CAD CHF  admit to tele  L chest w left arm radiation to finger tips   associated w nausea only   on exam chest pain 2/10   troponin neg; repeat in am   TSH/lipid/A1C   EKG am    #Congestive Heart Failure  #interstitial congestion     ECHO am   lasix 20 mg ED; start 40 mg daily until euvolemic   heart healthy w 2L fluid restriction     #HTN  SBP 170s on exam in ED   reportedly on no anti HTNs   PRN hydralazine     #H/O CVA 2016 2018   no residual weakness   continue asa 81 mg     #former smoker   tobacco use x 30 yrs   quit 1 year ago     #Insomnia   continue home ambien     #fibromyalgia   continue home lyrica dose 150 mg

## 2023-07-25 NOTE — ED PROVIDER NOTE - OBJECTIVE STATEMENT
58yF asthma chronic back pain and hx hip replacement p/w chest pain, intermittent over the past week but worsened today.  Reports L anterior chest pressure now radiating down L arm, worse w/ exertion.  No fever, cough, abd pain, n/v/d.  No hx CAD or CHF, though pt w/ strong fhx CAD (both parents, sister).  Pt former smoker (quit 1yr ago).  Tried using her inhaler w/ some relief of her sx, though CP persisted.

## 2023-07-25 NOTE — H&P ADULT - NSHPPHYSICALEXAM_GEN_ALL_CORE
VITALS:   T(C): 36.8 (07-25-23 @ 20:44), Max: 36.8 (07-25-23 @ 20:44)  HR: 77 (07-25-23 @ 18:04) (77 - 77)  BP: 167/92 (07-25-23 @ 20:44) (167/92 - 171/112)  RR: 18 (07-25-23 @ 20:44) (18 - 18)  SpO2: 99% (07-25-23 @ 20:44) (98% - 99%)    GENERAL: NAD, lying in bed comfortably eating cookies and hot tea   HEAD:  Atraumatic, normocephalic  EYES: EOMI, PERRLA, conjunctiva and sclera clear  ENT: Moist mucous membranes  NECK: Supple, no JVD  HEART: Regular rate and rhythm, no murmurs, rubs, or gallops  LUNGS: Unlabored respirations.  Clear to auscultation bilaterally, no crackles, wheezing, or rhonchi  ABDOMEN: Soft, nontender, nondistended, +BS  EXTREMITIES: 2+ peripheral pulses bilaterally. No clubbing, cyanosis, or edema  NERVOUS SYSTEM:  A&Ox3, no focal deficits   SKIN: No rashes or lesions

## 2023-07-25 NOTE — H&P ADULT - NS ATTEND AMEND GEN_ALL_CORE FT
Seen while in the ER, agree with assessment (if pneumonia, strongly doubt gram negative infection) and plan as outlined Seen while in the ER, agree with assessment (if pneumonia, strongly doubt gram negative infection) and plan as outlined. Also if CHF is verified, suspect HFpEF based on EF of 69% seen on nuclear medicine study done July 2015

## 2023-07-25 NOTE — ED ADULT NURSE NOTE - NSFALLUNIVINTERV_ED_ALL_ED
Bed/Stretcher in lowest position, wheels locked, appropriate side rails in place/Call bell, personal items and telephone in reach/Instruct patient to call for assistance before getting out of bed/chair/stretcher/Non-slip footwear applied when patient is off stretcher/[place] to call system/Physically safe environment - no spills, clutter or unnecessary equipment/Purposeful proactive rounding/Room/bathroom lighting operational, light cord in reach

## 2023-07-25 NOTE — H&P ADULT - HISTORY OF PRESENT ILLNESS
58yF asthma former smoker chronic back pain and hx hip replacement p/w chest pain x 1 week, Chest pain is Left chest described as constant at times sharp w radiation down the left arm to the finger tips; 10/10 at is height yet on exam 2/10.  Chest pain associated w nausea x 1 last week. Trial symptom relief w inhaler with minimal relief.  Patient denied palpitations sick contacts SOB fever vomiting numbness/tingling of extremity; no radiation of pain to jaw or neck; no abdominal pain or diarrhea. Strong familial history of MI CHF and CAD in siblings and parents.  Of note, former smoker 30 years, quit x 1 year. In the ED received 1x dose azithromycin & rochephin; 20 mg lasix; troponin; imaging w noted opacities. Discussed case with MD, will admit for chest pain work up.  58yF asthma former smoker chronic back pain and hx hip replacement p/w chest pain x 1 week, Chest pain is Left chest described as constant at times sharp w radiation down the left arm to the finger tips; 10/10 at its height yet on exam 2/10.  Chest pain associated w nausea x 1 last week. Trial symptom relief w inhaler with minimal relief.  Patient denied palpitations sick contacts SOB fever vomiting numbness/tingling of extremity; no radiation of pain to jaw or neck; no abdominal pain or diarrhea. Strong familial history of MI CHF and CAD in siblings and parents.  Of note, former smoker 30 years, quit x 1 year. In the ED received 1x dose azithromycin & rochephin; 20 mg lasix; troponin; imaging w noted opacities. Discussed case with MD, will admit for chest pain work up.

## 2023-07-25 NOTE — H&P ADULT - NSHPLABSRESULTS_GEN_ALL_CORE
10.4   5.78  )-----------( 248      ( 25 Jul 2023 18:32 )             33.7     07-25    142  |  110  |  9<L>  ----------------------------<  94  4.3   |  20  |  0.8    Ca    8.8      25 Jul 2023 18:32  Mg     1.9     07-25    TPro  6.7  /  Alb  3.8  /  TBili  <0.2  /  DBili  x   /  AST  22  /  ALT  11  /  AlkPhos  117<H>  07-25    Magnesium: 1.9 mg/dL (07-25-23 @ 18:32)    Urinalysis Basic - ( 25 Jul 2023 18:32 )    Color: x / Appearance: x / SG: x / pH: x  Gluc: 94 mg/dL / Ketone: x  / Bili: x / Urobili: x   Blood: x / Protein: x / Nitrite: x   Leuk Esterase: x / RBC: x / WBC x   Sq Epi: x / Non Sq Epi: x / Bacteria: x    Lactate Trend    CARDIAC MARKERS ( 25 Jul 2023 18:32 )  x     / <0.01 ng/mL / x     / x     / x 61

## 2023-07-25 NOTE — H&P ADULT - NSICDXFAMHXNEG_GEN_ALL
atrial fibrillation/brain aneurysm/cancer/diabetes/irritable bowel syndrome/kidney disease/thyroid disease/VTE

## 2023-07-25 NOTE — H&P ADULT - CONVERSATION DETAILS
Per conversation in the ED, patient would NOT like to be intubated and placed on a vent.   Wants CPR and all other measures

## 2023-07-26 LAB
A1C WITH ESTIMATED AVERAGE GLUCOSE RESULT: 5.4 % — SIGNIFICANT CHANGE UP (ref 4–5.6)
ANION GAP SERPL CALC-SCNC: 16 MMOL/L — HIGH (ref 7–14)
BASOPHILS # BLD AUTO: 0.07 K/UL — SIGNIFICANT CHANGE UP (ref 0–0.2)
BASOPHILS NFR BLD AUTO: 0.8 % — SIGNIFICANT CHANGE UP (ref 0–1)
BUN SERPL-MCNC: 8 MG/DL — LOW (ref 10–20)
CALCIUM SERPL-MCNC: 9.7 MG/DL — SIGNIFICANT CHANGE UP (ref 8.4–10.5)
CHLORIDE SERPL-SCNC: 104 MMOL/L — SIGNIFICANT CHANGE UP (ref 98–110)
CHOLEST SERPL-MCNC: 220 MG/DL — HIGH
CO2 SERPL-SCNC: 23 MMOL/L — SIGNIFICANT CHANGE UP (ref 17–32)
CREAT SERPL-MCNC: 0.7 MG/DL — SIGNIFICANT CHANGE UP (ref 0.7–1.5)
CRP SERPL-MCNC: <3 MG/L — SIGNIFICANT CHANGE UP
EGFR: 99 ML/MIN/1.73M2 — SIGNIFICANT CHANGE UP
EOSINOPHIL # BLD AUTO: 0.18 K/UL — SIGNIFICANT CHANGE UP (ref 0–0.7)
EOSINOPHIL NFR BLD AUTO: 2.2 % — SIGNIFICANT CHANGE UP (ref 0–8)
ERYTHROCYTE [SEDIMENTATION RATE] IN BLOOD: 20 MM/HR — SIGNIFICANT CHANGE UP (ref 0–20)
ESTIMATED AVERAGE GLUCOSE: 108 MG/DL — SIGNIFICANT CHANGE UP (ref 68–114)
GLUCOSE SERPL-MCNC: 100 MG/DL — HIGH (ref 70–99)
HCT VFR BLD CALC: 41.2 % — SIGNIFICANT CHANGE UP (ref 37–47)
HDLC SERPL-MCNC: 95 MG/DL — SIGNIFICANT CHANGE UP
HGB BLD-MCNC: 12.7 G/DL — SIGNIFICANT CHANGE UP (ref 12–16)
IMM GRANULOCYTES NFR BLD AUTO: 0.5 % — HIGH (ref 0.1–0.3)
LDH SERPL L TO P-CCNC: 239 — SIGNIFICANT CHANGE UP (ref 50–242)
LIPID PNL WITH DIRECT LDL SERPL: 97 MG/DL — SIGNIFICANT CHANGE UP
LYMPHOCYTES # BLD AUTO: 1.69 K/UL — SIGNIFICANT CHANGE UP (ref 1.2–3.4)
LYMPHOCYTES # BLD AUTO: 20.3 % — LOW (ref 20.5–51.1)
MAGNESIUM SERPL-MCNC: 1.9 MG/DL — SIGNIFICANT CHANGE UP (ref 1.8–2.4)
MCHC RBC-ENTMCNC: 23.3 PG — LOW (ref 27–31)
MCHC RBC-ENTMCNC: 30.8 G/DL — LOW (ref 32–37)
MCV RBC AUTO: 75.7 FL — LOW (ref 81–99)
MONOCYTES # BLD AUTO: 0.75 K/UL — HIGH (ref 0.1–0.6)
MONOCYTES NFR BLD AUTO: 9 % — SIGNIFICANT CHANGE UP (ref 1.7–9.3)
NEUTROPHILS # BLD AUTO: 5.59 K/UL — SIGNIFICANT CHANGE UP (ref 1.4–6.5)
NEUTROPHILS NFR BLD AUTO: 67.2 % — SIGNIFICANT CHANGE UP (ref 42.2–75.2)
NON HDL CHOLESTEROL: 125 MG/DL — SIGNIFICANT CHANGE UP
NRBC # BLD: 0 /100 WBCS — SIGNIFICANT CHANGE UP (ref 0–0)
PHOSPHATE SERPL-MCNC: 4.1 MG/DL — SIGNIFICANT CHANGE UP (ref 2.1–4.9)
PLATELET # BLD AUTO: 332 K/UL — SIGNIFICANT CHANGE UP (ref 130–400)
PMV BLD: 9.1 FL — SIGNIFICANT CHANGE UP (ref 7.4–10.4)
POTASSIUM SERPL-MCNC: 3.8 MMOL/L — SIGNIFICANT CHANGE UP (ref 3.5–5)
POTASSIUM SERPL-SCNC: 3.8 MMOL/L — SIGNIFICANT CHANGE UP (ref 3.5–5)
RBC # BLD: 5.44 M/UL — HIGH (ref 4.2–5.4)
RBC # FLD: 19.7 % — HIGH (ref 11.5–14.5)
SODIUM SERPL-SCNC: 143 MMOL/L — SIGNIFICANT CHANGE UP (ref 135–146)
T3 SERPL-MCNC: 124 NG/DL — SIGNIFICANT CHANGE UP (ref 80–200)
T4 AB SER-ACNC: 7.5 UG/DL — SIGNIFICANT CHANGE UP (ref 4.6–12)
TRIGL SERPL-MCNC: 140 MG/DL — SIGNIFICANT CHANGE UP
TROPONIN T SERPL-MCNC: <0.01 NG/ML — SIGNIFICANT CHANGE UP
TSH SERPL-MCNC: 3.13 UIU/ML — SIGNIFICANT CHANGE UP (ref 0.27–4.2)
WBC # BLD: 8.32 K/UL — SIGNIFICANT CHANGE UP (ref 4.8–10.8)
WBC # FLD AUTO: 8.32 K/UL — SIGNIFICANT CHANGE UP (ref 4.8–10.8)

## 2023-07-26 PROCEDURE — 93306 TTE W/DOPPLER COMPLETE: CPT | Mod: 26

## 2023-07-26 PROCEDURE — 99406 BEHAV CHNG SMOKING 3-10 MIN: CPT

## 2023-07-26 PROCEDURE — 99233 SBSQ HOSP IP/OBS HIGH 50: CPT

## 2023-07-26 PROCEDURE — 99222 1ST HOSP IP/OBS MODERATE 55: CPT

## 2023-07-26 PROCEDURE — 93010 ELECTROCARDIOGRAM REPORT: CPT

## 2023-07-26 RX ORDER — FAMOTIDINE 10 MG/ML
20 INJECTION INTRAVENOUS EVERY 24 HOURS
Refills: 0 | Status: DISCONTINUED | OUTPATIENT
Start: 2023-07-26 | End: 2023-07-27

## 2023-07-26 RX ORDER — DIAZEPAM 5 MG
1 TABLET ORAL
Qty: 0 | Refills: 0 | DISCHARGE

## 2023-07-26 RX ORDER — ZOLPIDEM TARTRATE 10 MG/1
5 TABLET ORAL AT BEDTIME
Refills: 0 | Status: DISCONTINUED | OUTPATIENT
Start: 2023-07-26 | End: 2023-07-27

## 2023-07-26 RX ORDER — OXYCODONE HYDROCHLORIDE 5 MG/1
10 TABLET ORAL EVERY 6 HOURS
Refills: 0 | Status: DISCONTINUED | OUTPATIENT
Start: 2023-07-26 | End: 2023-07-27

## 2023-07-26 RX ORDER — REGADENOSON 0.08 MG/ML
0.4 INJECTION, SOLUTION INTRAVENOUS ONCE
Refills: 0 | Status: DISCONTINUED | OUTPATIENT
Start: 2023-07-26 | End: 2023-07-27

## 2023-07-26 RX ORDER — ATORVASTATIN CALCIUM 80 MG/1
40 TABLET, FILM COATED ORAL AT BEDTIME
Refills: 0 | Status: DISCONTINUED | OUTPATIENT
Start: 2023-07-26 | End: 2023-07-27

## 2023-07-26 RX ORDER — FUROSEMIDE 40 MG
20 TABLET ORAL DAILY
Refills: 0 | Status: DISCONTINUED | OUTPATIENT
Start: 2023-07-26 | End: 2023-07-27

## 2023-07-26 RX ORDER — DIPHENHYDRAMINE HCL 50 MG
0 CAPSULE ORAL
Qty: 0 | Refills: 0 | DISCHARGE

## 2023-07-26 RX ADMIN — ENOXAPARIN SODIUM 40 MILLIGRAM(S): 100 INJECTION SUBCUTANEOUS at 21:17

## 2023-07-26 RX ADMIN — OXYCODONE HYDROCHLORIDE 10 MILLIGRAM(S): 5 TABLET ORAL at 09:34

## 2023-07-26 RX ADMIN — OXYCODONE HYDROCHLORIDE 10 MILLIGRAM(S): 5 TABLET ORAL at 09:56

## 2023-07-26 RX ADMIN — Medication 150 MILLIGRAM(S): at 17:19

## 2023-07-26 RX ADMIN — OXYCODONE HYDROCHLORIDE 10 MILLIGRAM(S): 5 TABLET ORAL at 23:43

## 2023-07-26 RX ADMIN — ATORVASTATIN CALCIUM 40 MILLIGRAM(S): 80 TABLET, FILM COATED ORAL at 21:16

## 2023-07-26 RX ADMIN — Medication 150 MILLIGRAM(S): at 06:22

## 2023-07-26 RX ADMIN — Medication 81 MILLIGRAM(S): at 12:03

## 2023-07-26 RX ADMIN — FAMOTIDINE 20 MILLIGRAM(S): 10 INJECTION INTRAVENOUS at 06:47

## 2023-07-26 RX ADMIN — AZITHROMYCIN 255 MILLIGRAM(S): 500 TABLET, FILM COATED ORAL at 00:40

## 2023-07-26 RX ADMIN — OXYCODONE HYDROCHLORIDE 10 MILLIGRAM(S): 5 TABLET ORAL at 17:43

## 2023-07-26 RX ADMIN — OXYCODONE HYDROCHLORIDE 10 MILLIGRAM(S): 5 TABLET ORAL at 04:38

## 2023-07-26 RX ADMIN — Medication 40 MILLIGRAM(S): at 06:22

## 2023-07-26 RX ADMIN — ZOLPIDEM TARTRATE 5 MILLIGRAM(S): 10 TABLET ORAL at 00:05

## 2023-07-26 RX ADMIN — ZOLPIDEM TARTRATE 5 MILLIGRAM(S): 10 TABLET ORAL at 00:30

## 2023-07-26 RX ADMIN — OXYCODONE HYDROCHLORIDE 10 MILLIGRAM(S): 5 TABLET ORAL at 03:30

## 2023-07-26 NOTE — CONSULT NOTE ADULT - ASSESSMENT
58yF asthma former smoker chronic back pain and hx hip replacement p/w chest pain x 1 week, Chest pain is Left chest described as constant at times sharp w radiation down the left arm to the finger tips; 10/10 at its height yet on exam 2/10. Pain atypical for cardiac. Enzymes neg. But patient use to smoke. She has strong FH CAd. She needs a echo. She has a strong dye allergy. Will consider Lexiscan

## 2023-07-26 NOTE — PROGRESS NOTE ADULT - SUBJECTIVE AND OBJECTIVE BOX
SUBJECTIVE:    Patient is a 58y old Female who presents with a chief complaint of   Currently admitted to medicine with the primary diagnosis of Chest pain       Today is hospital day 1d. This morning she is resting comfortably in bed and reports no new issues or overnight events.     ROS:   CONSTITUTIONAL: No weakness, fevers or chills   EYES/ENT: No visual changes; No vertigo or throat pain   NECK: No pain or stiffness   RESPIRATORY: No cough, wheezing, hemoptysis; No shortness of breath   CARDIOVASCULAR: No chest pain or palpitations   GASTROINTESTINAL: No abdominal or epigastric pain. No nausea, vomiting, or hematemesis; No diarrhea or constipation. No melena or hematochezia.  GENITOURINARY: No dysuria, frequency or hematuria  NEUROLOGICAL: No numbness or weakness        PAST MEDICAL & SURGICAL HISTORY  Fibromyalgia    Lyme disease    Back pain, chronic    Polysubstance dependence    Restless leg syndrome    Right hip pain    H/O fracture of rib  10+ yeras ago    Former smoker    Cerebrovascular accident (CVA)  in 2016 and 2018 NO residual    History of appendectomy    History of  section    Fracture, radius    History of gastric surgery      SOCIAL HISTORY:    ALLERGIES:  No Known Allergies    MEDICATIONS:  STANDING MEDICATIONS  aspirin  chewable 81 milliGRAM(s) Oral daily  azithromycin   Tablet 500 milliGRAM(s) Oral daily  cefTRIAXone   IVPB 1000 milliGRAM(s) IV Intermittent every 24 hours  enoxaparin Injectable 40 milliGRAM(s) SubCutaneous every 24 hours  famotidine Injectable 20 milliGRAM(s) IV Push every 24 hours  furosemide   Injectable 40 milliGRAM(s) IV Push daily  pregabalin 150 milliGRAM(s) Oral two times a day  regadenoson Injectable 0.4 milliGRAM(s) IV Push once    PRN MEDICATIONS  acetaminophen     Tablet .. 650 milliGRAM(s) Oral every 6 hours PRN  aluminum hydroxide/magnesium hydroxide/simethicone Suspension 30 milliLiter(s) Oral every 4 hours PRN  hydrALAZINE Injectable 10 milliGRAM(s) IV Push three times a day PRN  melatonin 3 milliGRAM(s) Oral at bedtime PRN  ondansetron Injectable 4 milliGRAM(s) IV Push every 8 hours PRN  oxyCODONE    IR 10 milliGRAM(s) Oral every 6 hours PRN  zolpidem 5 milliGRAM(s) Oral at bedtime PRN  zolpidem 5 milliGRAM(s) Oral at bedtime PRN    VITALS:   T(F): 97.3  HR: 91  BP: 118/93  RR: 18  SpO2: 98%    LABS:  Negative for smoking/alcohol/drug use.                         12.7   8.32  )-----------( 332      ( 2023 08:18 )             41.2         143  |  104  |  8<L>  ----------------------------<  100<H>  3.8   |  23  |  0.7    Ca    9.7      2023 08:18  Phos  4.1       Mg     1.9         TPro  6.7  /  Alb  3.8  /  TBili  <0.2  /  DBili  x   /  AST  22  /  ALT  11  /  AlkPhos  117<H>        Urinalysis Basic - ( 2023 08:18 )    Color: x / Appearance: x / SG: x / pH: x  Gluc: 100 mg/dL / Ketone: x  / Bili: x / Urobili: x   Blood: x / Protein: x / Nitrite: x   Leuk Esterase: x / RBC: x / WBC x   Sq Epi: x / Non Sq Epi: x / Bacteria: x        Sedimentation Rate, Erythrocyte: 20 mm/Hr (23 @ 08:18)  Troponin T, Serum: <0.01 ng/mL (23 @ 08:18)  Troponin T, Serum: <0.01 ng/mL (23 @ 18:32)      CARDIAC MARKERS ( 2023 08:18 )  x     / <0.01 ng/mL / x     / x     / x      CARDIAC MARKERS ( 2023 18:32 )  x     / <0.01 ng/mL / x     / x     / x          RADIOLOGY:    PHYSICAL EXAM:  GEN: No acute distress  HEENT: normocephalic, atraumatic, aniceteric  LUNGS: bl breaht sounds   HEART: S1/S2 present. RRR, no murmurs  ABD: Soft, non-tender, non-distended. Bowel sounds present  EXT: warm   NEURO: AAOX3, normal affect      ASSESSMENT AND PLAN:    58yF asthma former smoker chronic back pain and hx hip replacement p/w chest pain x 1 week, Chest pain is Left chest described as constant at times sharp w radiation down the left arm to the finger tips; 10/10 at its height yet on exam 2/10      #Midsternal Chest Pain   h/o Strong familial cardiac history  MI CAD CHF  # H/O HFpEF  # former smoker, now vapes (noted to vape in ED)  - on room air , euvolemic on exam   - no cough / no fever/ no leukocytosis - will stop abx   - cw telemetry   - pain radiates to left arm and associated with mild nausea which is now resolved   - CXR with interstitial congestion  - EKG no acute ischemic changes, trop neg x2 , pbnp 918  - TTE - 57% EF , mild-mod MVR, mild LA enlargement   - TSH 3.13 /lipid profile noted /A1C  5.4  - intake and output / fluid restriction   - was started on lasix po   - possible nuclear stress test in AM , pending cardio fu given dye allergy  / NPO after MN     #H/O HTN  SBP 170s on exam in ED   reportedly on no anti HTNs   PRN hydralazine   dash diet     #H/O CVA 2016   no residual weakness   continue asa 81 mg , start statin     #former smoker , now vapes   tobacco use x 30 yrs   quit 1 year ago   noted to be vaping in ED    on cessation     #Insomnia - continue home ambien     #fibromyalgia - continue home lyrica dose 150 mg     # dvt/gi ppx/diet  # dospo: acute   # handoff: nuclear stress test in AM  SUBJECTIVE:    Patient is a 58y old Female who presents with a chief complaint of   Currently admitted to medicine with the primary diagnosis of Chest pain       Today is hospital day 1d. This morning she is resting comfortably in bed and reports no new issues or overnight events.     ROS:   CONSTITUTIONAL: No weakness, fevers or chills   EYES/ENT: No visual changes; No vertigo or throat pain   NECK: No pain or stiffness   RESPIRATORY: No cough, wheezing, hemoptysis; No shortness of breath   CARDIOVASCULAR: No chest pain or palpitations   GASTROINTESTINAL: No abdominal or epigastric pain. No nausea, vomiting, or hematemesis; No diarrhea or constipation. No melena or hematochezia.  GENITOURINARY: No dysuria, frequency or hematuria  NEUROLOGICAL: No numbness or weakness        PAST MEDICAL & SURGICAL HISTORY  Fibromyalgia    Lyme disease    Back pain, chronic    Polysubstance dependence    Restless leg syndrome    Right hip pain    H/O fracture of rib  10+ yeras ago    Former smoker    Cerebrovascular accident (CVA)  in 2016 and 2018 NO residual    History of appendectomy    History of  section    Fracture, radius    History of gastric surgery      SOCIAL HISTORY:    ALLERGIES:  No Known Allergies    MEDICATIONS:  STANDING MEDICATIONS  aspirin  chewable 81 milliGRAM(s) Oral daily  azithromycin   Tablet 500 milliGRAM(s) Oral daily  cefTRIAXone   IVPB 1000 milliGRAM(s) IV Intermittent every 24 hours  enoxaparin Injectable 40 milliGRAM(s) SubCutaneous every 24 hours  famotidine Injectable 20 milliGRAM(s) IV Push every 24 hours  furosemide   Injectable 40 milliGRAM(s) IV Push daily  pregabalin 150 milliGRAM(s) Oral two times a day  regadenoson Injectable 0.4 milliGRAM(s) IV Push once    PRN MEDICATIONS  acetaminophen     Tablet .. 650 milliGRAM(s) Oral every 6 hours PRN  aluminum hydroxide/magnesium hydroxide/simethicone Suspension 30 milliLiter(s) Oral every 4 hours PRN  hydrALAZINE Injectable 10 milliGRAM(s) IV Push three times a day PRN  melatonin 3 milliGRAM(s) Oral at bedtime PRN  ondansetron Injectable 4 milliGRAM(s) IV Push every 8 hours PRN  oxyCODONE    IR 10 milliGRAM(s) Oral every 6 hours PRN  zolpidem 5 milliGRAM(s) Oral at bedtime PRN  zolpidem 5 milliGRAM(s) Oral at bedtime PRN    VITALS:   T(F): 97.3  HR: 91  BP: 118/93  RR: 18  SpO2: 98%    LABS:  Negative for smoking/alcohol/drug use.                         12.7   8.32  )-----------( 332      ( 2023 08:18 )             41.2         143  |  104  |  8<L>  ----------------------------<  100<H>  3.8   |  23  |  0.7    Ca    9.7      2023 08:18  Phos  4.1       Mg     1.9         TPro  6.7  /  Alb  3.8  /  TBili  <0.2  /  DBili  x   /  AST  22  /  ALT  11  /  AlkPhos  117<H>        Urinalysis Basic - ( 2023 08:18 )    Color: x / Appearance: x / SG: x / pH: x  Gluc: 100 mg/dL / Ketone: x  / Bili: x / Urobili: x   Blood: x / Protein: x / Nitrite: x   Leuk Esterase: x / RBC: x / WBC x   Sq Epi: x / Non Sq Epi: x / Bacteria: x        Sedimentation Rate, Erythrocyte: 20 mm/Hr (23 @ 08:18)  Troponin T, Serum: <0.01 ng/mL (23 @ 08:18)  Troponin T, Serum: <0.01 ng/mL (23 @ 18:32)      CARDIAC MARKERS ( 2023 08:18 )  x     / <0.01 ng/mL / x     / x     / x      CARDIAC MARKERS ( 2023 18:32 )  x     / <0.01 ng/mL / x     / x     / x          RADIOLOGY:    PHYSICAL EXAM:  GEN: No acute distress  HEENT: normocephalic, atraumatic, aniceteric  LUNGS: bl breaht sounds   HEART: S1/S2 present. RRR, no murmurs  ABD: Soft, non-tender, non-distended. Bowel sounds present  EXT: warm   NEURO: AAOX3, normal affect      ASSESSMENT AND PLAN:    58yF asthma former smoker chronic back pain and hx hip replacement p/w chest pain x 1 week, Chest pain is Left chest described as constant at times sharp w radiation down the left arm to the finger tips; 10/10 at its height yet on exam 2/10      #Midsternal Chest Pain   h/o Strong familial cardiac history  MI CAD CHF  # H/O HFpEF  # former smoker, now vapes (noted to vape in ED)  - on room air , euvolemic on exam   - no cough / no fever/ no leukocytosis - will stop abx   - cw telemetry   - pain radiates to left arm and associated with mild nausea which is now resolved   - CXR with interstitial congestion  - EKG no acute ischemic changes, trop neg x2 , pbnp 918  - TTE - 57% EF , mild-mod MVR, mild LA enlargement   - TSH 3.13 /lipid profile noted /A1C  5.4  - intake and output / fluid restriction   - was started on lasix po   - possible nuclear stress test in AM , pending cardio fu given dye allergy  / NPO after MN     #H/O HTN  SBP 170s on exam in ED   reportedly on no anti HTNs   PRN hydralazine   dash diet     #H/O CVA 2016   no residual weakness   continue asa 81 mg , start statin     #former smoker , now vapes   tobacco use x 30 yrs   quit 1 year ago   noted to be vaping in ED    on cessation     #Insomnia - continue home ambien     #h/o fibromyalgia and chronic back pain - continue home pain management medications     # dvt/gi ppx/diet  # dospo: acute   # handoff: nuclear stress test in AM

## 2023-07-26 NOTE — CONSULT NOTE ADULT - SUBJECTIVE AND OBJECTIVE BOX
CARDIOLOGY CONSULT NOTE     CHIEF COMPLAINT/REASON FOR CONSULT:    HPI:  58yF asthma former smoker chronic back pain and hx hip replacement p/w chest pain x 1 week, Chest pain is Left chest described as constant at times sharp w radiation down the left arm to the finger tips; 10/10 at its height yet on exam 2/10.  Chest pain associated w nausea x 1 last week. Trial symptom relief w inhaler with minimal relief.  Patient denied palpitations sick contacts SOB fever vomiting numbness/tingling of extremity; no radiation of pain to jaw or neck; no abdominal pain or diarrhea. Strong familial history of MI CHF and CAD in siblings and parents.  Of note, former smoker 30 years, quit x 1 year. In the ED received 1x dose azithromycin & rochephin; 20 mg lasix; troponin; imaging w noted opacities. Discussed case with MD, will admit for chest pain work up.  (2023 21:30)      PAST MEDICAL & SURGICAL HISTORY:  Fibromyalgia      Lyme disease      Back pain, chronic      Polysubstance dependence      Restless leg syndrome      Right hip pain      H/O fracture of rib  10+ yeras ago      Former smoker      Cerebrovascular accident (CVA)  in 2016 and 2018 NO residual      History of appendectomy      History of  section      Fracture, radius      History of gastric surgery          Cardiac Risks:   [ ]HTN, [ ] DM, [ ] Smoking, [ ] FH,  [ ] Lipids   Quit smoking 2 years        MEDICATIONS:  MEDICATIONS  (STANDING):  aspirin  chewable 81 milliGRAM(s) Oral daily  azithromycin   Tablet 500 milliGRAM(s) Oral daily  cefTRIAXone   IVPB 1000 milliGRAM(s) IV Intermittent every 24 hours  enoxaparin Injectable 40 milliGRAM(s) SubCutaneous every 24 hours  famotidine Injectable 20 milliGRAM(s) IV Push every 24 hours  furosemide   Injectable 40 milliGRAM(s) IV Push daily  pregabalin 150 milliGRAM(s) Oral two times a day  regadenoson Injectable 0.4 milliGRAM(s) IV Push once      FAMILY HISTORY:  No pertinent family history in first degree relatives        SOCIAL HISTORY:        Allergies    No Known Allergies        	    REVIEW OF SYSTEMS:  CONSTITUTIONAL: No fever, weight loss, or fatigue  EYES: No eye pain, visual disturbances, or discharge  ENMT:  No difficulty hearing, tinnitus, vertigo; No sinus or throat pain  NECK: No pain or stiffness  RESPIRATORY: No cough, wheezing, chills or hemoptysis; No Shortness of Breath  CARDIOVASCULAR: No chest pain, palpitations, passing out, dizziness, or leg swelling  GASTROINTESTINAL: No abdominal or epigastric pain. No nausea, vomiting, or hematemesis; No diarrhea or constipation. No melena or hematochezia.  GENITOURINARY: No dysuria, frequency, hematuria, or incontinence  NEUROLOGICAL: No headaches, memory loss, loss of strength, numbness, or tremors  SKIN: No itching, burning, rashes, or lesions   	      PHYSICAL EXAM:  T(C): 36.7 (23 @ 08:06), Max: 36.8 (23 @ 20:44)  HR: 81 (23 @ 08:06) (77 - 81)  BP: 144/79 (23 @ 08:06) (144/79 - 171/112)  RR: 18 (23 @ 08:06) (18 - 18)  SpO2: 99% (23 @ 08:06) (98% - 99%)  Wt(kg): --  I&O's Summary      Appearance: Normal	  Psychiatry: A & O x 3, Mood & affect appropriate  HEENT:   Normal oral mucosa, PERRL, EOMI	  Lymphatic: No lymphadenopathy  Cardiovascular: Normal S1 S2,RRR, No JVD, No murmurs  Respiratory: Lungs clear to auscultation	  Gastrointestinal:  Soft, Non-tender, + BS	  Skin: No rashes, No ecchymoses, No cyanosis	  Neurologic: Non-focal  Extremities: Normal range of motion, No clubbing, cyanosis or edema  Vascular: Peripheral pulses palpable 2+ bilaterally      ECG:  	< from: 12 Lead ECG (23 @ 18:13) >  Diagnosis Line    Normal sinus rhythm  Normal ECG    Confirmed by BRYCE RAMOS MD ([id number]) on 2023 7:05:45 AM    < end of copied text >      	  LABS:	 	    CARDIAC MARKERS:                                    12.7   8.32  )-----------( 332      ( 2023 08:18 )             41.2         143  |  104  |  8<L>  ----------------------------<  100<H>  3.8   |  23  |  0.7    Ca    9.7      2023 08:18  Phos  4.1       Mg     1.9         TPro  6.7  /  Alb  3.8  /  TBili  <0.2  /  DBili  x   /  AST  22  /  ALT  11  /  AlkPhos  117<H>

## 2023-07-27 ENCOUNTER — TRANSCRIPTION ENCOUNTER (OUTPATIENT)
Age: 58
End: 2023-07-27

## 2023-07-27 VITALS — DIASTOLIC BLOOD PRESSURE: 68 MMHG | SYSTOLIC BLOOD PRESSURE: 108 MMHG | TEMPERATURE: 97 F | HEART RATE: 69 BPM

## 2023-07-27 DIAGNOSIS — M54.9 DORSALGIA, UNSPECIFIED: ICD-10-CM

## 2023-07-27 LAB
ALBUMIN SERPL ELPH-MCNC: 4.3 G/DL — SIGNIFICANT CHANGE UP (ref 3.5–5.2)
ALP SERPL-CCNC: 127 U/L — HIGH (ref 30–115)
ALT FLD-CCNC: 11 U/L — SIGNIFICANT CHANGE UP (ref 0–41)
ANION GAP SERPL CALC-SCNC: 14 MMOL/L — SIGNIFICANT CHANGE UP (ref 7–14)
AST SERPL-CCNC: 11 U/L — SIGNIFICANT CHANGE UP (ref 0–41)
BASOPHILS # BLD AUTO: 0.07 K/UL — SIGNIFICANT CHANGE UP (ref 0–0.2)
BASOPHILS NFR BLD AUTO: 0.9 % — SIGNIFICANT CHANGE UP (ref 0–1)
BILIRUB SERPL-MCNC: <0.2 MG/DL — SIGNIFICANT CHANGE UP (ref 0.2–1.2)
BUN SERPL-MCNC: 19 MG/DL — SIGNIFICANT CHANGE UP (ref 10–20)
CALCIUM SERPL-MCNC: 9.2 MG/DL — SIGNIFICANT CHANGE UP (ref 8.4–10.5)
CHLORIDE SERPL-SCNC: 105 MMOL/L — SIGNIFICANT CHANGE UP (ref 98–110)
CO2 SERPL-SCNC: 23 MMOL/L — SIGNIFICANT CHANGE UP (ref 17–32)
CREAT SERPL-MCNC: 0.9 MG/DL — SIGNIFICANT CHANGE UP (ref 0.7–1.5)
EGFR: 73 ML/MIN/1.73M2 — SIGNIFICANT CHANGE UP
EOSINOPHIL # BLD AUTO: 0.23 K/UL — SIGNIFICANT CHANGE UP (ref 0–0.7)
EOSINOPHIL NFR BLD AUTO: 3 % — SIGNIFICANT CHANGE UP (ref 0–8)
GLUCOSE SERPL-MCNC: 96 MG/DL — SIGNIFICANT CHANGE UP (ref 70–99)
HCT VFR BLD CALC: 40.2 % — SIGNIFICANT CHANGE UP (ref 37–47)
HGB BLD-MCNC: 12.1 G/DL — SIGNIFICANT CHANGE UP (ref 12–16)
IMM GRANULOCYTES NFR BLD AUTO: 0.5 % — HIGH (ref 0.1–0.3)
LYMPHOCYTES # BLD AUTO: 2.44 K/UL — SIGNIFICANT CHANGE UP (ref 1.2–3.4)
LYMPHOCYTES # BLD AUTO: 31.4 % — SIGNIFICANT CHANGE UP (ref 20.5–51.1)
MCHC RBC-ENTMCNC: 23 PG — LOW (ref 27–31)
MCHC RBC-ENTMCNC: 30.1 G/DL — LOW (ref 32–37)
MCV RBC AUTO: 76.6 FL — LOW (ref 81–99)
MONOCYTES # BLD AUTO: 0.86 K/UL — HIGH (ref 0.1–0.6)
MONOCYTES NFR BLD AUTO: 11.1 % — HIGH (ref 1.7–9.3)
NEUTROPHILS # BLD AUTO: 4.12 K/UL — SIGNIFICANT CHANGE UP (ref 1.4–6.5)
NEUTROPHILS NFR BLD AUTO: 53.1 % — SIGNIFICANT CHANGE UP (ref 42.2–75.2)
NRBC # BLD: 0 /100 WBCS — SIGNIFICANT CHANGE UP (ref 0–0)
PLATELET # BLD AUTO: 312 K/UL — SIGNIFICANT CHANGE UP (ref 130–400)
PMV BLD: 9.6 FL — SIGNIFICANT CHANGE UP (ref 7.4–10.4)
POTASSIUM SERPL-MCNC: 4.7 MMOL/L — SIGNIFICANT CHANGE UP (ref 3.5–5)
POTASSIUM SERPL-SCNC: 4.7 MMOL/L — SIGNIFICANT CHANGE UP (ref 3.5–5)
PROCALCITONIN SERPL-MCNC: 0.06 NG/ML — SIGNIFICANT CHANGE UP (ref 0.02–0.1)
PROT SERPL-MCNC: 7.4 G/DL — SIGNIFICANT CHANGE UP (ref 6–8)
RBC # BLD: 5.25 M/UL — SIGNIFICANT CHANGE UP (ref 4.2–5.4)
RBC # FLD: 19.5 % — HIGH (ref 11.5–14.5)
SODIUM SERPL-SCNC: 142 MMOL/L — SIGNIFICANT CHANGE UP (ref 135–146)
WBC # BLD: 7.76 K/UL — SIGNIFICANT CHANGE UP (ref 4.8–10.8)
WBC # FLD AUTO: 7.76 K/UL — SIGNIFICANT CHANGE UP (ref 4.8–10.8)

## 2023-07-27 PROCEDURE — 93010 ELECTROCARDIOGRAM REPORT: CPT

## 2023-07-27 PROCEDURE — 99239 HOSP IP/OBS DSCHRG MGMT >30: CPT

## 2023-07-27 PROCEDURE — 78452 HT MUSCLE IMAGE SPECT MULT: CPT | Mod: 26

## 2023-07-27 RX ORDER — ATORVASTATIN CALCIUM 80 MG/1
1 TABLET, FILM COATED ORAL
Qty: 30 | Refills: 0
Start: 2023-07-27 | End: 2023-08-25

## 2023-07-27 RX ORDER — FUROSEMIDE 40 MG
1 TABLET ORAL
Qty: 30 | Refills: 0
Start: 2023-07-27 | End: 2023-08-25

## 2023-07-27 RX ORDER — OXYCODONE HYDROCHLORIDE 5 MG/1
1 TABLET ORAL
Refills: 0
Start: 2023-07-27

## 2023-07-27 RX ADMIN — OXYCODONE HYDROCHLORIDE 10 MILLIGRAM(S): 5 TABLET ORAL at 12:06

## 2023-07-27 RX ADMIN — Medication 20 MILLIGRAM(S): at 05:55

## 2023-07-27 RX ADMIN — Medication 650 MILLIGRAM(S): at 11:11

## 2023-07-27 RX ADMIN — Medication 650 MILLIGRAM(S): at 12:00

## 2023-07-27 RX ADMIN — OXYCODONE HYDROCHLORIDE 10 MILLIGRAM(S): 5 TABLET ORAL at 01:15

## 2023-07-27 RX ADMIN — ZOLPIDEM TARTRATE 5 MILLIGRAM(S): 10 TABLET ORAL at 00:36

## 2023-07-27 RX ADMIN — Medication 150 MILLIGRAM(S): at 17:37

## 2023-07-27 RX ADMIN — OXYCODONE HYDROCHLORIDE 10 MILLIGRAM(S): 5 TABLET ORAL at 05:57

## 2023-07-27 RX ADMIN — OXYCODONE HYDROCHLORIDE 10 MILLIGRAM(S): 5 TABLET ORAL at 12:45

## 2023-07-27 RX ADMIN — OXYCODONE HYDROCHLORIDE 10 MILLIGRAM(S): 5 TABLET ORAL at 17:37

## 2023-07-27 RX ADMIN — FAMOTIDINE 20 MILLIGRAM(S): 10 INJECTION INTRAVENOUS at 05:55

## 2023-07-27 RX ADMIN — OXYCODONE HYDROCHLORIDE 10 MILLIGRAM(S): 5 TABLET ORAL at 06:32

## 2023-07-27 RX ADMIN — ZOLPIDEM TARTRATE 5 MILLIGRAM(S): 10 TABLET ORAL at 01:36

## 2023-07-27 RX ADMIN — Medication 150 MILLIGRAM(S): at 05:55

## 2023-07-27 NOTE — DISCHARGE NOTE PROVIDER - HOSPITAL COURSE
58yF asthma former smoker chronic back pain and hx hip replacement p/w chest pain x 1 week, Chest pain is Left chest described as constant at times sharp w radiation down the left arm to the finger tips; 10/10 at its height yet on exam 2/10      #Midsternal Chest Pain   h/o Strong familial cardiac history  MI CAD CHF  # H/O HFpEF  # former smoker, now vapes (noted to vape in ED)  - on room air , euvolemic on exam   - no cough / no fever/ no leukocytosis - will stop abx   - cw telemetry   - pain radiates to left arm and associated with mild nausea which is now resolved   - CXR with interstitial congestion  - EKG no acute ischemic changes, trop neg x2 , pbnp 918  - TTE - 57% EF , mild-mod MVR, mild LA enlargement   - TSH 3.13 /lipid profile noted /A1C  5.4  - intake and output / fluid restriction   - was started on lasix po   - nuclear stress test showed ________  #H/O HTN  SBP 170s on exam in ED   reportedly on no anti HTNs   PRN hydralazine   dash diet   #H/O CVA 2016 2018   no residual weakness   continue asa 81 mg , start statin     #former smoker , now vapes   tobacco use x 30 yrs   quit 1 year ago   noted to be vaping in ED    on cessation     #Insomnia - continue home ambien     #h/o fibromyalgia and chronic back pain - continue home pain management medications     attending attestation:  pt seen and examined on day of dc   reports chest pain improved and wants to go home   labs and vitals reviewed   cardiac stress test showed ______________   58yF asthma former smoker chronic back pain and hx hip replacement p/w chest pain x 1 week, Chest pain is Left chest described as constant at times sharp w radiation down the left arm to the finger tips; 10/10 at its height yet on exam 2/10      #Midsternal Chest Pain   h/o Strong familial cardiac history  MI CAD CHF  # H/O HFpEF  # former smoker, now vapes (noted to vape in ED)  - on room air , euvolemic on exam   - no cough / no fever/ no leukocytosis - will stop abx   - cw telemetry   - pain radiates to left arm and associated with mild nausea which is now resolved   - CXR with interstitial congestion  - EKG no acute ischemic changes, trop neg x2 , pbnp 918  - TTE - 57% EF , mild-mod MVR, mild LA enlargement   - TSH 3.13 /lipid profile noted /A1C  5.4  - intake and output / fluid restriction   - was started on lasix po   - nuclear stress test normal     #H/O HTN  SBP 170s on exam in ED   reportedly on no anti HTNs   PRN hydralazine   dash diet   #H/O CVA 2016 2018   no residual weakness   continue asa 81 mg , started statin     #former smoker , now vapes   tobacco use x 30 yrs   quit 1 year ago   noted to be vaping in ED    on cessation     #Insomnia - continue home ambien     #h/o fibromyalgia and chronic back pain - continue home pain management medications     attending attestation:  pt seen and examined on day of dc   reports chest pain improved and wants to go home   labs and vitals reviewed   cardiac stress test unremarkable   continue with pain management and advised smoking cessation

## 2023-07-27 NOTE — CHART NOTE - NSCHARTNOTEFT_GEN_A_CORE
Pt was complaining of CP and R shoulder pain earlier this AM - same pain pt has been having.    EKG was ordered - NSR @68bpm no ST changes    Pt was asking for pain meds  - RN gave - no further complaints

## 2023-07-27 NOTE — DISCHARGE NOTE PROVIDER - NSDCMRMEDTOKEN_GEN_ALL_CORE_FT
acetaminophen 325 mg oral tablet: 2 tab(s) orally every 6 hours MDD:8  Ambien 10 mg oral tablet: 1 tab(s) orally once a day (at bedtime)  aspirin 81 mg oral delayed release tablet: 1 tab(s) orally 2 times a day MDD:2  atorvastatin 40 mg oral tablet: 1 tab(s) orally once a day (at bedtime)  Lyrica 150 mg oral capsule: 1 cap(s) orally 2 times a day  oxyCODONE 10 mg oral tablet: 1 tab(s) orally every 6 hours, As Needed  Zantac 150 oral tablet: 1 tab(s) orally 2 times a day   acetaminophen 325 mg oral tablet: 2 tab(s) orally every 6 hours MDD:8  Ambien 10 mg oral tablet: 1 tab(s) orally once a day (at bedtime)  aspirin 81 mg oral delayed release tablet: 1 tab(s) orally 2 times a day MDD:2  atorvastatin 40 mg oral tablet: 1 tab(s) orally once a day (at bedtime)  furosemide 20 mg oral tablet: 1 tab(s) orally once a day  Lyrica 150 mg oral capsule: 1 cap(s) orally 2 times a day  oxyCODONE 10 mg oral tablet: 1 tab(s) orally every 6 hours, As Needed  Zantac 150 oral tablet: 1 tab(s) orally 2 times a day

## 2023-07-27 NOTE — DISCHARGE NOTE PROVIDER - CARE PROVIDER_API CALL
Dixon Magana  Cardiovascular Disease  [address] [place] [id number]  Phone: [phone]  Fax: [phone]  Follow Up Time:

## 2023-07-27 NOTE — DISCHARGE NOTE NURSING/CASE MANAGEMENT/SOCIAL WORK - NSDCPEFALRISK_GEN_ALL_CORE
For information on Fall & Injury Prevention, visit: [email or web address] OR  [email or web address] OR  https://www.cdc.gov/steadi/patient.html

## 2023-07-27 NOTE — DISCHARGE NOTE PROVIDER - NSDCCPCAREPLAN_GEN_ALL_CORE_FT
PRINCIPAL DISCHARGE DIAGNOSIS  Diagnosis: Chest pain  Assessment and Plan of Treatment: you had fluid overload possibly in setting of CHF   troponin and ekg was negative for acute ACS  TTE showed diastolic dysfunction  take lasix 20 mg once a day   you did not require oxygen  stress test showed _________  follow up with cardiology outpatient within 2 weeks for further surveillance      SECONDARY DISCHARGE DIAGNOSES  Diagnosis: History of CVA in adulthood  Assessment and Plan of Treatment: continue to take aspirin 81 mg once a day   new medication statin was started for secondary prevention , it was sent to the pharmacy     PRINCIPAL DISCHARGE DIAGNOSIS  Diagnosis: Chest pain  Assessment and Plan of Treatment: you had fluid overload possibly in setting of CHF   troponin and ekg was negative for acute ACS  TTE showed diastolic dysfunction  take lasix 20 mg once a day   you did not require oxygen  stress test negative   follow up with cardiology outpatient within 2 weeks for further surveillance      SECONDARY DISCHARGE DIAGNOSES  Diagnosis: History of CVA in adulthood  Assessment and Plan of Treatment: continue to take aspirin 81 mg once a day   new medication statin was started for secondary prevention , it was sent to the pharmacy

## 2023-07-27 NOTE — DISCHARGE NOTE NURSING/CASE MANAGEMENT/SOCIAL WORK - PATIENT PORTAL LINK FT
You can access the FollowMyHealth Patient Portal offered by [facility] by registering at the following website: [email or web address]. By joining [employer]’s FollowMyHealth portal, you will also be able to view your health information using other applications (apps) compatible with our system.

## 2023-08-02 DIAGNOSIS — Z96.649 PRESENCE OF UNSPECIFIED ARTIFICIAL HIP JOINT: ICD-10-CM

## 2023-08-02 DIAGNOSIS — G47.00 INSOMNIA, UNSPECIFIED: ICD-10-CM

## 2023-08-02 DIAGNOSIS — F17.290 NICOTINE DEPENDENCE, OTHER TOBACCO PRODUCT, UNCOMPLICATED: ICD-10-CM

## 2023-08-02 DIAGNOSIS — Z87.898 PERSONAL HISTORY OF OTHER SPECIFIED CONDITIONS: ICD-10-CM

## 2023-08-02 DIAGNOSIS — M54.9 DORSALGIA, UNSPECIFIED: ICD-10-CM

## 2023-08-02 DIAGNOSIS — Z86.73 PERSONAL HISTORY OF TRANSIENT ISCHEMIC ATTACK (TIA), AND CEREBRAL INFARCTION WITHOUT RESIDUAL DEFICITS: ICD-10-CM

## 2023-08-02 DIAGNOSIS — Z79.82 LONG TERM (CURRENT) USE OF ASPIRIN: ICD-10-CM

## 2023-08-02 DIAGNOSIS — I50.32 CHRONIC DIASTOLIC (CONGESTIVE) HEART FAILURE: ICD-10-CM

## 2023-08-02 DIAGNOSIS — G89.29 OTHER CHRONIC PAIN: ICD-10-CM

## 2023-08-02 DIAGNOSIS — M79.7 FIBROMYALGIA: ICD-10-CM

## 2023-08-02 DIAGNOSIS — R07.9 CHEST PAIN, UNSPECIFIED: ICD-10-CM

## 2023-08-02 DIAGNOSIS — Z82.49 FAMILY HISTORY OF ISCHEMIC HEART DISEASE AND OTHER DISEASES OF THE CIRCULATORY SYSTEM: ICD-10-CM

## 2023-08-02 DIAGNOSIS — I11.0 HYPERTENSIVE HEART DISEASE WITH HEART FAILURE: ICD-10-CM

## 2023-08-02 DIAGNOSIS — G25.81 RESTLESS LEGS SYNDROME: ICD-10-CM

## 2023-08-02 DIAGNOSIS — I34.0 NONRHEUMATIC MITRAL (VALVE) INSUFFICIENCY: ICD-10-CM

## 2023-08-02 DIAGNOSIS — E87.70 FLUID OVERLOAD, UNSPECIFIED: ICD-10-CM

## 2023-08-02 DIAGNOSIS — J45.909 UNSPECIFIED ASTHMA, UNCOMPLICATED: ICD-10-CM

## 2023-08-02 DIAGNOSIS — Z87.81 PERSONAL HISTORY OF (HEALED) TRAUMATIC FRACTURE: ICD-10-CM

## 2023-10-04 NOTE — ED PROVIDER NOTE - WR INTERPRETED BY 1
Consent: The patient's consent was obtained including but not limited to risks of crusting, scabbing, blistering, scarring, darker or lighter pigmentary change, recurrence, incomplete removal and infection.
Include Z78.9 (Other Specified Conditions Influencing Health Status) As An Associated Diagnosis?: No
Medical Necessity Information: It is in your best interest to select a reason for this procedure from the list below. All of these items fulfill various CMS LCD requirements except the new and changing color options.
Medical Necessity Clause: This procedure was medically necessary because the lesions that were treated were:
Show Spray Paint Technique Variable?: Yes
Spray Paint Text: The liquid nitrogen was applied to the skin utilizing a spray paint frosting technique.
Number Of Freeze-Thaw Cycles: 2 freeze-thaw cycles
Detail Level: Simple
Application Tool (Optional): Liquid Nitrogen Sprayer
Post-Care Instructions: I reviewed with the patient in detail post-care instructions. Patient is to wear sunprotection, and avoid picking at any of the treated lesions. Pt may apply Vaseline to crusted or scabbing areas.
Duration Of Freeze Thaw-Cycle (Seconds): 10-15
Klerman, Elyse

## 2023-12-08 NOTE — ED ADULT TRIAGE NOTE - SPO2 (%)
on the discharge service for the patient. I have reviewed and made amendments to the documentation where necessary. 98

## 2024-01-02 ENCOUNTER — OUTPATIENT (OUTPATIENT)
Dept: OUTPATIENT SERVICES | Facility: HOSPITAL | Age: 59
LOS: 1 days | End: 2024-01-02
Payer: MEDICAID

## 2024-01-02 DIAGNOSIS — R07.9 CHEST PAIN, UNSPECIFIED: ICD-10-CM

## 2024-01-02 DIAGNOSIS — S52.90XA UNSPECIFIED FRACTURE OF UNSPECIFIED FOREARM, INITIAL ENCOUNTER FOR CLOSED FRACTURE: Chronic | ICD-10-CM

## 2024-01-02 DIAGNOSIS — Z00.8 ENCOUNTER FOR OTHER GENERAL EXAMINATION: ICD-10-CM

## 2024-01-02 DIAGNOSIS — Z98.891 HISTORY OF UTERINE SCAR FROM PREVIOUS SURGERY: Chronic | ICD-10-CM

## 2024-01-02 DIAGNOSIS — Z98.890 OTHER SPECIFIED POSTPROCEDURAL STATES: Chronic | ICD-10-CM

## 2024-01-02 DIAGNOSIS — Z90.49 ACQUIRED ABSENCE OF OTHER SPECIFIED PARTS OF DIGESTIVE TRACT: Chronic | ICD-10-CM

## 2024-01-02 PROCEDURE — 75574 CT ANGIO HRT W/3D IMAGE: CPT | Mod: 26

## 2024-01-02 PROCEDURE — 75574 CT ANGIO HRT W/3D IMAGE: CPT

## 2024-01-03 DIAGNOSIS — R07.9 CHEST PAIN, UNSPECIFIED: ICD-10-CM

## 2024-07-30 NOTE — PATIENT PROFILE ADULT - HAVE YOU RECENTLY LOST WEIGHT WITHOUT TRYING?
Jose Mahmood (:  1975) is a 48 y.o. male,Established patient, here for evaluation of the following chief complaint(s):  Annual Exam (Wants blood work done) and Other (Has bumps on elbows//Dry spot on the back of leg, more on the R leg/)      Assessment & Plan   ASSESSMENT/PLAN:  Jose was seen today for annual exam and other.    Diagnoses and all orders for this visit:    Well adult exam  -     Lipid Panel; Future  -     Comprehensive Metabolic Panel; Future  -     Hemoglobin A1C; Future  -     Vitamin D 25 Hydroxy; Future  Good diet and exercise  Recheck to see if stable off statin  Prediabetes  -     Hemoglobin A1C; Future    Vitamin D deficiency  -     Vitamin D 25 Hydroxy; Future    Other orders  -     clobetasol (TEMOVATE) 0.05 % ointment; Apply topically 2 times daily 2 weeks on and 2 weeks off  -     triamcinolone (KENALOG) 0.1 % ointment; Apply topically 2 times daily.         No follow-ups on file.         Subjective   SUBJECTIVE/OBJECTIVE:  HPI  Pt is a of 48 y.o. male comes in today with   Chief Complaint   Patient presents with    Annual Exam     Wants blood work done    Other     Has bumps on elbows    Dry spot on the back of leg, more on the R leg       Few skin spots.  Pravastatin caused a lot of joint pain. Resolved 4 days after stopping.  Exercising regularly.  Diet good. Light breakfast and lunch.  Limits red meat and eggs.    Vitals:    24 0827   BP: 126/70   Pulse: 69   SpO2: 98%   Weight: 86.2 kg (190 lb)   Height: 1.778 m (5' 10\")     Past Medical History:Reviewed  Medications:Reviewed.  No Known Allergies   Social hx:Reviewed.  Social History     Tobacco Use   Smoking Status Never   Smokeless Tobacco Never        Review of Systems   Constitutional: Negative.    Respiratory: Negative.     Cardiovascular: Negative.           Objective   Physical Exam  Constitutional:       Appearance: Normal appearance. He is well-developed.   HENT:      Head: Normocephalic and atraumatic.      
No (0)

## 2024-09-21 ENCOUNTER — INPATIENT (INPATIENT)
Facility: HOSPITAL | Age: 59
LOS: 0 days | Discharge: HOME | DRG: 342 | End: 2024-09-22
Attending: INTERNAL MEDICINE | Admitting: INTERNAL MEDICINE
Payer: MEDICAID

## 2024-09-21 VITALS
RESPIRATION RATE: 18 BRPM | DIASTOLIC BLOOD PRESSURE: 63 MMHG | SYSTOLIC BLOOD PRESSURE: 100 MMHG | TEMPERATURE: 98 F | OXYGEN SATURATION: 100 % | HEART RATE: 69 BPM | WEIGHT: 110.01 LBS | HEIGHT: 66 IN

## 2024-09-21 DIAGNOSIS — Z98.891 HISTORY OF UTERINE SCAR FROM PREVIOUS SURGERY: Chronic | ICD-10-CM
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
## 2024-09-21 DIAGNOSIS — S52.90XA UNSPECIFIED FRACTURE OF UNSPECIFIED FOREARM, INITIAL ENCOUNTER FOR CLOSED FRACTURE: Chronic | ICD-10-CM

## 2024-09-21 DIAGNOSIS — Z90.49 ACQUIRED ABSENCE OF OTHER SPECIFIED PARTS OF DIGESTIVE TRACT: Chronic | ICD-10-CM

## 2024-09-21 DIAGNOSIS — Z98.890 OTHER SPECIFIED POSTPROCEDURAL STATES: Chronic | ICD-10-CM

## 2024-09-21 LAB
ALBUMIN SERPL ELPH-MCNC: 3.9 G/DL — SIGNIFICANT CHANGE UP (ref 3.5–5.2)
ALP SERPL-CCNC: 126 U/L — HIGH (ref 30–115)
ALT FLD-CCNC: 12 U/L — SIGNIFICANT CHANGE UP (ref 0–41)
ANION GAP SERPL CALC-SCNC: 12 MMOL/L — SIGNIFICANT CHANGE UP (ref 7–14)
APAP SERPL-MCNC: 14 UG/ML — SIGNIFICANT CHANGE UP (ref 10–30)
APTT BLD: 36.5 SEC — SIGNIFICANT CHANGE UP (ref 27–39.2)
AST SERPL-CCNC: 20 U/L — SIGNIFICANT CHANGE UP (ref 0–41)
BASE EXCESS BLDV CALC-SCNC: -3 MMOL/L — LOW (ref -2–3)
BASOPHILS # BLD AUTO: 0.04 K/UL — SIGNIFICANT CHANGE UP (ref 0–0.2)
BASOPHILS NFR BLD AUTO: 0.8 % — SIGNIFICANT CHANGE UP (ref 0–1)
BILIRUB SERPL-MCNC: <0.2 MG/DL — SIGNIFICANT CHANGE UP (ref 0.2–1.2)
BUN SERPL-MCNC: 10 MG/DL — SIGNIFICANT CHANGE UP (ref 10–20)
CA-I SERPL-SCNC: 1.17 MMOL/L — SIGNIFICANT CHANGE UP (ref 1.15–1.33)
CALCIUM SERPL-MCNC: 9 MG/DL — SIGNIFICANT CHANGE UP (ref 8.4–10.5)
CHLORIDE SERPL-SCNC: 111 MMOL/L — HIGH (ref 98–110)
CO2 SERPL-SCNC: 20 MMOL/L — SIGNIFICANT CHANGE UP (ref 17–32)
CREAT SERPL-MCNC: 0.9 MG/DL — SIGNIFICANT CHANGE UP (ref 0.7–1.5)
EGFR: 73 ML/MIN/1.73M2 — SIGNIFICANT CHANGE UP
EOSINOPHIL # BLD AUTO: 0.1 K/UL — SIGNIFICANT CHANGE UP (ref 0–0.7)
EOSINOPHIL NFR BLD AUTO: 2 % — SIGNIFICANT CHANGE UP (ref 0–8)
ETHANOL SERPL-MCNC: <10 MG/DL — SIGNIFICANT CHANGE UP
GAS PNL BLDV: 142 MMOL/L — SIGNIFICANT CHANGE UP (ref 136–145)
GAS PNL BLDV: SIGNIFICANT CHANGE UP
GLUCOSE SERPL-MCNC: 84 MG/DL — SIGNIFICANT CHANGE UP (ref 70–99)
HCO3 BLDV-SCNC: 23 MMOL/L — SIGNIFICANT CHANGE UP (ref 22–29)
HCT VFR BLD CALC: 37.3 % — SIGNIFICANT CHANGE UP (ref 37–47)
HCT VFR BLDA CALC: 37 % — SIGNIFICANT CHANGE UP (ref 34.5–46.5)
HGB BLD CALC-MCNC: 12.3 G/DL — SIGNIFICANT CHANGE UP (ref 11.7–16.1)
HGB BLD-MCNC: 11.3 G/DL — LOW (ref 12–16)
IMM GRANULOCYTES NFR BLD AUTO: 0.2 % — SIGNIFICANT CHANGE UP (ref 0.1–0.3)
INR BLD: 0.98 RATIO — SIGNIFICANT CHANGE UP (ref 0.65–1.3)
LACTATE BLDV-MCNC: 3.1 MMOL/L — HIGH (ref 0.5–2)
LYMPHOCYTES # BLD AUTO: 1.47 K/UL — SIGNIFICANT CHANGE UP (ref 1.2–3.4)
LYMPHOCYTES # BLD AUTO: 29.2 % — SIGNIFICANT CHANGE UP (ref 20.5–51.1)
MAGNESIUM SERPL-MCNC: 2.2 MG/DL — SIGNIFICANT CHANGE UP (ref 1.8–2.4)
MCHC RBC-ENTMCNC: 25.2 PG — LOW (ref 27–31)
MCHC RBC-ENTMCNC: 30.3 G/DL — LOW (ref 32–37)
MCV RBC AUTO: 83.3 FL — SIGNIFICANT CHANGE UP (ref 81–99)
MONOCYTES # BLD AUTO: 0.43 K/UL — SIGNIFICANT CHANGE UP (ref 0.1–0.6)
MONOCYTES NFR BLD AUTO: 8.5 % — SIGNIFICANT CHANGE UP (ref 1.7–9.3)
NEUTROPHILS # BLD AUTO: 2.99 K/UL — SIGNIFICANT CHANGE UP (ref 1.4–6.5)
NEUTROPHILS NFR BLD AUTO: 59.3 % — SIGNIFICANT CHANGE UP (ref 42.2–75.2)
NRBC # BLD: 0 /100 WBCS — SIGNIFICANT CHANGE UP (ref 0–0)
PCO2 BLDV: 45 MMHG — HIGH (ref 39–42)
PH BLDV: 7.32 — SIGNIFICANT CHANGE UP (ref 7.32–7.43)
PLATELET # BLD AUTO: 221 K/UL — SIGNIFICANT CHANGE UP (ref 130–400)
PMV BLD: 9.6 FL — SIGNIFICANT CHANGE UP (ref 7.4–10.4)
PO2 BLDV: 24 MMHG — LOW (ref 25–45)
POTASSIUM BLDV-SCNC: 4.9 MMOL/L — SIGNIFICANT CHANGE UP (ref 3.5–5.1)
POTASSIUM SERPL-MCNC: 5 MMOL/L — SIGNIFICANT CHANGE UP (ref 3.5–5)
POTASSIUM SERPL-SCNC: 5 MMOL/L — SIGNIFICANT CHANGE UP (ref 3.5–5)
PROT SERPL-MCNC: 6.5 G/DL — SIGNIFICANT CHANGE UP (ref 6–8)
PROTHROM AB SERPL-ACNC: 11.2 SEC — SIGNIFICANT CHANGE UP (ref 9.95–12.87)
RBC # BLD: 4.48 M/UL — SIGNIFICANT CHANGE UP (ref 4.2–5.4)
RBC # FLD: 19 % — HIGH (ref 11.5–14.5)
SALICYLATES SERPL-MCNC: <0.3 MG/DL — LOW (ref 4–30)
SAO2 % BLDV: 33.1 % — LOW (ref 67–88)
SODIUM SERPL-SCNC: 143 MMOL/L — SIGNIFICANT CHANGE UP (ref 135–146)
WBC # BLD: 5.04 K/UL — SIGNIFICANT CHANGE UP (ref 4.8–10.8)
WBC # FLD AUTO: 5.04 K/UL — SIGNIFICANT CHANGE UP (ref 4.8–10.8)

## 2024-09-21 PROCEDURE — 36415 COLL VENOUS BLD VENIPUNCTURE: CPT

## 2024-09-21 PROCEDURE — G0378: CPT

## 2024-09-21 PROCEDURE — 93010 ELECTROCARDIOGRAM REPORT: CPT

## 2024-09-21 PROCEDURE — 99285 EMERGENCY DEPT VISIT HI MDM: CPT

## 2024-09-21 PROCEDURE — 71045 X-RAY EXAM CHEST 1 VIEW: CPT | Mod: 26

## 2024-09-21 PROCEDURE — 99222 1ST HOSP IP/OBS MODERATE 55: CPT

## 2024-09-21 PROCEDURE — 71260 CT THORAX DX C+: CPT | Mod: 26,MC

## 2024-09-21 PROCEDURE — 73030 X-RAY EXAM OF SHOULDER: CPT | Mod: 26,RT

## 2024-09-21 PROCEDURE — 74177 CT ABD & PELVIS W/CONTRAST: CPT | Mod: 26,MC

## 2024-09-21 PROCEDURE — 72125 CT NECK SPINE W/O DYE: CPT | Mod: 26,MC

## 2024-09-21 PROCEDURE — 83605 ASSAY OF LACTIC ACID: CPT

## 2024-09-21 PROCEDURE — 80048 BASIC METABOLIC PNL TOTAL CA: CPT

## 2024-09-21 PROCEDURE — 70450 CT HEAD/BRAIN W/O DYE: CPT | Mod: 26,MC

## 2024-09-21 PROCEDURE — 73060 X-RAY EXAM OF HUMERUS: CPT | Mod: 26,RT

## 2024-09-21 PROCEDURE — 85027 COMPLETE CBC AUTOMATED: CPT

## 2024-09-21 PROCEDURE — 70486 CT MAXILLOFACIAL W/O DYE: CPT | Mod: 26,MC

## 2024-09-21 RX ORDER — ASPIRIN 325 MG
81 TABLET ORAL DAILY
Refills: 0 | Status: DISCONTINUED | OUTPATIENT
Start: 2024-09-21 | End: 2024-09-22

## 2024-09-21 RX ORDER — DIAZEPAM 10 MG/1
10 TABLET ORAL
Refills: 0 | Status: DISCONTINUED | OUTPATIENT
Start: 2024-09-21 | End: 2024-09-22

## 2024-09-21 RX ORDER — ZOLPIDEM TARTRATE 5 MG
5 TABLET ORAL AT BEDTIME
Refills: 0 | Status: DISCONTINUED | OUTPATIENT
Start: 2024-09-21 | End: 2024-09-22

## 2024-09-21 RX ORDER — OXYCODONE HYDROCHLORIDE 30 MG/1
10 TABLET, FILM COATED, EXTENDED RELEASE ORAL THREE TIMES A DAY
Refills: 0 | Status: DISCONTINUED | OUTPATIENT
Start: 2024-09-21 | End: 2024-09-22

## 2024-09-21 RX ORDER — ENOXAPARIN SODIUM 150 MG/ML
40 INJECTION SUBCUTANEOUS EVERY 24 HOURS
Refills: 0 | Status: DISCONTINUED | OUTPATIENT
Start: 2024-09-21 | End: 2024-09-22

## 2024-09-21 RX ORDER — ATORVASTATIN CALCIUM 10 MG/1
40 TABLET, FILM COATED ORAL AT BEDTIME
Refills: 0 | Status: DISCONTINUED | OUTPATIENT
Start: 2024-09-21 | End: 2024-09-22

## 2024-09-21 RX ORDER — ONDANSETRON HCL/PF 4 MG/2 ML
4 VIAL (ML) INJECTION ONCE
Refills: 0 | Status: COMPLETED | OUTPATIENT
Start: 2024-09-21 | End: 2024-09-21

## 2024-09-21 RX ORDER — MAG HYDROX/ALUMINUM HYD/SIMETH 200-200-20
30 SUSPENSION, ORAL (FINAL DOSE FORM) ORAL EVERY 4 HOURS
Refills: 0 | Status: DISCONTINUED | OUTPATIENT
Start: 2024-09-21 | End: 2024-09-22

## 2024-09-21 RX ORDER — ONDANSETRON HCL/PF 4 MG/2 ML
4 VIAL (ML) INJECTION EVERY 8 HOURS
Refills: 0 | Status: DISCONTINUED | OUTPATIENT
Start: 2024-09-21 | End: 2024-09-22

## 2024-09-21 RX ORDER — FUROSEMIDE 10 MG/ML
20 INJECTION INTRAVENOUS DAILY
Refills: 0 | Status: DISCONTINUED | OUTPATIENT
Start: 2024-09-21 | End: 2024-09-22

## 2024-09-21 RX ORDER — SODIUM CHLORIDE IRRIG SOLUTION 0.9 %
1000 SOLUTION, IRRIGATION IRRIGATION
Refills: 0 | Status: DISCONTINUED | OUTPATIENT
Start: 2024-09-21 | End: 2024-09-22

## 2024-09-21 RX ORDER — PREGABALIN 25 MG/1
150 CAPSULE ORAL
Refills: 0 | Status: DISCONTINUED | OUTPATIENT
Start: 2024-09-21 | End: 2024-09-22

## 2024-09-21 RX ORDER — ACETAMINOPHEN 325 MG
650 TABLET ORAL EVERY 6 HOURS
Refills: 0 | Status: DISCONTINUED | OUTPATIENT
Start: 2024-09-21 | End: 2024-09-22

## 2024-09-21 RX ORDER — SODIUM CHLORIDE IRRIG SOLUTION 0.9 %
1000 SOLUTION, IRRIGATION IRRIGATION ONCE
Refills: 0 | Status: COMPLETED | OUTPATIENT
Start: 2024-09-21 | End: 2024-09-21

## 2024-09-21 RX ADMIN — ATORVASTATIN CALCIUM 40 MILLIGRAM(S): 10 TABLET, FILM COATED ORAL at 21:40

## 2024-09-21 RX ADMIN — Medication 1000 MILLILITER(S): at 12:18

## 2024-09-21 RX ADMIN — PREGABALIN 150 MILLIGRAM(S): 25 CAPSULE ORAL at 18:05

## 2024-09-21 RX ADMIN — Medication 4 MILLIGRAM(S): at 14:17

## 2024-09-21 NOTE — H&P ADULT - ASSESSMENT
Patient is a 59-year-old female PMHx of asthma, chronic back pain, fibromyalgia BIBA for abusing pain medications, falling, and having chronic back pain. History obtained by son Kalpesh britton, who reports patient has frequent falls, found his mother at 10 AM this morning on the couch unable to move with slurred speech. Per son, pt has been taking more pain medications than she should, unable to report what pt took this morning. Patient with some bruising around the left eye, admits to b/l shoulder pain and chronic back pain. Pt admits to taking percocet. Pt's son denies pt has fever, chills, SOB/CP, changes in urination/bowel movements.     #Acute avulsion fracture of right humeral head  #Acute thoracic compression deformities   #H/o recurrent falls   #H/o chronic back pain and fibromyalgia  #Opioid/Benzodiazepine misuse   -Admit to inpatient level of care  -CT: 1.  Acute avulsion fracture of the greater tuberosity of the right humeral head. Acute mild to moderate compression deformities of T8-T11 vertebral bodies. Chronic appearing extensive bilateral consolidative opacities with   new cystic changes/bronchiectasis since April 2023.  -C/w lyrica, oxycodone, valium (iStop Reference #[id number])  -Fall precautions   -Pain management consult   -Addiction medicine consult   -Pain control prn   -F/U urine tox     #H/O CVA 2016/ 2018   -C/w statin and ASA     #H/o HFpEF  -C/w lasix   -TTE 7/2023- 57% EF , mild-mod MVR, G1DD    #Insomnia   -C/w home Ambien      #Former smoker, now vapes   - on cessation     Diet: DASH  Activity Ambulate as tolerated   VTE ppx: Lovenox     Above plan discussed with               Patient is a 59-year-old female PMHx of asthma, chronic back pain, fibromyalgia BIBA for abusing pain medications, falling, and having chronic back pain. History obtained by son Kalpesh britton, who reports patient has frequent falls, found his mother at 10 AM this morning on the couch unable to move with slurred speech. Per son, pt has been taking more pain medications than she should, unable to report what pt took this morning. Patient with some bruising around the left eye, admits to b/l shoulder pain and chronic back pain. Pt admits to taking percocet. Pt's son denies pt has fever, chills, SOB/CP, changes in urination/bowel movements.     #Acute avulsion fracture of right humeral head  #Acute thoracic compression deformities   #H/o recurrent falls   #H/o chronic back pain and fibromyalgia  #Opioid/Benzodiazepine misuse   -Admit to inpatient level of care  -CT: 1.  Acute avulsion fracture of the greater tuberosity of the right humeral head. Acute mild to moderate compression deformities of T8-T11 vertebral bodies. Chronic appearing extensive bilateral consolidative opacities with   new cystic changes/bronchiectasis since April 2023.  -C/w lyrica, oxycodone, valium (iStop Reference #[id number])  -Fall precautions   -Pain management consult   -CATCH team consult   -Pain control prn   -F/U urine tox     #H/O CVA 2016/ 2018   -C/w statin and ASA     #H/o HFpEF  -C/w lasix   -TTE 7/2023- 57% EF , mild-mod MVR, G1DD    #Insomnia   -C/w home Ambien      #Former smoker, now vapes   - on cessation     Diet: DASH  Activity Ambulate with assistance   VTE ppx: Lovenox     Above plan discussed with               Patient is a 59-year-old female PMHx of asthma, chronic back pain, fibromyalgia BIBA for abusing pain medications, falling, and having chronic back pain. History obtained by son Kalpesh britton, who reports patient has frequent falls, found his mother at 10 AM this morning on the couch unable to move with slurred speech. Per son, pt has been taking more pain medications than she should, unable to report what pt took this morning. Patient with some bruising around the left eye, admits to b/l shoulder pain and chronic back pain. Pt admits to taking percocet. Pt's son denies pt has fever, chills, SOB/CP, changes in urination/bowel movements.     #Acute avulsion fracture of right humeral head  #Acute thoracic compression deformities   #H/o recurrent falls   #H/o chronic back pain and fibromyalgia  #Opioid/Benzodiazepine misuse   -Admit to inpatient level of care  -CT: 1.  Acute avulsion fracture of the greater tuberosity of the right humeral head. Acute mild to moderate compression deformities of T8-T11 vertebral bodies. Chronic appearing extensive bilateral consolidative opacities with   new cystic changes/bronchiectasis since April 2023.  -C/w lyrica, oxycodone, valium (iStop Reference #[id number])  -Fall precautions   -Pain management consult   -CATCH team consult   -Pain control prn   -F/U urine tox     #H/O CVA 2016/ 2018   -C/w statin and ASA     #H/o HFpEF  -C/w lasix   -TTE 7/2023- 57% EF , mild-mod MVR, G1DD    #Insomnia   -C/w home Ambien      #Former smoker, now vapes   - on cessation     Diet: DASH  Activity Ambulate with assistance   VTE ppx: Lovenox

## 2024-09-21 NOTE — ED ADULT NURSE NOTE - NSFALLUNIVINTERV_ED_ALL_ED
Bed/Stretcher in lowest position, wheels locked, appropriate side rails in place/Call bell, personal items and telephone in reach/Instruct patient to call for assistance before getting out of bed/chair/stretcher/Non-slip footwear applied when patient is off stretcher/[place] to call system/Physically safe environment - no spills, clutter or unnecessary equipment/Purposeful proactive rounding/Room/bathroom lighting operational, light cord in reach

## 2024-09-21 NOTE — PATIENT PROFILE ADULT - FALL HARM RISK - FALLEN IN PAST
Patient has been up on the unit. He has to be called several times to before he responds when he is sleep. He has been up for meals. He has denied any feeling of depression anxiety, SI or HI. He has denied having any hallucination, or paranoid feeling. He states he is waiting for his family to bring him some clothes. He asked if he was getting Claritin D, It was explain that it was [place] Claritin. He has been medication compliant, He has remains safe. He remains on close observation. no No

## 2024-09-21 NOTE — ED PROVIDER NOTE - CARE PLAN
Principal Discharge DX:	Falls  Secondary Diagnosis:	CHI (closed head injury)  Secondary Diagnosis:	Shoulder fracture, right  Secondary Diagnosis:	Thoracic compression fracture   1

## 2024-09-21 NOTE — ED PROVIDER NOTE - CLINICAL SUMMARY MEDICAL DECISION MAKING FREE TEXT BOX
falls - repeated falls at home (?polypharmacy vs substance misuse) - admit for pain control and further eval

## 2024-09-21 NOTE — ED PROVIDER NOTE - PROGRESS NOTE DETAILS
case discussed with neurosurgery at north site ,, No need for transfer north, can stay south for eval and treatement

## 2024-09-21 NOTE — ED PROVIDER NOTE - ATTENDING APP SHARED VISIT CONTRIBUTION OF CARE
Patient is a 59-year-old female who comes for frequent falls.  Family noticed some bruising around the left eye so sent her in for eval.  Patient denies any pain in her body other than chronic back pain.    Exam: Nontender spine, soft nontender abdomen, stable pelvis, regular rate, lungs clear, left eye bruising appears chronic, extraocular movements intact  Plan: Labs, trauma pan scan    Number of diagnoses or management options:  [ ] Referral or consultation made    Complexity of data reviewed:  [ ] Decision made to obtain old record(s) or additional history from the family, caretaker, or other source  [x] Laboratory test(s) ordered and/or reviewed  [ ] Independent interpretation of EKG by Dr. Randy Cook:  [ ] Independent interpretation of Radiology by Dr. Randy Cook:   [ ] I, Randy Cook, had a discussion with    Risk (Management Options):  [ ] High: emergency surgery; monitored drug therapy; controlled parenteral therapy; decision for DNR

## 2024-09-21 NOTE — H&P ADULT - NSHPPHYSICALEXAM_GEN_ALL_CORE
T(C): 36.7 (09-21-24 @ 11:26), Max: 36.7 (09-21-24 @ 11:26)  HR: 67 (09-21-24 @ 15:54) (67 - 69)  BP: 113/62 (09-21-24 @ 15:54) (100/63 - 113/62)  RR: 18 (09-21-24 @ 15:54) (18 - 18)  SpO2: 99% (09-21-24 @ 15:54) (99% - 100%)    PHYSICAL EXAM:  GENERAL: NAD, thin appearing female, intoxicated and confused    HEAD:  Atraumatic, Normocephalic  EYES: PERRL, conjunctiva and sclera clear, left orbital ecchymosis   NECK: Supple  CHEST/LUNG: Clear to auscultation bilaterally; No rales, rhonchi or wheezing  HEART: S1,S2 Regular rate and rhythm; No murmurs, rubs, or gallops  ABDOMEN: Soft, nontender, nondistended, no rebound tenderness;   EXTREMITIES: No calf tenderness or peripheral edema bilaterally   NEUROLOGY: non-focal, motor and sensation intact bilaterally   MSK: +Diffuse mid back/ spinal tenderness, JAMES x4

## 2024-09-21 NOTE — H&P ADULT - NS ATTEND AMEND GEN_ALL_CORE FT
Patient with known chronic pain on prescription opiates presents with concerns for overdose on medications.  Patient is hemodynamically stable, RR >8 and is protecting her airway at this time.  She is AAO x 2, cannot tell us what she took at home, is denying that she took more medication than prescribed.  Denies taking illicit substances though reliability is questionable.  Patient does not require Narcan at this time.  Will send tox screen, monitor and treat symptoms PRN.  Pain management and CATCH consults.

## 2024-09-21 NOTE — ED PROVIDER NOTE - OBJECTIVE STATEMENT
Patient BIBA for abusing meds, falling, back pain, Patient awake and appears intox, admit to taking percocet , C/o back pain,

## 2024-09-21 NOTE — H&P ADULT - HISTORY OF PRESENT ILLNESS
Patient is a 59-year-old female PMHx of asthma, chronic back pain, fibromyalgia BIBA for abusing pain medications, falling, and having chronic back pain. History obtained by son Kalpesh britton, who reports patient has frequent falls, found his mother at 10 AM this morning on the couch unable to move with slurred speech. Per son, pt has been taking more pain medications than she should, unable to report what pt took this morning. Patient with some bruising around the left eye, admits to b/l shoulder pain and chronic back pain. Pt admits to taking percocet. Pt's son denies pt has fever, chills, SOB/CP, changes in urination/bowel movements.

## 2024-09-21 NOTE — H&P ADULT - NSHPLABSRESULTS_GEN_ALL_CORE
11.3   5.04  )-----------( 221      ( 21 Sep 2024 12:30 )             37.3       09-21    143  |  111[H]  |  10  ----------------------------<  84  5.0   |  20  |  0.9    Ca    9.0      21 Sep 2024 12:30  Mg     2.2     09-21    TPro  6.5  /  Alb  3.9  /  TBili  <0.2  /  DBili  x   /  AST  20  /  ALT  12  /  AlkPhos  126[H]  09-21          Urinalysis Basic - ( 21 Sep 2024 12:30 )  Color: x / Appearance: x / SG: x / pH: x  Gluc: 84 mg/dL / Ketone: x  / Bili: x / Urobili: x   Blood: x / Protein: x / Nitrite: x   Leuk Esterase: x / RBC: x / WBC x   Sq Epi: x / Non Sq Epi: x / Bacteria: x    PT/INR - ( 21 Sep 2024 12:30 )   PT: 11.20 sec;   INR: 0.98 ratio    PTT - ( 21 Sep 2024 12:30 )  PTT:36.5 sec     CTAP w/ IV contrast:   IMPRESSION:  1.  Acute avulsion fracture of the greater tuberosity of the right   humeral head head. Acute mild to moderate compression deformities of   T8-T11 vertebral bodies.  2.  No evidence of solid organ or vascular injury in the chest, abdomen   or pelvis.  3.  Chronic appearing extensive bilateral consolidative opacities with   new cystic changes/bronchiectasis since April 2023.    CT HEAD:  No acute intracranial pathology or hemorrhage. No acute calvarial   fracture.    MAXILLOFACIAL BONES:  No evidence of maxillofacial bony fracture. No evidence of soft tissue   injury.    CT CERVICAL SPINE:  No acute fracture or subluxation.  --- End of Report ---

## 2024-09-22 VITALS
OXYGEN SATURATION: 94 % | DIASTOLIC BLOOD PRESSURE: 54 MMHG | HEART RATE: 67 BPM | TEMPERATURE: 97 F | SYSTOLIC BLOOD PRESSURE: 93 MMHG | RESPIRATION RATE: 18 BRPM

## 2024-09-22 LAB
ANION GAP SERPL CALC-SCNC: 10 MMOL/L — SIGNIFICANT CHANGE UP (ref 7–14)
BUN SERPL-MCNC: 6 MG/DL — LOW (ref 10–20)
CALCIUM SERPL-MCNC: 8.5 MG/DL — SIGNIFICANT CHANGE UP (ref 8.4–10.5)
CHLORIDE SERPL-SCNC: 110 MMOL/L — SIGNIFICANT CHANGE UP (ref 98–110)
CO2 SERPL-SCNC: 22 MMOL/L — SIGNIFICANT CHANGE UP (ref 17–32)
CREAT SERPL-MCNC: 0.9 MG/DL — SIGNIFICANT CHANGE UP (ref 0.7–1.5)
EGFR: 73 ML/MIN/1.73M2 — SIGNIFICANT CHANGE UP
GLUCOSE SERPL-MCNC: 79 MG/DL — SIGNIFICANT CHANGE UP (ref 70–99)
HCT VFR BLD CALC: 33.6 % — LOW (ref 37–47)
HGB BLD-MCNC: 10.1 G/DL — LOW (ref 12–16)
LACTATE SERPL-SCNC: 0.8 MMOL/L — SIGNIFICANT CHANGE UP (ref 0.7–2)
MCHC RBC-ENTMCNC: 25.3 PG — LOW (ref 27–31)
MCHC RBC-ENTMCNC: 30.1 G/DL — LOW (ref 32–37)
MCV RBC AUTO: 84 FL — SIGNIFICANT CHANGE UP (ref 81–99)
NRBC # BLD: 0 /100 WBCS — SIGNIFICANT CHANGE UP (ref 0–0)
PLATELET # BLD AUTO: 206 K/UL — SIGNIFICANT CHANGE UP (ref 130–400)
PMV BLD: 9.8 FL — SIGNIFICANT CHANGE UP (ref 7.4–10.4)
POTASSIUM SERPL-MCNC: 4.8 MMOL/L — SIGNIFICANT CHANGE UP (ref 3.5–5)
POTASSIUM SERPL-SCNC: 4.8 MMOL/L — SIGNIFICANT CHANGE UP (ref 3.5–5)
RBC # BLD: 4 M/UL — LOW (ref 4.2–5.4)
RBC # FLD: 19.2 % — HIGH (ref 11.5–14.5)
SODIUM SERPL-SCNC: 142 MMOL/L — SIGNIFICANT CHANGE UP (ref 135–146)
WBC # BLD: 6.74 K/UL — SIGNIFICANT CHANGE UP (ref 4.8–10.8)
WBC # FLD AUTO: 6.74 K/UL — SIGNIFICANT CHANGE UP (ref 4.8–10.8)

## 2024-09-22 PROCEDURE — 99239 HOSP IP/OBS DSCHRG MGMT >30: CPT

## 2024-09-22 RX ORDER — DIAZEPAM 10 MG/1
1 TABLET ORAL
Qty: 4 | Refills: 0
Start: 2024-09-22 | End: 2024-09-23

## 2024-09-22 RX ORDER — NALOXONE HYDROCHLORIDE 0.4 MG/ML
1 INJECTION, SOLUTION INTRAMUSCULAR; INTRAVENOUS; SUBCUTANEOUS
Qty: 1 | Refills: 0
Start: 2024-09-22 | End: 2024-09-22

## 2024-09-22 RX ORDER — PREGABALIN 25 MG/1
1 CAPSULE ORAL
Qty: 4 | Refills: 0
Start: 2024-09-22 | End: 2024-09-23

## 2024-09-22 RX ORDER — OXYCODONE HYDROCHLORIDE 30 MG/1
1 TABLET, FILM COATED, EXTENDED RELEASE ORAL
Qty: 6 | Refills: 0
Start: 2024-09-22 | End: 2024-09-23

## 2024-09-22 RX ORDER — ZOLPIDEM TARTRATE 5 MG
1 TABLET ORAL
Qty: 2 | Refills: 0
Start: 2024-09-22 | End: 2024-09-23

## 2024-09-22 RX ORDER — DIAZEPAM 10 MG/1
1 TABLET ORAL
Refills: 0 | DISCHARGE

## 2024-09-22 RX ADMIN — PREGABALIN 150 MILLIGRAM(S): 25 CAPSULE ORAL at 05:20

## 2024-09-22 RX ADMIN — Medication 81 MILLIGRAM(S): at 11:25

## 2024-09-22 RX ADMIN — FUROSEMIDE 20 MILLIGRAM(S): 10 INJECTION INTRAVENOUS at 05:20

## 2024-09-22 RX ADMIN — ENOXAPARIN SODIUM 40 MILLIGRAM(S): 150 INJECTION SUBCUTANEOUS at 05:20

## 2024-09-22 NOTE — DISCHARGE NOTE NURSING/CASE MANAGEMENT/SOCIAL WORK - PATIENT PORTAL LINK FT
You can access the FollowMyHealth Patient Portal offered by [facility] by registering at the following website: [email or web address]. By joining [employer]’s FollowMyHealth portal, you will also be able to view your health information using other applications (apps) compatible with our system.
80
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
## 2024-09-22 NOTE — DISCHARGE NOTE NURSING/CASE MANAGEMENT/SOCIAL WORK - NSDCPEFALRISK_GEN_ALL_CORE
For information on Fall & Injury Prevention, visit: [email or web address] OR  [email or web address] OR  https://www.cdc.gov/steadi/patient.html

## 2024-09-22 NOTE — DISCHARGE NOTE PROVIDER - NSDCCPCAREPLAN_GEN_ALL_CORE_FT
PRINCIPAL DISCHARGE DIAGNOSIS  Diagnosis: Opiate misuse  Assessment and Plan of Treatment: Do not take any more medication than you are prescribed.  It can be dangerous to take more medications than you need.  Follow with your pain management provider for a refill of your medications.      SECONDARY DISCHARGE DIAGNOSES  Diagnosis: Shoulder fracture, right  Assessment and Plan of Treatment: You have a small break from the fall at home.  Surgery is not required, and you will heal with time and physical therapy as an outpatient.  You can take the prescription to any physical therapy office and make an appointment.    Diagnosis: Thoracic compression fracture  Assessment and Plan of Treatment: You have small fractures in your spine which do not need surgery or further intervention.  Follow with physical therapy.

## 2024-09-22 NOTE — DISCHARGE NOTE PROVIDER - NSDCMRMEDTOKEN_GEN_ALL_CORE_FT
acetaminophen 325 mg oral tablet: 2 tab(s) orally every 6 hours MDD:8  Ambien 10 mg oral tablet: 1 tab(s) orally once a day (at bedtime) MDD: 1 tablet  aspirin 81 mg oral delayed release tablet: 1 tab(s) orally 2 times a day MDD:2  atorvastatin 40 mg oral tablet: 1 tab(s) orally once a day (at bedtime)  diazePAM 10 mg oral tablet: 1 tab(s) orally 2 times a day MDD: 2 tablets  furosemide 20 mg oral tablet: 1 tab(s) orally once a day  Lyrica 150 mg oral capsule: 1 cap(s) orally 2 times a day MDD: 2 tablets  oxyCODONE 10 mg oral tablet: 1 tab(s) orally 3 times a day as needed for  severe pain MDD: 3 tablets  Zantac 150 oral tablet: 1 tab(s) orally 2 times a day   acetaminophen 325 mg oral tablet: 2 tab(s) orally every 6 hours MDD:8  Ambien 10 mg oral tablet: 1 tab(s) orally once a day (at bedtime) MDD: 1 tablet  aspirin 81 mg oral delayed release tablet: 1 tab(s) orally 2 times a day MDD:2  atorvastatin 40 mg oral tablet: 1 tab(s) orally once a day (at bedtime)  diazePAM 10 mg oral tablet: 1 tab(s) orally 2 times a day MDD: 2 tablets  furosemide 20 mg oral tablet: 1 tab(s) orally once a day  Lyrica 150 mg oral capsule: 1 cap(s) orally 2 times a day MDD: 2 tablets  Narcan 4 mg/0.1 mL nasal spray: 1 spray(s) intranasally once PRN for opiate overdose  oxyCODONE 10 mg oral tablet: 1 tab(s) orally 3 times a day as needed for  severe pain MDD: 3 tablets  Zantac 150 oral tablet: 1 tab(s) orally 2 times a day

## 2024-09-22 NOTE — DISCHARGE NOTE PROVIDER - CARE PROVIDER_API CALL
Bony Livingston  Family Medicine  [address] [place] [address]  Phone: [phone]  Fax: [phone]  Follow Up Time:
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
## 2024-09-27 DIAGNOSIS — F19.99 OTHER PSYCHOACTIVE SUBSTANCE USE, UNSPECIFIED WITH UNSPECIFIED PSYCHOACTIVE SUBSTANCE-INDUCED DISORDER: ICD-10-CM

## 2024-09-27 DIAGNOSIS — G47.00 INSOMNIA, UNSPECIFIED: ICD-10-CM

## 2024-09-27 DIAGNOSIS — Z79.82 LONG TERM (CURRENT) USE OF ASPIRIN: ICD-10-CM
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
## 2024-09-27 DIAGNOSIS — M79.7 FIBROMYALGIA: ICD-10-CM
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
## 2024-09-27 DIAGNOSIS — J47.9 BRONCHIECTASIS, UNCOMPLICATED: ICD-10-CM

## 2024-09-27 DIAGNOSIS — I50.30 UNSPECIFIED DIASTOLIC (CONGESTIVE) HEART FAILURE: ICD-10-CM

## 2024-09-27 DIAGNOSIS — Z86.73 PERSONAL HISTORY OF TRANSIENT ISCHEMIC ATTACK (TIA), AND CEREBRAL INFARCTION WITHOUT RESIDUAL DEFICITS: ICD-10-CM

## 2024-09-27 DIAGNOSIS — Z98.84 BARIATRIC SURGERY STATUS: ICD-10-CM

## 2024-09-27 DIAGNOSIS — G89.29 OTHER CHRONIC PAIN: ICD-10-CM

## 2024-09-27 DIAGNOSIS — Z79.899 OTHER LONG TERM (CURRENT) DRUG THERAPY: ICD-10-CM

## 2024-09-27 DIAGNOSIS — J45.909 UNSPECIFIED ASTHMA, UNCOMPLICATED: ICD-10-CM

## 2024-09-27 DIAGNOSIS — M48.54XA COLLAPSED VERTEBRA, NOT ELSEWHERE CLASSIFIED, THORACIC REGION, INITIAL ENCOUNTER FOR FRACTURE: ICD-10-CM

## 2024-09-27 DIAGNOSIS — R29.6 REPEATED FALLS: ICD-10-CM

## 2024-09-27 DIAGNOSIS — Z87.891 PERSONAL HISTORY OF NICOTINE DEPENDENCE: ICD-10-CM

## 2024-10-02 ENCOUNTER — APPOINTMENT (OUTPATIENT)
Dept: ORTHOPEDIC SURGERY | Facility: CLINIC | Age: 59
End: 2024-10-02
Payer: MEDICAID

## 2024-10-02 DIAGNOSIS — S22.000S WEDGE COMPRESSION FRACTURE OF UNSPECIFIED THORACIC VERTEBRA, SEQUELA: ICD-10-CM

## 2024-10-02 PROCEDURE — 99203 OFFICE O/P NEW LOW 30 MIN: CPT

## 2024-10-02 NOTE — IMAGING
[de-identified] : TTP midline spine and paraspinal musculature  Strength                                          Hip flexor   Right: 5/5; Left: 5/5                              Knee extensor     Right: 5/5; Left: 5/5                      Ankle dorsiflexion   Right: 5/5; Left: 5/5                   EHL           Right: 5/5; Left: 5/5                                 Ankle plantarflexion       Right: 5/5; Left: 5/5  Sensation L1   Right: 2/2; Left: 2/2 L2   Right: 2/2; Left: 2/2 L3   Right: 2/2; Left: 2/2 L4   Right: 2/2; Left: 2/2 L5   Right: 2/2; Left: 2/2 S1   Right: 2/2; Left: 2/2  Reflexes Patella   Right: 2+; Left 2+ Achilles   Right: 2+; Left 2+ Clonus  Right: absent; L: absent

## 2024-10-02 NOTE — DISCUSSION/SUMMARY
[de-identified] : 59-year-old female with compression deformities in thoracic spine.  I am ordering an MRI of the thoracic spine.  This will allow me to see the chronicity of her fractures and help determine if she is a candidate for a kyphoplasty.

## 2024-10-02 NOTE — DATA REVIEWED
[FreeTextEntry1] : I reviewed the patient CT scan of her abdomen done at [facility] which went to the emergency room.  There is a hard lumbar thoracic age-indeterminate compression deformities.

## 2024-10-02 NOTE — HISTORY OF PRESENT ILLNESS
[de-identified] : 59-year-old female with a fall.  She has compression fractures in her thoracic spine she was told.  She does not have an MRI.  No loss of bladder or bowel.  She states that the fall was a year ago but then she had a worsening of her pain in September.

## 2024-10-09 ENCOUNTER — APPOINTMENT (OUTPATIENT)
Dept: MRI IMAGING | Facility: CLINIC | Age: 59
End: 2024-10-09

## 2024-10-21 ENCOUNTER — APPOINTMENT (OUTPATIENT)
Dept: ORTHOPEDIC SURGERY | Facility: CLINIC | Age: 59
End: 2024-10-21

## 2024-12-02 NOTE — PHYSICAL THERAPY INITIAL EVALUATION ADULT - LEVEL OF CONSCIOUSNESS, REHAB EVAL
Pt last seen on 12/18/23. Due to return in one year.    Has appt on 12/15/24.    Rx last filled on 12/18/23 #90/3RF.    Will forward to MD for refill.   alert

## 2024-12-14 ENCOUNTER — EMERGENCY (EMERGENCY)
Facility: HOSPITAL | Age: 59
LOS: 0 days | Discharge: HOME | End: 2024-12-15
Attending: EMERGENCY MEDICINE
Payer: MEDICAID

## 2024-12-14 VITALS
OXYGEN SATURATION: 99 % | WEIGHT: 149.91 LBS | TEMPERATURE: 98 F | HEART RATE: 64 BPM | SYSTOLIC BLOOD PRESSURE: 120 MMHG | RESPIRATION RATE: 20 BRPM | DIASTOLIC BLOOD PRESSURE: 76 MMHG | HEIGHT: 70 IN

## 2024-12-14 DIAGNOSIS — R00.1 BRADYCARDIA, UNSPECIFIED: ICD-10-CM

## 2024-12-14 DIAGNOSIS — S52.90XA UNSPECIFIED FRACTURE OF UNSPECIFIED FOREARM, INITIAL ENCOUNTER FOR CLOSED FRACTURE: Chronic | ICD-10-CM

## 2024-12-14 DIAGNOSIS — Z98.890 OTHER SPECIFIED POSTPROCEDURAL STATES: Chronic | ICD-10-CM

## 2024-12-14 DIAGNOSIS — G89.29 OTHER CHRONIC PAIN: ICD-10-CM

## 2024-12-14 DIAGNOSIS — Z90.49 ACQUIRED ABSENCE OF OTHER SPECIFIED PARTS OF DIGESTIVE TRACT: Chronic | ICD-10-CM

## 2024-12-14 DIAGNOSIS — Z98.891 HISTORY OF UTERINE SCAR FROM PREVIOUS SURGERY: Chronic | ICD-10-CM

## 2024-12-14 LAB
ALBUMIN SERPL ELPH-MCNC: 3.9 G/DL — SIGNIFICANT CHANGE UP (ref 3.5–5.2)
ALP SERPL-CCNC: 119 U/L — HIGH (ref 30–115)
ALT FLD-CCNC: 8 U/L — SIGNIFICANT CHANGE UP (ref 0–41)
AMMONIA BLD-MCNC: 14 UMOL/L — SIGNIFICANT CHANGE UP (ref 11–55)
ANION GAP SERPL CALC-SCNC: 13 MMOL/L — SIGNIFICANT CHANGE UP (ref 7–14)
APAP SERPL-MCNC: <5 UG/ML — LOW (ref 10–30)
AST SERPL-CCNC: 16 U/L — SIGNIFICANT CHANGE UP (ref 0–41)
BASOPHILS # BLD AUTO: 0.05 K/UL — SIGNIFICANT CHANGE UP (ref 0–0.2)
BASOPHILS NFR BLD AUTO: 0.8 % — SIGNIFICANT CHANGE UP (ref 0–1)
BILIRUB SERPL-MCNC: <0.2 MG/DL — SIGNIFICANT CHANGE UP (ref 0.2–1.2)
BUN SERPL-MCNC: 10 MG/DL — SIGNIFICANT CHANGE UP (ref 10–20)
CALCIUM SERPL-MCNC: 9 MG/DL — SIGNIFICANT CHANGE UP (ref 8.4–10.5)
CHLORIDE SERPL-SCNC: 112 MMOL/L — HIGH (ref 98–110)
CO2 SERPL-SCNC: 18 MMOL/L — SIGNIFICANT CHANGE UP (ref 17–32)
CREAT SERPL-MCNC: 0.8 MG/DL — SIGNIFICANT CHANGE UP (ref 0.7–1.5)
EGFR: 84 ML/MIN/1.73M2 — SIGNIFICANT CHANGE UP
EGFR: 84 ML/MIN/1.73M2 — SIGNIFICANT CHANGE UP
EOSINOPHIL # BLD AUTO: 0.16 K/UL — SIGNIFICANT CHANGE UP (ref 0–0.7)
EOSINOPHIL NFR BLD AUTO: 2.7 % — SIGNIFICANT CHANGE UP (ref 0–8)
ETHANOL SERPL-MCNC: <10 MG/DL — SIGNIFICANT CHANGE UP
GLUCOSE SERPL-MCNC: 91 MG/DL — SIGNIFICANT CHANGE UP (ref 70–99)
HCT VFR BLD CALC: 35.9 % — LOW (ref 37–47)
HGB BLD-MCNC: 11.3 G/DL — LOW (ref 12–16)
IMM GRANULOCYTES NFR BLD AUTO: 0.3 % — SIGNIFICANT CHANGE UP (ref 0.1–0.3)
LYMPHOCYTES # BLD AUTO: 2.02 K/UL — SIGNIFICANT CHANGE UP (ref 1.2–3.4)
LYMPHOCYTES # BLD AUTO: 34 % — SIGNIFICANT CHANGE UP (ref 20.5–51.1)
MCHC RBC-ENTMCNC: 26.3 PG — LOW (ref 27–31)
MCHC RBC-ENTMCNC: 31.5 G/DL — LOW (ref 32–37)
MCV RBC AUTO: 83.7 FL — SIGNIFICANT CHANGE UP (ref 81–99)
MONOCYTES # BLD AUTO: 0.44 K/UL — SIGNIFICANT CHANGE UP (ref 0.1–0.6)
MONOCYTES NFR BLD AUTO: 7.4 % — SIGNIFICANT CHANGE UP (ref 1.7–9.3)
NEUTROPHILS # BLD AUTO: 3.25 K/UL — SIGNIFICANT CHANGE UP (ref 1.4–6.5)
NEUTROPHILS NFR BLD AUTO: 54.8 % — SIGNIFICANT CHANGE UP (ref 42.2–75.2)
NRBC # BLD: 0 /100 WBCS — SIGNIFICANT CHANGE UP (ref 0–0)
NRBC BLD-RTO: 0 /100 WBCS — SIGNIFICANT CHANGE UP (ref 0–0)
PLATELET # BLD AUTO: 308 K/UL — SIGNIFICANT CHANGE UP (ref 130–400)
PMV BLD: 9.6 FL — SIGNIFICANT CHANGE UP (ref 7.4–10.4)
POTASSIUM SERPL-MCNC: 3.7 MMOL/L — SIGNIFICANT CHANGE UP (ref 3.5–5)
POTASSIUM SERPL-SCNC: 3.7 MMOL/L — SIGNIFICANT CHANGE UP (ref 3.5–5)
PROT SERPL-MCNC: 6.5 G/DL — SIGNIFICANT CHANGE UP (ref 6–8)
RBC # BLD: 4.29 M/UL — SIGNIFICANT CHANGE UP (ref 4.2–5.4)
RBC # FLD: 16.5 % — HIGH (ref 11.5–14.5)
SALICYLATES SERPL-MCNC: <0.3 MG/DL — LOW (ref 4–30)
SODIUM SERPL-SCNC: 143 MMOL/L — SIGNIFICANT CHANGE UP (ref 135–146)
WBC # BLD: 5.94 K/UL — SIGNIFICANT CHANGE UP (ref 4.8–10.8)
WBC # FLD AUTO: 5.94 K/UL — SIGNIFICANT CHANGE UP (ref 4.8–10.8)

## 2024-12-14 PROCEDURE — 99283 EMERGENCY DEPT VISIT LOW MDM: CPT | Mod: 25

## 2024-12-14 PROCEDURE — 0241U: CPT

## 2024-12-14 PROCEDURE — 85025 COMPLETE CBC W/AUTO DIFF WBC: CPT

## 2024-12-14 PROCEDURE — 36415 COLL VENOUS BLD VENIPUNCTURE: CPT

## 2024-12-14 PROCEDURE — 82140 ASSAY OF AMMONIA: CPT

## 2024-12-14 PROCEDURE — 80345 DRUG SCREENING BARBITURATES: CPT

## 2024-12-14 PROCEDURE — 80053 COMPREHEN METABOLIC PANEL: CPT

## 2024-12-14 PROCEDURE — 99285 EMERGENCY DEPT VISIT HI MDM: CPT

## 2024-12-14 PROCEDURE — 80307 DRUG TEST PRSMV CHEM ANLYZR: CPT

## 2024-12-14 PROCEDURE — 93010 ELECTROCARDIOGRAM REPORT: CPT

## 2024-12-14 PROCEDURE — 93005 ELECTROCARDIOGRAM TRACING: CPT

## 2024-12-14 RX ORDER — HALOPERIDOL 10 MG/1
5 TABLET ORAL ONCE
Refills: 0 | Status: DISCONTINUED | OUTPATIENT
Start: 2024-12-14 | End: 2024-12-14

## 2024-12-14 RX ORDER — LORAZEPAM 4 MG/ML
2 VIAL (ML) INJECTION ONCE
Refills: 0 | Status: DISCONTINUED | OUTPATIENT
Start: 2024-12-14 | End: 2024-12-14

## 2024-12-14 RX ADMIN — Medication 1000 MILLILITER(S): at 23:30

## 2024-12-15 VITALS
SYSTOLIC BLOOD PRESSURE: 118 MMHG | OXYGEN SATURATION: 100 % | DIASTOLIC BLOOD PRESSURE: 76 MMHG | RESPIRATION RATE: 17 BRPM | HEART RATE: 68 BPM

## 2024-12-15 LAB
FLUAV AG NPH QL: SIGNIFICANT CHANGE UP
FLUBV AG NPH QL: SIGNIFICANT CHANGE UP
RSV RNA NPH QL NAA+NON-PROBE: SIGNIFICANT CHANGE UP
SARS-COV-2 RNA SPEC QL NAA+PROBE: SIGNIFICANT CHANGE UP

## 2024-12-19 LAB
AMOBARBITAL SERPL-MCNC: <1 MG/L — LOW (ref 3–12)
BUTABARBITAL SERPL-MCNC: <1 MG/L — LOW (ref 2–14)
BUTALBITAL SERPL-MCNC: 13.6 MG/L — HIGH (ref 1–5)
PENTOBARBITAL [MASS/VOLUME] IN BODY FLUID: <1 MG/L — LOW (ref 1–5)
PHENOBARB SERPL-MCNC: <1 MG/L — LOW (ref 15–40)
SECOBARBITAL SERPL-MCNC: <1 MG/L — LOW (ref 1–5)

## 2024-12-23 NOTE — CONSULT NOTE ADULT - REASON FOR ADMISSION
Last office visit: 10/08/2024    Next office visit: 02/04/2025    Last refill lisdexamfetamine (VYVANSE) 10 MG chewable tablet on 11/01/2024 for a 30 day supply per PDMP  
lethargy

## 2025-01-09 NOTE — ED PROVIDER NOTE - CLINICAL SUMMARY MEDICAL DECISION MAKING FREE TEXT BOX
Here for hormone therapy injection, no complaints at this time, Injection given as ordered, tolerated well, no report of pain prior to or after injection. Return to clinic as scheduled.     Site - LB    Testosterone  50 mg    Clinic Supplied Medication    
58yF p/w CP and LUE pain, worsened today, worse w/ exertion.  Pt well appearing, hemodynamically stable and comfortable at rest w/o resp distress.  EKG w/o ischemia or arrhythmia.  CXR with b/l opacities, worse compared to prior especially on the L side.  Labs w/ normal electrolytes, blood counts and renal function, neg trop x 1 but mildly elevated BNP.  Pt hypertensive compared to her usual.  Presentation suggestive of possible pulm vasc congestion, less likely PNA, though will treat with abx in addition to lasix.  No clinical concern for sepsis.  Will adm for further care.

## 2025-04-12 ENCOUNTER — INPATIENT (INPATIENT)
Facility: HOSPITAL | Age: 60
LOS: 11 days | Discharge: HOME | DRG: 753 | End: 2025-04-24
Attending: PSYCHIATRY & NEUROLOGY | Admitting: PSYCHIATRY & NEUROLOGY
Payer: MEDICAID

## 2025-04-12 VITALS
OXYGEN SATURATION: 100 % | WEIGHT: 119.93 LBS | TEMPERATURE: 98 F | HEIGHT: 63 IN | SYSTOLIC BLOOD PRESSURE: 128 MMHG | HEART RATE: 52 BPM | DIASTOLIC BLOOD PRESSURE: 94 MMHG | RESPIRATION RATE: 17 BRPM

## 2025-04-12 DIAGNOSIS — T40.2X1A POISONING BY OTHER OPIOIDS, ACCIDENTAL (UNINTENTIONAL), INITIAL ENCOUNTER: ICD-10-CM

## 2025-04-12 DIAGNOSIS — T50.901A POISONING BY UNSPECIFIED DRUGS, MEDICAMENTS AND BIOLOGICAL SUBSTANCES, ACCIDENTAL (UNINTENTIONAL), INITIAL ENCOUNTER: ICD-10-CM

## 2025-04-12 DIAGNOSIS — S52.90XA UNSPECIFIED FRACTURE OF UNSPECIFIED FOREARM, INITIAL ENCOUNTER FOR CLOSED FRACTURE: Chronic | ICD-10-CM

## 2025-04-12 DIAGNOSIS — Z98.891 HISTORY OF UTERINE SCAR FROM PREVIOUS SURGERY: Chronic | ICD-10-CM

## 2025-04-12 DIAGNOSIS — Z90.49 ACQUIRED ABSENCE OF OTHER SPECIFIED PARTS OF DIGESTIVE TRACT: Chronic | ICD-10-CM

## 2025-04-12 DIAGNOSIS — F32.9 MAJOR DEPRESSIVE DISORDER, SINGLE EPISODE, UNSPECIFIED: ICD-10-CM

## 2025-04-12 DIAGNOSIS — Z98.890 OTHER SPECIFIED POSTPROCEDURAL STATES: Chronic | ICD-10-CM

## 2025-04-12 LAB
ALBUMIN SERPL ELPH-MCNC: 4 G/DL — SIGNIFICANT CHANGE UP (ref 3.5–5.2)
ALP SERPL-CCNC: 125 U/L — HIGH (ref 30–115)
ALT FLD-CCNC: 8 U/L — SIGNIFICANT CHANGE UP (ref 0–41)
ANION GAP SERPL CALC-SCNC: 15 MMOL/L — HIGH (ref 7–14)
APAP SERPL-MCNC: 11 UG/ML — SIGNIFICANT CHANGE UP (ref 10–30)
APAP SERPL-MCNC: <5 UG/ML — LOW (ref 10–30)
AST SERPL-CCNC: 19 U/L — SIGNIFICANT CHANGE UP (ref 0–41)
BASOPHILS # BLD AUTO: 0.06 K/UL — SIGNIFICANT CHANGE UP (ref 0–0.2)
BASOPHILS NFR BLD AUTO: 0.7 % — SIGNIFICANT CHANGE UP (ref 0–1)
BILIRUB DIRECT SERPL-MCNC: <0.2 MG/DL — SIGNIFICANT CHANGE UP (ref 0–0.3)
BILIRUB INDIRECT FLD-MCNC: >0 MG/DL — LOW (ref 0.2–1.2)
BILIRUB SERPL-MCNC: 0.2 MG/DL — SIGNIFICANT CHANGE UP (ref 0.2–1.2)
BUN SERPL-MCNC: 22 MG/DL — HIGH (ref 10–20)
CALCIUM SERPL-MCNC: 9.4 MG/DL — SIGNIFICANT CHANGE UP (ref 8.4–10.5)
CHLORIDE SERPL-SCNC: 106 MMOL/L — SIGNIFICANT CHANGE UP (ref 98–110)
CO2 SERPL-SCNC: 21 MMOL/L — SIGNIFICANT CHANGE UP (ref 17–32)
CREAT SERPL-MCNC: 1.1 MG/DL — SIGNIFICANT CHANGE UP (ref 0.7–1.5)
EGFR: 57 ML/MIN/1.73M2 — LOW
EGFR: 57 ML/MIN/1.73M2 — LOW
EOSINOPHIL # BLD AUTO: 0.04 K/UL — SIGNIFICANT CHANGE UP (ref 0–0.7)
EOSINOPHIL NFR BLD AUTO: 0.5 % — SIGNIFICANT CHANGE UP (ref 0–8)
ETHANOL SERPL-MCNC: <10 MG/DL — SIGNIFICANT CHANGE UP
GLUCOSE BLDC GLUCOMTR-MCNC: 87 MG/DL — SIGNIFICANT CHANGE UP (ref 70–99)
GLUCOSE SERPL-MCNC: 95 MG/DL — SIGNIFICANT CHANGE UP (ref 70–99)
HCT VFR BLD CALC: 43.8 % — SIGNIFICANT CHANGE UP (ref 37–47)
HGB BLD-MCNC: 13.3 G/DL — SIGNIFICANT CHANGE UP (ref 12–16)
IMM GRANULOCYTES NFR BLD AUTO: 0.4 % — HIGH (ref 0.1–0.3)
LYMPHOCYTES # BLD AUTO: 0.99 K/UL — LOW (ref 1.2–3.4)
LYMPHOCYTES # BLD AUTO: 11.8 % — LOW (ref 20.5–51.1)
MCHC RBC-ENTMCNC: 25.2 PG — LOW (ref 27–31)
MCHC RBC-ENTMCNC: 30.4 G/DL — LOW (ref 32–37)
MCV RBC AUTO: 83 FL — SIGNIFICANT CHANGE UP (ref 81–99)
MONOCYTES # BLD AUTO: 0.37 K/UL — SIGNIFICANT CHANGE UP (ref 0.1–0.6)
MONOCYTES NFR BLD AUTO: 4.4 % — SIGNIFICANT CHANGE UP (ref 1.7–9.3)
NEUTROPHILS # BLD AUTO: 6.88 K/UL — HIGH (ref 1.4–6.5)
NEUTROPHILS NFR BLD AUTO: 82.2 % — HIGH (ref 42.2–75.2)
NRBC BLD AUTO-RTO: 0 /100 WBCS — SIGNIFICANT CHANGE UP (ref 0–0)
PLATELET # BLD AUTO: 282 K/UL — SIGNIFICANT CHANGE UP (ref 130–400)
PMV BLD: 9.5 FL — SIGNIFICANT CHANGE UP (ref 7.4–10.4)
POTASSIUM SERPL-MCNC: 3.5 MMOL/L — SIGNIFICANT CHANGE UP (ref 3.5–5)
POTASSIUM SERPL-SCNC: 3.5 MMOL/L — SIGNIFICANT CHANGE UP (ref 3.5–5)
PROT SERPL-MCNC: 6.8 G/DL — SIGNIFICANT CHANGE UP (ref 6–8)
RBC # BLD: 5.28 M/UL — SIGNIFICANT CHANGE UP (ref 4.2–5.4)
RBC # FLD: 16.8 % — HIGH (ref 11.5–14.5)
SALICYLATES SERPL-MCNC: <0.3 MG/DL — LOW (ref 4–30)
SARS-COV-2 RNA SPEC QL NAA+PROBE: SIGNIFICANT CHANGE UP
SODIUM SERPL-SCNC: 142 MMOL/L — SIGNIFICANT CHANGE UP (ref 135–146)
WBC # BLD: 8.37 K/UL — SIGNIFICANT CHANGE UP (ref 4.8–10.8)
WBC # FLD AUTO: 8.37 K/UL — SIGNIFICANT CHANGE UP (ref 4.8–10.8)

## 2025-04-12 PROCEDURE — 80061 LIPID PANEL: CPT

## 2025-04-12 PROCEDURE — 36415 COLL VENOUS BLD VENIPUNCTURE: CPT

## 2025-04-12 PROCEDURE — 84443 ASSAY THYROID STIM HORMONE: CPT

## 2025-04-12 PROCEDURE — 80307 DRUG TEST PRSMV CHEM ANLYZR: CPT

## 2025-04-12 PROCEDURE — 99285 EMERGENCY DEPT VISIT HI MDM: CPT

## 2025-04-12 PROCEDURE — 83036 HEMOGLOBIN GLYCOSYLATED A1C: CPT

## 2025-04-12 PROCEDURE — 97162 PT EVAL MOD COMPLEX 30 MIN: CPT | Mod: GP

## 2025-04-12 PROCEDURE — 70450 CT HEAD/BRAIN W/O DYE: CPT | Mod: 26

## 2025-04-12 PROCEDURE — 80053 COMPREHEN METABOLIC PANEL: CPT

## 2025-04-12 PROCEDURE — 93010 ELECTROCARDIOGRAM REPORT: CPT | Mod: 76

## 2025-04-12 RX ORDER — DIPHENHYDRAMINE HCL 12.5MG/5ML
50 ELIXIR ORAL EVERY 6 HOURS
Refills: 0 | Status: DISCONTINUED | OUTPATIENT
Start: 2025-04-12 | End: 2025-04-12

## 2025-04-12 RX ORDER — ACETAMINOPHEN 500 MG/5ML
650 LIQUID (ML) ORAL EVERY 6 HOURS
Refills: 0 | Status: DISCONTINUED | OUTPATIENT
Start: 2025-04-12 | End: 2025-04-24

## 2025-04-12 RX ORDER — HALOPERIDOL 10 MG/1
2 TABLET ORAL EVERY 6 HOURS
Refills: 0 | Status: DISCONTINUED | OUTPATIENT
Start: 2025-04-12 | End: 2025-04-24

## 2025-04-12 RX ORDER — DIAZEPAM 2 MG/1
10 TABLET ORAL
Refills: 0 | Status: DISCONTINUED | OUTPATIENT
Start: 2025-04-12 | End: 2025-04-16

## 2025-04-12 RX ORDER — HALOPERIDOL 10 MG/1
5 TABLET ORAL EVERY 6 HOURS
Refills: 0 | Status: DISCONTINUED | OUTPATIENT
Start: 2025-04-12 | End: 2025-04-12

## 2025-04-12 RX ORDER — LORAZEPAM 4 MG/ML
1 VIAL (ML) INJECTION EVERY 6 HOURS
Refills: 0 | Status: DISCONTINUED | OUTPATIENT
Start: 2025-04-12 | End: 2025-04-14

## 2025-04-12 RX ORDER — OXYCODONE HYDROCHLORIDE 30 MG/1
10 TABLET ORAL EVERY 8 HOURS
Refills: 0 | Status: DISCONTINUED | OUTPATIENT
Start: 2025-04-12 | End: 2025-04-16

## 2025-04-12 RX ORDER — DIAZEPAM 2 MG/1
10 TABLET ORAL
Refills: 0 | Status: DISCONTINUED | OUTPATIENT
Start: 2025-04-12 | End: 2025-04-12

## 2025-04-12 RX ORDER — LORAZEPAM 4 MG/ML
2 VIAL (ML) INJECTION EVERY 6 HOURS
Refills: 0 | Status: DISCONTINUED | OUTPATIENT
Start: 2025-04-12 | End: 2025-04-12

## 2025-04-12 RX ORDER — DIPHENHYDRAMINE HCL 12.5MG/5ML
25 ELIXIR ORAL EVERY 6 HOURS
Refills: 0 | Status: DISCONTINUED | OUTPATIENT
Start: 2025-04-12 | End: 2025-04-14

## 2025-04-12 RX ADMIN — OXYCODONE HYDROCHLORIDE 10 MILLIGRAM(S): 30 TABLET ORAL at 20:45

## 2025-04-12 RX ADMIN — OXYCODONE HYDROCHLORIDE 10 MILLIGRAM(S): 30 TABLET ORAL at 20:20

## 2025-04-12 RX ADMIN — DIAZEPAM 10 MILLIGRAM(S): 2 TABLET ORAL at 20:20

## 2025-04-12 NOTE — BH PATIENT PROFILE - ALLERGIES
Allergies:-  No Known Drug Allergies  shellfish       Allergies:-  Nuts  peanuts  No Known Drug Allergies  shellfish

## 2025-04-12 NOTE — ED BEHAVIORAL HEALTH ASSESSMENT NOTE - CURRENT MEDICATION
Valium Per son and istop: Diazepam 10mg BID, Zolpidem 10mg qHS, PRN Oxycodone 10mg TID, Pregabalin 100mg BID

## 2025-04-12 NOTE — ED BEHAVIORAL HEALTH ASSESSMENT NOTE - DETAILS
see HPI see HPI, patient likely minimizing given to unit son does not change medical decision making

## 2025-04-12 NOTE — BH PATIENT PROFILE - FALL HARM RISK - HARM RISK INTERVENTIONS
Assistance with ambulation/Assistance OOB with selected safe patient handling equipment/Communicate Risk of Fall with Harm to all staff/Discuss with provider need for PT consult/Monitor gait and stability/Provide patient with walking aids - walker, cane, crutches/Reinforce activity limits and safety measures with patient and family/Tailored Fall Risk Interventions/Use of alarms - bed, chair and/or voice tab/Visual Cue: Yellow wristband and red socks/Bed in lowest position, wheels locked, appropriate side rails in place/Call bell, personal items and telephone in reach/Instruct patient to call for assistance before getting out of bed or chair/Non-slip footwear when patient is out of bed/[place] to call system/Physically safe environment - no spills, clutter or unnecessary equipment/Purposeful Proactive Rounding/Room/bathroom lighting operational, light cord in reach

## 2025-04-12 NOTE — ED PROVIDER NOTE - PROGRESS NOTE DETAILS
Current meds. See medication reconciliation form. Reviewed with pt. Pt denies taking ASA,other blood thinners or anti-inflammatories. Pre-procedure assessment completed and information  communicated to procedure room RN during handoff. Pt has a ride post-procedure ( is ). Printed and verbal discharge instructions as well as education regarding infection prevention given to pt/pt's family who verbalized understanding.   CR: Patient's son Kalpesh [phone] endorses that patient will purposefully deny SI despite frequently expressing her wishes to hurt her herself with multiple intentional overdoses. Patient's son states that this time of year is especially difficult for their family as his brother (patient's son) passed away during this time last year. Patient's son Kalpesh is concerned for her wellbeing as this has become a repetitive pattern. Signed out to Dr. Dodge. Pt accepted from Dr. Garcia. Here after possible overdose, Pt is awaiting psych evaluation. Sleeping at this time with 1:1 sit. Pt is awake. Denies using drugs. Hx of CVA with speech deficit. Pt denies suicidal ideation. Lives with Kalpesh her son. + wt loss. Poor appetite. On exam thin female. S1S2 rrr, no murmur, lungs clear, no edema. Case discussed with psych. Will see pt. Ct scan head ordered. Pt is sitting and eating. Pt requested coffee. spoke to Dr. Evans. Requested toxicology consult.

## 2025-04-12 NOTE — ED BEHAVIORAL HEALTH ASSESSMENT NOTE - SUMMARY
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 Of note, son and father discovered that pill bottles which were recently filled were almost 2/3s empty and strongly believes this was a suicide attempt. Of note. Patient reporting that she only took 1 pill of oxycodone and her son told her to come to ED as he was worried. Patient also denying any persistent depressive, anxiety, manic, or psychotic symptoms. Patient denying having an overdose or SA or misusing medications, states she only takes Benadryl and PRN Oxycodone. Denies any current PSI/SI/IP. Per collateral from son, patient has a hx of severely minimizing symptoms and situations. He reports worsening depressed mood due to stomach uclers and death anniversary of older son, has expressed SI and wishing she was dead, found breathing but unresponsive by son and with pill bottles almost 2/3s empty even though recently filled. Reports a similar episode in January, which son believes was also an SA via overdose, but patient minimized symptoms and was discharged. He strongly believes that this was a SA, does not feel safe with patient being discharged, and is strongly advocating for admission. Given hx and presentation of very likely SA via OD, patient requires psychiatric hospitalization for safety and stabilization.     Recs  -9.39 involuntary admission (patient refusing voluntary)  -PRN Tylenol 650mg q6h for mild/moderate pain  -PRN Oxycodone 10mg TID for severe pain (home medication, confirmed on [facility]top and with son).  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 Of note, son and father discovered that pill bottles which were recently filled were almost 2/3s empty and strongly believes this was a suicide attempt. Of note. Patient reporting that she only took 1 pill of oxycodone and her son told her to come to ED as he was worried. Patient also denying any persistent depressive, anxiety, manic, or psychotic symptoms. Patient denying having an overdose or SA or misusing medications, states she only takes Benadryl and PRN Oxycodone. Denies any current PSI/SI/IP. Per collateral from son, patient has a hx of severely minimizing symptoms and situations. He reports worsening depressed mood due to stomach uclers and death anniversary of older son, has expressed SI and wishing she was dead, found breathing but unresponsive by son and with pill bottles almost 2/3s empty even though recently filled. Reports a similar episode in January, which son believes was also an SA via overdose, but patient minimized symptoms and was discharged. He strongly believes that this was a SA, does not feel safe with patient being discharged, and is strongly advocating for admission. Given hx and presentation of very likely SA via OD, patient requires psychiatric hospitalization for safety and stabilization.     Recs  -9.39 involuntary admission (patient refusing voluntary)  -PRN Tylenol 650mg q6h for mild/moderate pain  -PRN Oxycodone 10mg TID for severe pain (home medication, confirmed on [facility] and with son).  -Consider pain consult if pain not managed  -Continue home medication: PRN Diazepam 10mg BID (confirmed on [facility] and with son)  -PRNs for severe agitation, not redirectable: Haldol 2mg, Ativan 1mg, Bendaryl 25mg q6h

## 2025-04-12 NOTE — ED BEHAVIORAL HEALTH ASSESSMENT NOTE - RISK ASSESSMENT
rf: benzo/opioid misuse, worsening mood symptoms, death anniversary of son, likely recent SA  pf: supportive family, reasons for living  current: denies PSI/SI/IP

## 2025-04-12 NOTE — ED PROVIDER NOTE - PHYSICAL EXAMINATION
VITAL SIGNS: I have reviewed nursing notes and confirm.  CONSTITUTIONAL: In no acute distress.  SKIN: Skin exam is warm and dry.  HEAD: Normocephalic; atraumatic.  EYES: PERRL, EOM intact; conjunctiva and sclera clear.  ENT: No nasal discharge; airway clear.   NECK: Supple; non tender.  CARD: S1, S2; Regular rate and rhythm.  RESP: CTAB. Speaking in full sentences.   ABD: Non-tender.   EXT: Normal ROM.   NEURO: Alert, oriented x2. Grossly unremarkable. No focal deficits.

## 2025-04-12 NOTE — ED BEHAVIORAL HEALTH ASSESSMENT NOTE - DESCRIPTION
in overall good behavioral control    Vital Signs Last 24 Hrs  T(C): 36.6 (12 Apr 2025 03:22), Max: 36.6 (12 Apr 2025 03:22)  T(F): 97.9 (12 Apr 2025 03:22), Max: 97.9 (12 Apr 2025 03:22)  HR: 51 (12 Apr 2025 05:30) (51 - 52)  BP: 159/89 (12 Apr 2025 05:30) (128/94 - 159/89)  BP(mean): --  RR: 17 (12 Apr 2025 05:30) (17 - 17)  SpO2: 100% (12 Apr 2025 05:30) (100% - 100%)    Parameters below as of 12 Apr 2025 03:22  Patient On (Oxygen Delivery Method): room air see hpi see HPI

## 2025-04-12 NOTE — ED ADULT TRIAGE NOTE - CHIEF COMPLAINT QUOTE
Patient BIBA for taking to many of her prescribed medications. As per EMS son administered intranasal Narcan. Patient denies SI/HI. Patient denies taking anything. Patient A&Ox4, respirations easy and unlabored, NAD noted.

## 2025-04-12 NOTE — ED ADULT NURSE NOTE - NSFALLRISKINTERV_ED_ALL_ED
Assistance OOB with selected safe patient handling equipment if applicable/Assistance with ambulation/Communicate fall risk and risk factors to all staff, patient, and family/Monitor gait and stability/Provide visual cue: yellow wristband, yellow gown, etc/Reinforce activity limits and safety measures with patient and family/Call bell, personal items and telephone in reach/Instruct patient to call for assistance before getting out of bed/chair/stretcher/Non-slip footwear applied when patient is off stretcher/[place] to call system/Physically safe environment - no spills, clutter or unnecessary equipment/Purposeful Proactive Rounding/Room/bathroom lighting operational, light cord in reach

## 2025-04-12 NOTE — ED BEHAVIORAL HEALTH NOTE - BEHAVIORAL HEALTH NOTE
========================     FOR EACH COLLATERAL     ========================     Collateral below has requested that the information provided remain confidential: Yes [ x] No [  ]     Collateral below has provided information that patient is/may be unaware of: Yes [ x ] No [  ]     Patient gives permission to obtain collateral from _____:     ( ) Yes     ( x )  No     Rationale for overriding objection               (  ) Lack of capacity. Details: ________               ( x ) Assessing risk of danger to self/others. Details: ________     Rationale for selecting specific collateral source               ( x ) Potential to impact risk of danger to self/others and no alternative equivalent. Details: _____     NAME: Kalpesh     NUMBER:      RELATIONSHIP: Son.      RELIABILITY: Very Reliable.      COMMENTS: BTCM contacted collateral to obtain background information regarding safety after collateral was previously established.     ========================     PATIENT DEMOGRAPHICS:     ========================     HPI   BASELINE FUNCTIONING: The patient is a 60-year-old female domiciled with her  and son. Reportedly the patient has a history of multiple physical ailments that cause her physical pain at baseline. Reportedly, the patient has recently endorsed suicidal ideation to collateral stating that she wishes to be with her other son that is . Collateral believes this is an escalation in the patient decompensation. According to collateral, the patient has a hx of taking pain prescriptions medications and sleeping for the entire day.    DATE HPI STARTED: Last night   DECOMPENSATION: Reportedly the collateral found the patient on the floor, breathing but not alert and proceeded to call emergency service intervention. Patient was reportedly given an unknown amount of Narcan to recover.    SUICIDALITY: Reported SI   VIOLENCE: None reported   SUBSTANCE: Hx of prescription abuse.    ========================   PAST PSYCHIATRIC HISTORY   ========================   DATE PAST PSYCHIATRIC HISTORY STARTED: Ongoing   MAIN PSYCHIATRIC DIAGNOSIS: Unknown   PRIOR ILLNESS: None reported.    SUICIDALITY: Hx of SI.    VIOLENCE: None reported   SUBSTANCE USE: Reported history of abusing prescription pills.    ==============   OTHER HISTORY   ==============   CURRENT MEDICATION: See provider note.    MEDICAL HISTORY: See provider note.   ALLERGIES: None reported.    LEGAL ISSUES: None reported   FIREARM ACCESS: None reported   SOCIAL HISTORY: None reported   FAMILY HISTORY: None reported.     DEVELOPMENTAL HISTORY: None reported ========================     FOR EACH COLLATERAL     ========================     Collateral below has requested that the information provided remain confidential: Yes [ x] No [  ]     Collateral below has provided information that patient is/may be unaware of: Yes [ x ] No [  ]     Patient gives permission to obtain collateral from _____:     ( ) Yes     ( x )  No     Rationale for overriding objection               (  ) Lack of capacity. Details: ________               ( x ) Assessing risk of danger to self/others. Details: ________     Rationale for selecting specific collateral source               ( x ) Potential to impact risk of danger to self/others and no alternative equivalent. Details: _____     NAME: Kalpesh     NUMBER:      RELATIONSHIP: Son.      RELIABILITY: Very Reliable.      COMMENTS: BTCM contacted collateral to obtain background information regarding safety after collateral was previously established.     ========================     PATIENT DEMOGRAPHICS:     ========================     HPI   BASELINE FUNCTIONING: The patient is a 60-year-old female domiciled with her  and son. Reportedly the patient has a history of multiple physical ailments that cause her physical pain at baseline. Reportedly, the patient has recently endorsed suicidal ideation to collateral stating that she wishes to be with her other son that is . Collateral believes this is an escalation in the patient decompensation. According to collateral, the patient has a hx of taking pain prescriptions medications and sleeping for the entire day, furthermore, when the patient is unable to get medication from a medical professional, collateral reports that she buys them from neighbors.   DATE HPI STARTED: Last night   DECOMPENSATION: Reportedly the collateral found the patient on the floor, breathing but not alert and proceeded to call emergency service intervention. Patient was reportedly given an unknown amount of Narcan to recover. Upon further inspection, collateral reportedly discovered the patient prescribed medication bottles with two thirds of the medication missing. Reportedly the prescription was filled earlier this week.   SUICIDALITY: Reported SI   VIOLENCE: None reported   SUBSTANCE: Hx of prescription abuse.    ========================   PAST PSYCHIATRIC HISTORY   ========================   DATE PAST PSYCHIATRIC HISTORY STARTED: Ongoing   MAIN PSYCHIATRIC DIAGNOSIS: Unknown   PRIOR ILLNESS: None reported.    SUICIDALITY: Hx of SI.    VIOLENCE: None reported   SUBSTANCE USE: Reported history of abusing prescription pills.    ==============   OTHER HISTORY   ==============   CURRENT MEDICATION: See provider note.    MEDICAL HISTORY: See provider note.   ALLERGIES: None reported.    LEGAL ISSUES: None reported   FIREARM ACCESS: None reported   SOCIAL HISTORY: None reported   FAMILY HISTORY: None reported.     DEVELOPMENTAL HISTORY: None reported

## 2025-04-12 NOTE — ED PROVIDER NOTE - ATTENDING APP SHARED VISIT CONTRIBUTION OF CARE
60-year-old female, history of polysubstance abuse, chronic back pain, restless leg syndrome, CVA, brought in by EMS for overdose.  Was given intranasal Narcan with good response.  Son states he is depressed and expressed suicidal thoughts.  Patient  denies drug use, SI/HI.  Exam shows drowsy patient in no distress, HEENT NCAT, neck supple, lungs clear, RR S1S2, abdomen soft NT +BS, no CCE, neuro A&OX3 GCS 15 no deficits.

## 2025-04-12 NOTE — ED BEHAVIORAL HEALTH ASSESSMENT NOTE - HPI (INCLUDE ILLNESS QUALITY, SEVERITY, DURATION, TIMING, CONTEXT, MODIFYING FACTORS, ASSOCIATED SIGNS AND SYMPTOMS)
I took one pill of oxycodone and son said to me you took a pill and you're going in  because hes a noah child  And I came in here and I don't know why  came in   I didn't come in an ambulance  good;   he went into the hospital, I took him in, I helped"  no I didn't  mood good, very good (8hrs), appetite (not so good, 2-3 weeks, lost 10lb in about a month)  had a stomach removal - unclear what was wrong,     diagnoses: Depression  substance: toxicology  my cousin at 15 killed himself, my son tried to kill himself and that destroyed me but he stopped and was good and thast it,     Benadryl 25mg  Inhaler  Medical problems - stomach removal, named after me, mother and father took me thospital and they ddin't know how , they finally found and named my name    coping skills  when i'm sad, if my  is sick and   I feel better because I thank a lot, watch tv, bike ride  happy, have a beautiful home, the only thing that made me very upset was that my older son  (13 years ago)    6year psychiatrist - no medication  therapist yes - 3x a month 60 year old female, domiciled with  and adult son, unemployed, with PMHx of 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 Of note, son and father discovered that pill bottles which were recently filled were almost 2/3s empty and strongly believes this was a suicide attempt. 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 Of note, son and father discovered that pill bottles which were recently filled were almost 2/3s empty and strongly believes this was a suicide attempt.    Of note, patient somewhat forgetful during interview, not recalling names or past dates very well (for example reported son had passed away 13 years ago, but he was actually in a car accident 13 years ago and passed away 6 years ago, reports death anniversary of son is in May, but death anniversary is actually in April). Patient reports that she only took one pill of oxycodone and then her son told her she is going to the hospital (of note, this is not accurate, patient found breathing but unresponsive, EMS activated, patient given Narcan on field and brought to hospital). She denies taking any more pills than this, denies overdosing, denies ever taking her medications other than how its prescribed. Patient states that the only medication she is on is Benadryl for her allergies. When asked about the oxycodone she reports taking, she states that it was past prescriptions and she took it for pain. She expresses that her son made her come to hospital because "hes very nice and was worried" but that she would like to go home. She reports her mood is good, but states she does get sad when she thinks of her older son who  13 years ago (come back to son's death multiple times during interview). She reports good sleep and energy. She reports appetite is somewhat chronically low due to partial resection of stomach but that she tries to get 2-3 meals a day. She denies anhedonia, reports enjoying watching television. She denies any persistent depressive, anxiety, manic, or psychotic symptoms. She denies have a psychiatrist, states she has a therapist she sees approximately 3 times a month. She denies any recent or current PSI/SI/IP, denies HI/IP, denies AVH. She states that she has a great life and great home and would never harm self.     Collateral obtained from adult son Kalpesh #[phone] by both JUNIOR Rooney (see separate note) and this writer. Per son, patient is not overtly forgetful or confused at baseline, and things her current state is likely due to situation regarding overdose/Narcan. He also reports that patient has a hx of severely minimizing symptoms and situations. He reports that patient has been had worsening depressed mood for the past week, especially since "hearing some bad news regarding her uclers from her doctor, but she didn't tell me the details" and due to brother's death anniversary being this month. He reports that patient has expressed SI, and stated she wished to be with her dead son. He states that he found patient down/unresponsive and that he and his father found her pill bottles almost 2/3 empty and that they were just filled recently. He reports the pill bottles were that of Valium, Oxycodone, and Butalbital (unsure of doses). Reports a similar episode in January, which son believes was also an SA via overdose, but she "minimized symptoms and was able to talk her way to being discharged." He reports that patient also at times misuses prescribed medications and will take more than directed. He reports that patient has a psychiatrist who she sees once a month for the Valium but denies that she has a therapist she sees 3x a month. Reports that patient has chronically been somewhat unsteady on her feet and has hx of falls, though she does not use walker/cane. He strongly believes that this was a SA, does not feel safe with patient being discharged, and is strongly advocating for admission.    Per [facility]: Patient prescribed Diazepam 10mg, 60 tablets for 30 day supply, last dispensed on 3/11/25 and Zolpidem 10mg, 30 tablets for 30 day supply, last dispensed on 3/11/25 by Rosanna Walden. Patient prescribed oxycodone 10mg, 84 tabs for 28 day supply, last dispensed on 3/24/25 and pregabalin 100mg, 56 tablets for a 28 day supply, last dispensed on 3/17/25 by Gerardo Wang.

## 2025-04-12 NOTE — ED PROVIDER NOTE - OBJECTIVE STATEMENT
Patient is a 60-year-old female PMH polysubstance abuse, chronic back pain, fibromyalgia, restless leg syndrome, CVA in 2016 and 2018, [facility].  As per EMS, 2 doses of intranasal Narcan were given with improvement of mental status.  Patient's son Kalpesh is concerned for patient's wellbeing as this is not the first time this occurred.  Patient's son states that patient will admit that she is depressed and wants to hurt herself and will then purposefully overdose on a mixture of Valium, oxycodone, and butorphanol.  Patient's son is unsure of the amount that she took, and patient denies that she took any substances and is unsure of what happened.  Patient has no complaints at this time but is not providing any history.

## 2025-04-12 NOTE — ED PROVIDER NOTE - CLINICAL SUMMARY MEDICAL DECISION MAKING FREE TEXT BOX
Pt presented with possible drug overdose. CT scan head neg. Toxicology cleared pt. Pt admitted by psych.

## 2025-04-12 NOTE — BH PATIENT PROFILE - HOME MEDICATIONS
Narcan 4 mg/0.1 mL nasal spray , 1 spray(s) intranasally once PRN for opiate overdose  Ambien 10 mg oral tablet , 1 tab(s) orally once a day (at bedtime) MDD: 1 tablet  diazePAM 10 mg oral tablet , 1 tab(s) orally 2 times a day MDD: 2 tablets  Lyrica 150 mg oral capsule , 1 cap(s) orally 2 times a day MDD: 2 tablets  oxyCODONE 10 mg oral tablet , 1 tab(s) orally 3 times a day as needed for  severe pain MDD: 3 tablets  furosemide 20 mg oral tablet , 1 tab(s) orally once a day  atorvastatin 40 mg oral tablet , 1 tab(s) orally once a day (at bedtime)  aspirin 81 mg oral delayed release tablet , 1 tab(s) orally 2 times a day MDD:2  acetaminophen 325 mg oral tablet , 2 tab(s) orally every 6 hours MDD:8  Zantac 150 oral tablet , 1 tab(s) orally 2 times a day

## 2025-04-13 LAB
APPEARANCE UR: CLEAR — SIGNIFICANT CHANGE UP
BILIRUB UR-MCNC: NEGATIVE — SIGNIFICANT CHANGE UP
COLOR SPEC: YELLOW — SIGNIFICANT CHANGE UP
DIFF PNL FLD: NEGATIVE — SIGNIFICANT CHANGE UP
GLUCOSE UR QL: NEGATIVE MG/DL — SIGNIFICANT CHANGE UP
KETONES UR-MCNC: NEGATIVE MG/DL — SIGNIFICANT CHANGE UP
LEUKOCYTE ESTERASE UR-ACNC: ABNORMAL
NITRITE UR-MCNC: NEGATIVE — SIGNIFICANT CHANGE UP
PH UR: 6 — SIGNIFICANT CHANGE UP (ref 5–8)
PROT UR-MCNC: NEGATIVE MG/DL — SIGNIFICANT CHANGE UP
SP GR SPEC: 1.01 — SIGNIFICANT CHANGE UP (ref 1–1.03)
UROBILINOGEN FLD QL: 0.2 MG/DL — SIGNIFICANT CHANGE UP (ref 0.2–1)

## 2025-04-13 PROCEDURE — 99232 SBSQ HOSP IP/OBS MODERATE 35: CPT

## 2025-04-13 PROCEDURE — 99451 NTRPROF PH1/NTRNET/EHR 5/>: CPT

## 2025-04-13 RX ORDER — OXYCODONE HYDROCHLORIDE 30 MG/1
10 TABLET ORAL ONCE
Refills: 0 | Status: DISCONTINUED | OUTPATIENT
Start: 2025-04-13 | End: 2025-04-13

## 2025-04-13 RX ORDER — ALBUTEROL SULFATE 2.5 MG/3ML
1 VIAL, NEBULIZER (ML) INHALATION EVERY 4 HOURS
Refills: 0 | Status: DISCONTINUED | OUTPATIENT
Start: 2025-04-13 | End: 2025-04-24

## 2025-04-13 RX ADMIN — OXYCODONE HYDROCHLORIDE 10 MILLIGRAM(S): 30 TABLET ORAL at 16:00

## 2025-04-13 RX ADMIN — OXYCODONE HYDROCHLORIDE 10 MILLIGRAM(S): 30 TABLET ORAL at 08:12

## 2025-04-13 RX ADMIN — Medication 650 MILLIGRAM(S): at 13:03

## 2025-04-13 RX ADMIN — DIAZEPAM 10 MILLIGRAM(S): 2 TABLET ORAL at 08:12

## 2025-04-13 RX ADMIN — OXYCODONE HYDROCHLORIDE 10 MILLIGRAM(S): 30 TABLET ORAL at 23:27

## 2025-04-13 RX ADMIN — OXYCODONE HYDROCHLORIDE 10 MILLIGRAM(S): 30 TABLET ORAL at 09:00

## 2025-04-13 RX ADMIN — Medication 650 MILLIGRAM(S): at 14:03

## 2025-04-13 RX ADMIN — OXYCODONE HYDROCHLORIDE 10 MILLIGRAM(S): 30 TABLET ORAL at 15:27

## 2025-04-13 RX ADMIN — OXYCODONE HYDROCHLORIDE 10 MILLIGRAM(S): 30 TABLET ORAL at 21:42

## 2025-04-13 RX ADMIN — DIAZEPAM 10 MILLIGRAM(S): 2 TABLET ORAL at 20:02

## 2025-04-13 NOTE — BH INPATIENT PSYCHIATRY ASSESSMENT NOTE - NSBHCHARTREVIEWVS_PSY_A_CORE FT
Vital Signs Last 24 Hrs  T(C): 36.8 (04-13-25 @ 08:45), Max: 36.8 (04-13-25 @ 08:45)  T(F): 98.2 (04-13-25 @ 08:45), Max: 98.2 (04-13-25 @ 08:45)  HR: 92 (04-13-25 @ 08:45) (74 - 92)  BP: 137/86 (04-13-25 @ 08:45) (132/78 - 158/99)  BP(mean): --  RR: 18 (04-12-25 @ 15:30) (18 - 18)  SpO2: 96% (04-13-25 @ 08:45) (96% - 100%)

## 2025-04-13 NOTE — BH INPATIENT PSYCHIATRY ASSESSMENT NOTE - CURRENT MEDICATION
MEDICATIONS  (STANDING):    MEDICATIONS  (PRN):  acetaminophen     Tablet .. 650 milliGRAM(s) Oral every 6 hours PRN Mild Pain (1 - 3), Moderate Pain (4 - 6)  diazepam    Tablet 10 milliGRAM(s) Oral two times a day PRN anxiety  diphenhydrAMINE 25 milliGRAM(s) Oral every 6 hours PRN Combative behavior  haloperidol     Tablet 2 milliGRAM(s) Oral every 6 hours PRN agitation  LORazepam     Tablet 1 milliGRAM(s) Oral every 6 hours PRN Agitation  oxyCODONE    IR 10 milliGRAM(s) Oral every 8 hours PRN Severe Pain (7 - 10)

## 2025-04-13 NOTE — BH INPATIENT PSYCHIATRY ASSESSMENT NOTE - RISK ASSESSMENT
s/p overdose  with pills.   chronic pain  and anxiety.   lives with her son and  .    Presybeterian

## 2025-04-13 NOTE — BH INPATIENT PSYCHIATRY ASSESSMENT NOTE - INTERRUPTED ATTEMPT:
-- DO NOT REPLY / DO NOT REPLY ALL --  -- Message is from Engagement Center Operations ([employer]) --    General Patient Message:  Mom needs to set up 3 appointments to establish care with Dr Fern Hernandez and to make sure all 3 kids are up to date on their shots. Previously saw Dr Lorna Pedraza at [facility].  Additional messages sent under each child's chart.     Caller Information         Type Contact Phone/Fax    01/09/2024 04:50 PM CST Phone (Incoming) SANCHEZ PRADHAN (Mother) [phone]          Alternative phone number: none    Can a detailed message be left? Yes    Message Turnaround:     Is it Working Hours? Yes - Working Hours                      None known

## 2025-04-13 NOTE — CONSULT NOTE ADULT - ASSESSMENT
59 y/o F presenting after assumed opioid overdose. Pt has now been observed for multiple hours in the ED with return to mental status.    --If LFT are WNL pt is able to ambulate, tolerate PO and clinically sober then can clear from tox perspective     Thank you for involving us in the care of this patient. Assessment and plan discussed with toxicology attending Dr. Xu Steinberg. Please do not hesitate to reach out to the toxicology team for any further questions or concerns.    The On-Call Toxicology Fellow can be reached 24/7 via Pager #[phone]  Please send a 10 digit call back # as [place] cover multiple hospitals    Reyna Trejo MD  Toxicology Fellow  PGY-4  
59 y/o M Hx of fibromyalgia, restless leg syndrome, CVA presented after over presented obtunded at home around 3am with Butorphanol, diazepam, oxycodone. Pt responded to 2 doses of naloxone and was observed over night in the ED. Now has returned to baseline mental status. Rate 56 QRS 82  repeat EKG rate 59 QRS 78    AST MEDICAL & SURGICAL HISTORY:  Fibromyalgia      Lyme disease      Back pain, chronic      Polysubstance dependence      Restless leg syndrome      Right hip pain      H/O fracture of rib  10+ yeras ago      Former smoker      Cerebrovascular accident (CVA)  in 2016 and 2018 NO residual      History of appendectomy      History of  section      Fracture, radius      History of gastric surgery          MEDICATION HISTORY:      FAMILY HISTORY:  No pertinent family history in first degree relatives        PHYSICAL EXAM      general: AAOX3, nad  pulm: CTA  cardiac: +s1, s2  abdomen: soft, nt  ext : no edema             SIGNIFICANT LABORATORY STUDIES:                        13.3   8.37  )-----------( 282      ( 2025 03:54 )             43.8       04    142  |  106  |  22[H]  ----------------------------<  95  3.5   |  21  |  1.1    Ca    9.4      2025 03:54            Urinalysis Basic - ( 2025 03:54 )    Color: x / Appearance: x / SG: x / pH: x  Gluc: 95 mg/dL / Ketone: x  / Bili: x / Urobili: x   Blood: x / Protein: x / Nitrite: x   Leuk Esterase: x / RBC: x / WBC x   Sq Epi: x / Non Sq Epi: x / Bacteria: x        Anion Gap: 15[H]  @ 03:54  CK: --  @ 03:54  Troponin:  --   @ 03:54  Pro-BNP:  --   @ 03:54  VBG:  --   @ 03:54  Carboxyhemoglobin %:  --   @ 03:54  Methemoglobin %:  --   @ 03:54  Osmolality Serum:  --   @ 03:54  Aspirin Level: <0.3[L]   @ 03:54  Acetaminophen Level:  11.0   @ 03:54  Ethanol Level:  --   @ 03:54  Digoxin Level:  --   @ 03:54  Phenytoin Level:  --   @ 03:54  Carbamazepine level:  --   @ 03:54  Lamotrigine level:  --   @ 03:54      RADIOLOGIC STUDIES        Assessment and Recommendation:   · Assessment	  59 y/o F presenting after assumed opioid overdose. Pt has now been observed for multiple hours in the ED with return to mental status.    -- LFTs are WNL , tolerating  PO   cont to monitor in inpatient psych unit  -monitor for withdrawl   addiction medicine on board  -would cont to hold benzodiazapines , hold ambien ,monitor for withdrawl  depression medication as per psych     fibromyalgia:  she takes oxycodone for chronic back pain   would restart home medications and discuss with addiction medicine     hypothyroidism: cont synthroid   recommend to f/u a TSH    H/o CVA: cont asa, statin   monitor vitals

## 2025-04-13 NOTE — BH INPATIENT PSYCHIATRY ASSESSMENT NOTE - NSBHMETABOLIC_PSY_ALL_CORE_FT
BMI: BMI (kg/m2): 19 (04-12-25 @ 17:29)  HbA1c:   Glucose: POCT Blood Glucose.: 87 mg/dL (04-12-25 @ 03:35)    BP: 137/86 (04-13-25 @ 08:45) (128/94 - 159/89)Vital Signs Last 24 Hrs  T(C): 36.8 (04-13-25 @ 08:45), Max: 36.8 (04-13-25 @ 08:45)  T(F): 98.2 (04-13-25 @ 08:45), Max: 98.2 (04-13-25 @ 08:45)  HR: 92 (04-13-25 @ 08:45) (74 - 92)  BP: 137/86 (04-13-25 @ 08:45) (132/78 - 158/99)  BP(mean): --  RR: 18 (04-12-25 @ 15:30) (18 - 18)  SpO2: 96% (04-13-25 @ 08:45) (96% - 100%)      Lipid Panel:

## 2025-04-13 NOTE — BH INPATIENT PSYCHIATRY ASSESSMENT NOTE - CURRENT ACTIVE IDEATION:
Problem: Chronic Conditions and Co-morbidities  Goal: Patient's chronic conditions and co-morbidity symptoms are monitored and maintained or improved  Outcome: Adequate for Discharge     Problem: Discharge Planning  Goal: Discharge to home or other facility with appropriate resources  Outcome: Adequate for Discharge     Problem: Pain  Goal: Verbalizes/displays adequate comfort level or baseline comfort level  Outcome: Adequate for Discharge     Problem: Safety - Adult  Goal: Free from fall injury  Outcome: Adequate for Discharge     Problem: Occupational Therapy - Adult  Goal: By Discharge: Performs self-care activities at highest level of function for planned discharge setting.  See evaluation for individualized goals.  Description: FUNCTIONAL STATUS PRIOR TO ADMISSION:  Patient was ambulatory without use of an assistive device PTA and managed ADLs independently.         HOME SUPPORT: Patient lived alone; Daughter lives locally.    Occupational Therapy Goals:  Initiated 11/30/2024  1.  Patient will perform grooming with Modified Wetzel within 7 day(s).  2.  Patient will perform upper body dressing and bathing with Minimal Assist within 7 day(s).  3.  Patient will perform lower body dressing and bathing with Minimal Assist within 7 day(s).  4.  Patient will perform toilet transfers with Supervision  within 7 day(s).  5.  Patient will perform all aspects of toileting with Supervision within 7 day(s).  6.  Patient will participate in upper extremity therapeutic exercise/activities with Modified Wetzel for 10 minutes within 7 day(s).    7.  Patient will utilize energy conservation techniques during functional activities with verbal cues within 7 day(s).    11/30/2024 1450 by Shannan Cordova OT  Outcome: Progressing      None known

## 2025-04-13 NOTE — BH INPATIENT PSYCHIATRY ASSESSMENT NOTE - NSSUICRSKFACTOR_PSY_ALL_CORE
Assumed care of patient, bedside report received from Sonya day shift RN. Updated on plan of care, call light within reach and fall precautions are in place and bed lock and in lowest position. Patient instructed to call for assistance before getting out of bed. All questions answered at this time. No other needs.    Current and Past Psychiatric Diagnoses

## 2025-04-13 NOTE — BH INPATIENT PSYCHIATRY ASSESSMENT NOTE - HPI (INCLUDE ILLNESS QUALITY, SEVERITY, DURATION, TIMING, CONTEXT, MODIFYING FACTORS, ASSOCIATED SIGNS AND SYMPTOMS)
Chart reviewed , discussed with staff  and patient evaluated by her bed side.  she is pleasant and  cooperative.   she reports  that she did not sleep  for 3 days  and  finally  was in deep sleep and fell off  from her bed. She reports  that her son mistakenly called 911 and he thought " I did  overdose with pills"  She reports that she is Taoism and  would never think of commit ing suicide. she reports that she has had 2 hip replacement  surgeries and is on oxycodone  for many years  , prescribed by pain management  and clonazepam prescribed by her psychiatrist . she seems minimizing her overdose with pills.  she reports that keeps empty bottles in her  draw.  she denies feeling   depress. denies s/h ideations intent or plan. denies a/VH. denies manic symptoms.  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 Of note, son and father discovered that pill bottles which were recently filled were almost 2/3s empty and strongly believes this was a suicide attempt.    Of note, patient somewhat forgetful during interview, not recalling names or past dates very well (for example reported son had passed away 13 years ago, but he was actually in a car accident 13 years ago and passed away 6 years ago, reports death anniversary of son is in May, but death anniversary is actually in April). Patient reports that she only took one pill of oxycodone and then her son told her she is going to the hospital (of note, this is not accurate, patient found breathing but unresponsive, EMS activated, patient given Narcan on field and brought to hospital). She denies taking any more pills than this, denies overdosing, denies ever taking her medications other than how its prescribed. Patient states that the only medication she is on is Benadryl for her allergies. When asked about the oxycodone she reports taking, she states that it was past prescriptions and she took it for pain. She expresses that her son made her come to hospital because "hes very nice and was worried" but that she would like to go home. She reports her mood is good, but states she does get sad when she thinks of her older son who  13 years ago (come back to son's death multiple times during interview). She reports good sleep and energy. She reports appetite is somewhat chronically low due to partial resection of stomach but that she tries to get 2-3 meals a day. She denies anhedonia, reports enjoying watching television. She denies any persistent depressive, anxiety, manic, or psychotic symptoms. She denies have a psychiatrist, states she has a therapist she sees approximately 3 times a month. She denies any recent or current PSI/SI/IP, denies HI/IP, denies AVH. She states that she has a great life and great home and would never harm self.     Collateral obtained from adult son Kalpesh #[phone] by both JUNIOR Rooney (see separate note) and this writer. Per son, patient is not overtly forgetful or confused at baseline, and things her current state is likely due to situation regarding overdose/Narcan. He also reports that patient has a hx of severely minimizing symptoms and situations. He reports that patient has been had worsening depressed mood for the past week, especially since "hearing some bad news regarding her uclers from her doctor, but she didn't tell me the details" and due to brother's death anniversary being this month. He reports that patient has expressed SI, and stated she wished to be with her dead son. He states that he found patient down/unresponsive and that he and his father found her pill bottles almost 2/3 empty and that they were just filled recently. He reports the pill bottles were that of Valium, Oxycodone, and Butalbital (unsure of doses). Reports a similar episode in January, which son believes was also an SA via overdose, but she "minimized symptoms and was able to talk her way to being discharged." He reports that patient also at times misuses prescribed medications and will take more than directed. He reports that patient has a psychiatrist who she sees once a month for the Valium but denies that she has a therapist she sees 3x a month. Reports that patient has chronically been somewhat unsteady on her feet and has hx of falls, though she does not use walker/cane. He strongly believes that this was a SA, does not feel safe with patient being discharged, and is strongly advocating for admission.    Per [facility]: Patient prescribed Diazepam 10mg, 60 tablets for 30 day supply, last dispensed on 3/11/25 and Zolpidem 10mg, 30 tablets for 30 day supply, last dispensed on 3/11/25 by Rosanna Walden. Patient prescribed oxycodone 10mg, 84 tabs for 28 day supply, last dispensed on 3/24/25 and pregabalin 100mg, 56 tablets for a 28 day supply, last dispensed on 3/17/25 by Gerardo Wang.

## 2025-04-14 DIAGNOSIS — F11.10 OPIOID ABUSE, UNCOMPLICATED: ICD-10-CM

## 2025-04-14 PROCEDURE — 99231 SBSQ HOSP IP/OBS SF/LOW 25: CPT

## 2025-04-14 RX ORDER — TRAZODONE HCL 100 MG
50 TABLET ORAL AT BEDTIME
Refills: 0 | Status: DISCONTINUED | OUTPATIENT
Start: 2025-04-14 | End: 2025-04-24

## 2025-04-14 RX ORDER — LOSARTAN POTASSIUM 100 MG/1
100 TABLET, FILM COATED ORAL DAILY
Refills: 0 | Status: DISCONTINUED | OUTPATIENT
Start: 2025-04-14 | End: 2025-04-24

## 2025-04-14 RX ADMIN — DIAZEPAM 10 MILLIGRAM(S): 2 TABLET ORAL at 22:45

## 2025-04-14 RX ADMIN — OXYCODONE HYDROCHLORIDE 10 MILLIGRAM(S): 30 TABLET ORAL at 18:00

## 2025-04-14 RX ADMIN — OXYCODONE HYDROCHLORIDE 10 MILLIGRAM(S): 30 TABLET ORAL at 10:18

## 2025-04-14 RX ADMIN — OXYCODONE HYDROCHLORIDE 10 MILLIGRAM(S): 30 TABLET ORAL at 17:36

## 2025-04-14 RX ADMIN — DIAZEPAM 10 MILLIGRAM(S): 2 TABLET ORAL at 08:02

## 2025-04-14 RX ADMIN — OXYCODONE HYDROCHLORIDE 10 MILLIGRAM(S): 30 TABLET ORAL at 09:09

## 2025-04-14 RX ADMIN — Medication 50 MILLIGRAM(S): at 22:44

## 2025-04-14 RX ADMIN — Medication 20 MILLIGRAM(S): at 16:21

## 2025-04-14 NOTE — PSYCHIATRIC REHAB INITIAL EVALUATION - PATIENT'S PREFERRED PRONOUN
Patient is calling    Call connected to OBGYN Triage Queue.  Routed to provider's clinical support pool.     Her/She

## 2025-04-14 NOTE — PSYCHIATRIC REHAB INITIAL EVALUATION - NSBHPRTOOLBOX_PSY_ALL_CORE
Pt reportedly in outpt tx with Dr. Kirkland and sees a therapist x3 a month. Pt identifies as Catholic/Entertainment/Family support/Spirituality/Sabianism/Treatment team provider support

## 2025-04-14 NOTE — BH INPATIENT PSYCHIATRY PROGRESS NOTE - NSBHFUPINTERVALHXFT_PSY_A_CORE
Patient seen and evaluated on IPP with Attending psychiatrist Dr. Murguia 4/14/25. On approach patient calm and semi cooperative, pleasant. Patient adamantly states that she did not have a suicide attempt. states she has chronic insomnia, did not sleep for 2 days, took and extra Ambien. Made it a point to tell us that she is "Presybeterian", her cousin committed suicide and would call 911 on anyone with SI, including herself. Denies suicidal/homicidal ideations. Denies depression. States she has some anxiety but take valium for it. States this is the only time she took more medication than she should have. States she takes her medication as prescribes. Presents as evasive. Appears to be minimizing her symptoms and the fact that she overdosed. States she was just sleeping. Will continue to monitor. At this time team unsure if patient is safe to go home. Attending discussed possible adjustments to patients medication. Addiction and CATCH team consult put in. Denies any ETOH or drug use.

## 2025-04-14 NOTE — BH CHART NOTE - NSEVENTNOTEFT_PSY_ALL_CORE
Spoke to pharmacist from [facility] [phone] to confirm patients current medications.     Valium 10 mg BID - Dr. Walden [phone]  Ambien 10 mg PRN  Fioricet 50/325/40 Q4 hrs PRN  Losartan 100 mg PO QD  Lyrica 100 mg BID PRN   Oxycodone 10 mg 3x day PRN    Spoke to pharmacist from [facility] [phone] to confirm patients current medications.     Valium 10 mg BID - Dr. Walden [phone]  Ambien 10 mg PRN  Fioricet 50/325/40 Q4 hrs PRN  Losartan 100 mg PO QD  Lyrica 100 mg BID PRN   Oxycodone 10 mg 3x day PRN     Attempted to call Psychiatrist Dr. Walden, [phone]. Left message to call back.     Attempted to call , Ryan [phone] Spoke to pharmacist from [facility] [phone] to confirm patients current medications.     Valium 10 mg BID - Dr. Walden [phone]  Ambien 10 mg PRN  Fioricet 50/325/40 Q6 hrs PRN  Losartan 100 mg PO QD  Lyrica 100 mg BID PRN   Oxycodone 10 mg 3x day PRN     Attempted to call Psychiatrist Dr. Walden, [phone]. Left message to call back.     Attempted to call , Ryan [phone]

## 2025-04-14 NOTE — BH INPATIENT PSYCHIATRY PROGRESS NOTE - CURRENT MEDICATION
MEDICATIONS  (STANDING):    MEDICATIONS  (PRN):  acetaminophen     Tablet .. 650 milliGRAM(s) Oral every 6 hours PRN Mild Pain (1 - 3), Moderate Pain (4 - 6)  albuterol    90 MICROgram(s) HFA Inhaler 1 Puff(s) Inhalation every 4 hours PRN cough, sob  diazepam    Tablet 10 milliGRAM(s) Oral two times a day PRN anxiety  diphenhydrAMINE 25 milliGRAM(s) Oral every 6 hours PRN Combative behavior  haloperidol     Tablet 2 milliGRAM(s) Oral every 6 hours PRN agitation  LORazepam     Tablet 1 milliGRAM(s) Oral every 6 hours PRN Agitation  oxyCODONE    IR 10 milliGRAM(s) Oral every 8 hours PRN Severe Pain (7 - 10)   MEDICATIONS  (STANDING):  losartan 100 milliGRAM(s) Oral daily    MEDICATIONS  (PRN):  acetaminophen     Tablet .. 650 milliGRAM(s) Oral every 6 hours PRN Mild Pain (1 - 3), Moderate Pain (4 - 6)  albuterol    90 MICROgram(s) HFA Inhaler 1 Puff(s) Inhalation every 4 hours PRN cough, sob  diazepam    Tablet 10 milliGRAM(s) Oral two times a day PRN anxiety  haloperidol     Tablet 2 milliGRAM(s) Oral every 6 hours PRN agitation  oxyCODONE    IR 10 milliGRAM(s) Oral every 8 hours PRN Severe Pain (7 - 10)  traZODone 50 milliGRAM(s) Oral at bedtime PRN insomnia

## 2025-04-14 NOTE — BH SAFETY PLAN - ENVIRONMENT SAFETY 1:
Making medication adjustments and appointing someone to oversee and administer my medications is a way to be safer. Getting grief therapy may help me be safer. Participating in groups and social clubs would help to encourage me.

## 2025-04-14 NOTE — BH INPATIENT PSYCHIATRY PROGRESS NOTE - PRN MEDS
Please notify patient that their CT is normal. He has no blockages and his heart function is entirely normal. F/u in 6 months. MEDICATIONS  (PRN):  acetaminophen     Tablet .. 650 milliGRAM(s) Oral every 6 hours PRN Mild Pain (1 - 3), Moderate Pain (4 - 6)  albuterol    90 MICROgram(s) HFA Inhaler 1 Puff(s) Inhalation every 4 hours PRN cough, sob  diazepam    Tablet 10 milliGRAM(s) Oral two times a day PRN anxiety  diphenhydrAMINE 25 milliGRAM(s) Oral every 6 hours PRN Combative behavior  haloperidol     Tablet 2 milliGRAM(s) Oral every 6 hours PRN agitation  LORazepam     Tablet 1 milliGRAM(s) Oral every 6 hours PRN Agitation  oxyCODONE    IR 10 milliGRAM(s) Oral every 8 hours PRN Severe Pain (7 - 10)   MEDICATIONS  (PRN):  acetaminophen     Tablet .. 650 milliGRAM(s) Oral every 6 hours PRN Mild Pain (1 - 3), Moderate Pain (4 - 6)  albuterol    90 MICROgram(s) HFA Inhaler 1 Puff(s) Inhalation every 4 hours PRN cough, sob  diazepam    Tablet 10 milliGRAM(s) Oral two times a day PRN anxiety  haloperidol     Tablet 2 milliGRAM(s) Oral every 6 hours PRN agitation  oxyCODONE    IR 10 milliGRAM(s) Oral every 8 hours PRN Severe Pain (7 - 10)  traZODone 50 milliGRAM(s) Oral at bedtime PRN insomnia

## 2025-04-14 NOTE — UM REPORT PROGRESS NOTE - NSUMRMPROVIDER_GEN_A_CORE FT
[facility]  ID #[id number]  [phone]     4/14- CMA spoke with Landy at [facility]. Pending Auth# [id number]. CM will be assigned assigned within 48 hrs. [facility]  ID #[id number]  [phone]     4/14- JENN spoke with Landy at [facility]. Pending Auth# [id number]. CM will be assigned assigned within 48 hrs.  4/15 Josefina - received call from LINDY Mir at [facility] ext. [phone]. Patient approved 17 days. 4/12 - 4/28. LCD and review on 4/28 [place] [facility]  ID #[id number]  [phone]     4/14- [facility] spoke with Landy at [facility]. Pending Auth# [id number]. CM will be assigned assigned within 48 hrs.  4/15 Josefina - received call from LINDY Mir at [facility] ext. [phone]. Patient approved 17 days. 4/12 - 4/28. LCD and review on 4/28 4/24- Josefina - faxed discharge clinicals to [facility] [phone]

## 2025-04-14 NOTE — BH INPATIENT PSYCHIATRY PROGRESS NOTE - NSBHASSESSSUMMFT_PSY_ALL_CORE
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 Of note, son and father discovered that pill bottles which were recently filled were almost 2/3s empty and strongly believes this was a suicide attempt.    Patient seen and evaluated on IPP with Attending psychiatrist Dr. Murguia 4/14/25. Patient adamantly states that she did not have a suicide attempt. states she has chronic insomnia, did not sleep for 2 days, took and extra Ambien. Denies suicidal/homicidal ideations. Denies depression. States she has some anxiety but take valium for it. Presents as evasive. Appears to be minimizing her symptoms and the fact that she overdosed. States she was just sleeping. Will continue to monitor. At this time team unsure if patient is safe to go home. Attending discussed possible adjustments to patients medication. Addiction and CATCH team consult put in. Denies any ETOH or drug use.      #Mood Disorder  -Valium 10mg BID PRN    -Trazodone 50mg bedtime PRN for insomnia    -Haldol 2mg Q6 PRN for agitation    -Tylenol PRN for pain    #Home meds  -Oxycodone PRN  -Albuterol PRN  -Losartan        #Agitation  -for agitation not amenable to verbal redirection, may give haldol 2 mg q6h prn, ativan 1 mg q6h prn, benadryl 25 mg q6h prn with escalation to IM if pt is a danger to self or/and others with repeat EKG to ensure QTc <500 ms.    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 Of note, son and father discovered that pill bottles which were recently filled were almost 2/3s empty and strongly believes this was a suicide attempt.    Patient seen and evaluated on IPP with Attending psychiatrist Dr. Murguia 4/14/25. Patient adamantly states that she did not have a suicide attempt. states she has chronic insomnia, did not sleep for 2 days, took and extra Ambien. Denies suicidal/homicidal ideations. Denies depression. States she has some anxiety but take valium for it. Presents as evasive. Appears to be minimizing her symptoms and the fact that she overdosed. States she was just sleeping. Will continue to monitor. At this time team unsure if patient is safe to go home. Attending discussed possible adjustments to patients medication. Addiction and CATCH team consult put in. Denies any ETOH or drug use.      #Mood Disorder  -Valium 10mg BID PRN    -Trazodone 50mg bedtime PRN for insomnia    -Haldol 2mg Q6 PRN for agitation    -Tylenol PRN for pain    #Home meds  -Oxycodone PRN  -Albuterol PRN  -Losartan    #Opioid abuse  -Addiction  -CATCH team        #Agitation  -for agitation not amenable to verbal redirection, may give haldol 2 mg q6h prn, ativan 1 mg q6h prn, benadryl 25 mg q6h prn with escalation to IM if pt is a danger to self or/and others with repeat EKG to ensure QTc <500 ms.    -PLEASE AVOID BENZOS d/t potential/hx of miss use of pain meds

## 2025-04-14 NOTE — BH SAFETY PLAN - WARNING SIGN 1
The situation is that I my son worried about me and thought I overdosed. I do take my medication and I fell and he thought I wasn't ok. I have loss of sleep sometimes. I am L[employer] day and I wouldn't hurt myself. It was a mistake on his part that I needed to come here. I had a son that passed and a brother that passed. I sometimes miss them and grieve for them. The situation is that my son was worried about me and thought I overdosed. I took my medication and I fell. I have loss of sleep sometimes. I am [place] and I wouldn't hurt myself. I had a son that passed and a brother that passed. I sometimes miss them and grieve for them. I love family and everything is great. I came here for him but I didn't want to be here or need to be here.

## 2025-04-14 NOTE — BH SOCIAL WORK INITIAL PSYCHOSOCIAL EVALUATION - OTHER PAST PSYCHIATRIC HISTORY (INCLUDE DETAILS REGARDING ONSET, COURSE OF ILLNESS, INPATIENT/OUTPATIENT TREATMENT)
PPHx of Depression and Anxiety, no recent psychiatric hospitalizations (per son, she might have had one hospitalization in her teens, but is unsure), likely SA via OD back in January, no hx of NSSIB, no hx of violence/legal issues,

## 2025-04-14 NOTE — BH INPATIENT PSYCHIATRY PROGRESS NOTE - NSBHMETABOLIC_PSY_ALL_CORE_FT
BMI: BMI (kg/m2): 19 (04-12-25 @ 17:29)  HbA1c:   Glucose: POCT Blood Glucose.: 87 mg/dL (04-12-25 @ 03:35)    BP: 128/89 (04-14-25 @ 08:02) (126/85 - 159/89)Vital Signs Last 24 Hrs  T(C): 36.9 (04-14-25 @ 08:02), Max: 37.1 (04-13-25 @ 15:24)  T(F): 98.5 (04-14-25 @ 08:02), Max: 98.7 (04-13-25 @ 15:24)  HR: 97 (04-14-25 @ 08:02) (89 - 97)  BP: 128/89 (04-14-25 @ 08:02) (126/85 - 128/89)  BP(mean): --  RR: 18 (04-14-25 @ 06:23) (18 - 18)  SpO2: --      Lipid Panel:

## 2025-04-14 NOTE — BH INPATIENT PSYCHIATRY PROGRESS NOTE - NSBHCHARTREVIEWVS_PSY_A_CORE FT
Vital Signs Last 24 Hrs  T(C): 36.9 (04-14-25 @ 08:02), Max: 37.1 (04-13-25 @ 15:24)  T(F): 98.5 (04-14-25 @ 08:02), Max: 98.7 (04-13-25 @ 15:24)  HR: 97 (04-14-25 @ 08:02) (89 - 97)  BP: 128/89 (04-14-25 @ 08:02) (126/85 - 128/89)  BP(mean): --  RR: 18 (04-14-25 @ 06:23) (18 - 18)  SpO2: --

## 2025-04-15 DIAGNOSIS — F12.10 CANNABIS ABUSE, UNCOMPLICATED: ICD-10-CM

## 2025-04-15 LAB
A1C WITH ESTIMATED AVERAGE GLUCOSE RESULT: 5.5 % — SIGNIFICANT CHANGE UP (ref 4–5.6)
ALBUMIN SERPL ELPH-MCNC: 3.8 G/DL — SIGNIFICANT CHANGE UP (ref 3.5–5.2)
ALP SERPL-CCNC: 110 U/L — SIGNIFICANT CHANGE UP (ref 30–115)
ALT FLD-CCNC: 9 U/L — SIGNIFICANT CHANGE UP (ref 0–41)
ANION GAP SERPL CALC-SCNC: 8 MMOL/L — SIGNIFICANT CHANGE UP (ref 7–14)
AST SERPL-CCNC: 15 U/L — SIGNIFICANT CHANGE UP (ref 0–41)
BILIRUB SERPL-MCNC: <0.2 MG/DL — SIGNIFICANT CHANGE UP (ref 0.2–1.2)
BUN SERPL-MCNC: 13 MG/DL — SIGNIFICANT CHANGE UP (ref 10–20)
CALCIUM SERPL-MCNC: 8.9 MG/DL — SIGNIFICANT CHANGE UP (ref 8.4–10.5)
CHLORIDE SERPL-SCNC: 103 MMOL/L — SIGNIFICANT CHANGE UP (ref 98–110)
CHOLEST SERPL-MCNC: 188 MG/DL — SIGNIFICANT CHANGE UP
CO2 SERPL-SCNC: 26 MMOL/L — SIGNIFICANT CHANGE UP (ref 17–32)
CREAT SERPL-MCNC: 0.7 MG/DL — SIGNIFICANT CHANGE UP (ref 0.7–1.5)
DRUG SCREEN 1, URINE RESULT: SIGNIFICANT CHANGE UP
EGFR: 98 ML/MIN/1.73M2 — SIGNIFICANT CHANGE UP
EGFR: 98 ML/MIN/1.73M2 — SIGNIFICANT CHANGE UP
ESTIMATED AVERAGE GLUCOSE: 111 MG/DL — SIGNIFICANT CHANGE UP (ref 68–114)
GLUCOSE SERPL-MCNC: 97 MG/DL — SIGNIFICANT CHANGE UP (ref 70–99)
HDLC SERPL-MCNC: 75 MG/DL — SIGNIFICANT CHANGE UP
LDLC SERPL-MCNC: 95 MG/DL — SIGNIFICANT CHANGE UP
LIPID PNL WITH DIRECT LDL SERPL: 95 MG/DL — SIGNIFICANT CHANGE UP
NONHDLC SERPL-MCNC: 113 MG/DL — SIGNIFICANT CHANGE UP
POTASSIUM SERPL-MCNC: 5 MMOL/L — SIGNIFICANT CHANGE UP (ref 3.5–5)
POTASSIUM SERPL-SCNC: 5 MMOL/L — SIGNIFICANT CHANGE UP (ref 3.5–5)
PROT SERPL-MCNC: 6.7 G/DL — SIGNIFICANT CHANGE UP (ref 6–8)
SODIUM SERPL-SCNC: 137 MMOL/L — SIGNIFICANT CHANGE UP (ref 135–146)
TRIGL SERPL-MCNC: 103 MG/DL — SIGNIFICANT CHANGE UP
TSH SERPL-MCNC: 2.94 UIU/ML — SIGNIFICANT CHANGE UP (ref 0.27–4.2)

## 2025-04-15 PROCEDURE — 99232 SBSQ HOSP IP/OBS MODERATE 35: CPT

## 2025-04-15 PROCEDURE — 99221 1ST HOSP IP/OBS SF/LOW 40: CPT

## 2025-04-15 RX ADMIN — OXYCODONE HYDROCHLORIDE 10 MILLIGRAM(S): 30 TABLET ORAL at 10:31

## 2025-04-15 RX ADMIN — OXYCODONE HYDROCHLORIDE 10 MILLIGRAM(S): 30 TABLET ORAL at 18:34

## 2025-04-15 RX ADMIN — DIAZEPAM 10 MILLIGRAM(S): 2 TABLET ORAL at 22:54

## 2025-04-15 RX ADMIN — OXYCODONE HYDROCHLORIDE 10 MILLIGRAM(S): 30 TABLET ORAL at 03:30

## 2025-04-15 RX ADMIN — Medication 50 MILLIGRAM(S): at 22:54

## 2025-04-15 RX ADMIN — LOSARTAN POTASSIUM 100 MILLIGRAM(S): 100 TABLET, FILM COATED ORAL at 08:03

## 2025-04-15 RX ADMIN — OXYCODONE HYDROCHLORIDE 10 MILLIGRAM(S): 30 TABLET ORAL at 01:43

## 2025-04-15 RX ADMIN — OXYCODONE HYDROCHLORIDE 10 MILLIGRAM(S): 30 TABLET ORAL at 11:20

## 2025-04-15 RX ADMIN — OXYCODONE HYDROCHLORIDE 10 MILLIGRAM(S): 30 TABLET ORAL at 18:32

## 2025-04-15 RX ADMIN — DIAZEPAM 10 MILLIGRAM(S): 2 TABLET ORAL at 08:02

## 2025-04-15 RX ADMIN — Medication 20 MILLIGRAM(S): at 08:02

## 2025-04-15 NOTE — BH INPATIENT PSYCHIATRY PROGRESS NOTE - CURRENT MEDICATION
MEDICATIONS  (STANDING):  famotidine    Tablet 20 milliGRAM(s) Oral daily  losartan 100 milliGRAM(s) Oral daily    MEDICATIONS  (PRN):  acetaminophen     Tablet .. 650 milliGRAM(s) Oral every 6 hours PRN Mild Pain (1 - 3), Moderate Pain (4 - 6)  albuterol    90 MICROgram(s) HFA Inhaler 1 Puff(s) Inhalation every 4 hours PRN cough, sob  diazepam    Tablet 10 milliGRAM(s) Oral two times a day PRN anxiety  haloperidol     Tablet 2 milliGRAM(s) Oral every 6 hours PRN agitation  oxyCODONE    IR 10 milliGRAM(s) Oral every 8 hours PRN Severe Pain (7 - 10)  traZODone 50 milliGRAM(s) Oral at bedtime PRN insomnia

## 2025-04-15 NOTE — PHYSICAL THERAPY INITIAL EVALUATION ADULT - NSACTIVITYREC_GEN_A_PT
Patient independent W/ bed mobility, transfers,  and  Supervision W/ gait W/o AD , Skill PT not Recommended at this time.  Reconsult Skill PT in future as appropriated  Am Pac Mobility Score :22

## 2025-04-15 NOTE — BH INPATIENT PSYCHIATRY PROGRESS NOTE - NSBHCHARTREVIEWVS_PSY_A_CORE FT
Vital Signs Last 24 Hrs  T(C): 36.7 (04-15-25 @ 08:06), Max: 37.1 (04-14-25 @ 15:29)  T(F): 98 (04-15-25 @ 08:06), Max: 98.8 (04-14-25 @ 15:29)  HR: 102 (04-15-25 @ 08:06) (90 - 102)  BP: 139/86 (04-15-25 @ 08:06) (122/85 - 139/86)  BP(mean): --  RR: --  SpO2: --     Vital Signs Last 24 Hrs  T(C): 36.9 (04-15-25 @ 15:34), Max: 36.9 (04-15-25 @ 15:34)  T(F): 98.4 (04-15-25 @ 15:34), Max: 98.4 (04-15-25 @ 15:34)  HR: 91 (04-15-25 @ 15:34) (91 - 91)  BP: 113/77 (04-15-25 @ 15:34) (113/77 - 113/77)  BP(mean): --  RR: --  SpO2: --

## 2025-04-15 NOTE — PHYSICAL THERAPY INITIAL EVALUATION ADULT - ADDITIONAL COMMENTS
Pt lives with  in a private home with ~ 5 steps to enter with RT  handrails and 14 steps inside with  RT   handrail. As per pt She was independent W/ all functional activity PTA W/o AD ad and has RW and SC if Need to use it .

## 2025-04-15 NOTE — BH INPATIENT PSYCHIATRY PROGRESS NOTE - NSBHFUPINTERVALHXFT_PSY_A_CORE
Patient seen and evaluated 4/15/25. On approach patient calm and semi cooperative, pleasant. No agitation or aggression noted. In good behavorial control. Presents as evasive. Patient states that she is "trying to keep a positive attitude" and she "is a happy person". Appears to be minimizing her symptoms and the fact that she overdosed. When asked about medication quantity that is missing, patient states that her pharmacist owed her medication and only filled half the prescription and that is why half the medication is missing from the bottle. Patient adamantly states that she did not have a suicide attempt. Denies suicidal/homicidal ideations. Denies depression. Will continue to monitor. Attending discussed possible adjustments to patients medication. Seen and evaluated by addiction and CATCH team. Denies any ETOH or drug use. At this time team unsure if patient is safe to go home. Patient seen and evaluated 4/15/25. On approach patient calm and semi cooperative, pleasant. No agitation or aggression noted. In good behavorial control. Presents as evasive. Patient states that she is "trying to keep a positive attitude" and she "is a happy person". Appears to be minimizing her symptoms and the fact that she overdosed. When asked about medication quantity that is missing, patient states that her pharmacist owed her medication and only filled half the prescription and that is why half the medication is missing from the bottle. Patient adamantly states that she did not have a suicide attempt. Denies suicidal/homicidal ideations. Denies depression. Will continue to monitor. Attending discussed possible adjustments to patients medication. Seen and evaluated by addiction and CATCH team. Denies any ETOH or drug use.

## 2025-04-15 NOTE — CONSULT NOTE ADULT - SUPERVISING ATTENDING
"HPI   Chief Complaint   Patient presents with    Psychiatric Evaluation       Patient is a very pleasant 26-year-old with a long history of depression.  She has been on numerous medications but ends up not taking them as recommended because she does not like being on medications and would rather be normal on her own instead of having medications help her.  She also says she does not like the way they feel in her mouth going down her throat and basically not anything about them.  She does have a history of self-harm and most recently has found that cutting her hands and feet with nail clippers helps a bit.  Does not tonight have a plan that would allow her to harm herself or anyone else, but does feel very afraid that these \"dark thoughts, will find her wherever she is trying to hide and that the best thing she can do is come in to try to have someone objective try to sort this out and give her some direction.            Patient History   History reviewed. No pertinent past medical history.  History reviewed. No pertinent surgical history.  No family history on file.  Social History     Tobacco Use    Smoking status: Never     Passive exposure: Never    Smokeless tobacco: Never   Substance Use Topics    Alcohol use: Not Currently    Drug use: Never       Physical Exam   ED Triage Vitals [10/03/24 2308]   Temperature Heart Rate Respirations BP   36.3 °C (97.3 °F) 91 18 129/90      SpO2 Temp src Heart Rate Source Patient Position   100 % -- -- --      BP Location FiO2 (%)     -- --       Physical Exam  Vitals and nursing note reviewed.   HENT:      Head: Normocephalic and atraumatic.      Right Ear: Tympanic membrane and ear canal normal.      Left Ear: Tympanic membrane and ear canal normal.      Nose: Nose normal.      Mouth/Throat:      Mouth: Mucous membranes are moist.   Cardiovascular:      Rate and Rhythm: Normal rate.   Pulmonary:      Effort: Pulmonary effort is normal.      Breath sounds: Normal breath " Jenniferkin sounds.   Abdominal:      General: Abdomen is flat.      Palpations: Abdomen is soft.   Musculoskeletal:         General: Normal range of motion.      Cervical back: Normal range of motion.   Skin:     General: Skin is warm and dry.   Neurological:      General: No focal deficit present.      Mental Status: She is alert.   Psychiatric:      Comments: Very tearful.  Poor eye contact.           ED Course & MDM                  No data recorded     Emiliano Coma Scale Score: 15 (10/04/24 0000 : Gopal Martinez RN)                           Medical Decision Making  EKG interpreted by me: Sinus rhythm with rate of 68.  Intervals and axes are normal.    Patient is currently medically stable for psychiatric evaluation and placement if indicated.    [facility]AT has evaluated the patient and are in agreement that she truly needs to be inpatient due to her profound depression.        Procedure  Procedures     Eliecer Hodge MD  10/04/24 0418       Eliecer Hodge MD  10/04/24 0633

## 2025-04-15 NOTE — CONSULT NOTE ADULT - PROBLEM SELECTOR RECOMMENDATION 9
After evaluation at this time continue current protocol. Pts concerns addressed  Pt will be monitored and supportive care provided.  No other changes to medical care plan for withdrawals.  Monitor labs/electrolytes as needed.    -Counseling provided   CATCH team involved for aftercare and pt will follow up with aftercare. to outpatient vs rehab.

## 2025-04-15 NOTE — PHYSICAL THERAPY INITIAL EVALUATION ADULT - GENERAL OBSERVATIONS, REHAB EVAL
8:35 -9:00 Chart reviewed. Order received.  Patient is ok to be  seen for PT,confirmed with RN. pt encountered   Sitting at Day room   denies pain, and agrees to participate in session, ,NAD.

## 2025-04-15 NOTE — CONSULT NOTE ADULT - SUBJECTIVE AND OBJECTIVE BOX
MEDICAL TOXICOLOGY CONSULT    HPI:     61 y/o M Hx of fibromyalgia, restless leg syndrome, CVA presented after over presented obtunded at home around 3am with Butorphanol, diazepam, oxycodone. Pt responded to 2 doses of naloxone and was observed over night in the ED. Now has returned to baseline mental status. Rate 56 QRS 82  repeat EKG rate 59 QRS 78      PAST MEDICAL & SURGICAL HISTORY:  Fibromyalgia      Lyme disease      Back pain, chronic      Polysubstance dependence      Restless leg syndrome      Right hip pain      H/O fracture of rib  10+ yeras ago      Former smoker      Cerebrovascular accident (CVA)  in 2016 and 2018 NO residual      History of appendectomy      History of  section      Fracture, radius      History of gastric surgery          MEDICATION HISTORY:      FAMILY HISTORY:  No pertinent family history in first degree relatives        PHYSICAL EXAM  Vital Signs Last 24 Hrs  T(C): 36.6 (2025 03:22), Max: 36.6 (2025 03:22)  T(F): 97.9 (2025 03:22), Max: 97.9 (2025 03:22)  HR: 51 (2025 05:30) (51 - 52)  BP: 159/89 (2025 05:30) (128/94 - 159/89)  BP(mean): --  RR: 17 (2025 05:30) (17 - 17)  SpO2: 100% (2025 05:30) (100% - 100%)    Parameters below as of 2025 03:22  Patient On (Oxygen Delivery Method): room air        SIGNIFICANT LABORATORY STUDIES:                        13.3   8.37  )-----------( 282      ( 2025 03:54 )             43.8       04-12    142  |  106  |  22[H]  ----------------------------<  95  3.5   |  21  |  1.1    Ca    9.4      2025 03:54            Urinalysis Basic - ( 2025 03:54 )    Color: x / Appearance: x / SG: x / pH: x  Gluc: 95 mg/dL / Ketone: x  / Bili: x / Urobili: x   Blood: x / Protein: x / Nitrite: x   Leuk Esterase: x / RBC: x / WBC x   Sq Epi: x / Non Sq Epi: x / Bacteria: x        Anion Gap: 15[H]  @ 03:54  CK: --  @ 03:54  Troponin:  --   @ 03:54  Pro-BNP:  --   @ 03:54  VBG:  --   @ 03:54  Carboxyhemoglobin %:  --   03:54  Methemoglobin %:  --   @ 03:54  Osmolality Serum:  --   @ 03:54  Aspirin Level: <0.3[L]   @ 03:54  Acetaminophen Level:  11.0   03:54  Ethanol Level:  --   @ 03:54  Digoxin Level:  --   @ 03:54  Phenytoin Level:  --   @ 03:54  Carbamazepine level:  --   @ 03:54  Lamotrigine level:  --   @ 03:54      RADIOLOGIC STUDIES  
    Pt interviewed, examined and EMR chart reviewed.  Pt admits to using marijuana at bedtime for anxiety/isnomnia.   Last use day prior to admission   Denies any Hx of withdrawal   variable periods of sobriety in the past.  Has been in detox before _____yes,   __X___No    SOCIAL HISTORY:    REVIEW OF SYSTEMS:    Constitutional: No fever, weight loss or fatigue  ENT:  No difficulty hearing, tinnitus, vertigo; No sinus or throat pain  Neck: No pain or stiffness  Respiratory: No cough, wheezing, chills or hemoptysis  Cardiovascular: No chest pain, palpitations, shortness of breath, dizziness or leg swelling  Gastrointestinal: No abdominal or epigastric pain. No nausea, vomiting or hematemesis; No diarrhea or constipation. No melena or hematochezia.  Neurological: No headaches, memory loss, loss of strength, numbness or tremors  Musculoskeletal: No joint pain or swelling; No muscle, back or extremity pain  Psychiatric: SEE HPI    MEDICATIONS  (STANDING):  famotidine    Tablet 20 milliGRAM(s) Oral daily  losartan 100 milliGRAM(s) Oral daily    MEDICATIONS  (PRN):  acetaminophen     Tablet .. 650 milliGRAM(s) Oral every 6 hours PRN Mild Pain (1 - 3), Moderate Pain (4 - 6)  albuterol    90 MICROgram(s) HFA Inhaler 1 Puff(s) Inhalation every 4 hours PRN cough, sob  diazepam    Tablet 10 milliGRAM(s) Oral two times a day PRN anxiety  haloperidol     Tablet 2 milliGRAM(s) Oral every 6 hours PRN agitation  oxyCODONE    IR 10 milliGRAM(s) Oral every 8 hours PRN Severe Pain (7 - 10)  traZODone 50 milliGRAM(s) Oral at bedtime PRN insomnia      Vital Signs Last 24 Hrs  T(C): 36.7 (15 Apr 2025 08:06), Max: 37.1 (14 Apr 2025 15:29)  T(F): 98 (15 Apr 2025 08:06), Max: 98.8 (14 Apr 2025 15:29)  HR: 102 (15 Apr 2025 08:06) (90 - 102)  BP: 139/86 (15 Apr 2025 08:06) (122/85 - 139/86)  BP(mean): --  RR: --  SpO2: --        PHYSICAL EXAM:    Constitutional: NAD, well-groomed, well-developed  HEENT: PERRLA, EOMI, Normal Hearing,   Neck: No LAD, No JVD  Back: Normal spine flexure, No CVA tenderness  Respiratory: CTAB/L  Cardiovascular: S1 and S2, RRR, no M/G/R  Gastrointestinal: BS+, soft, NT/ND  Extremities: No peripheral edema  Neurological: A/O x 3, no focal deficits    LABS:              Drug Screen Urine:  Alcohol Level        RADIOLOGY & ADDITIONAL STUDIES:

## 2025-04-15 NOTE — PHYSICAL THERAPY INITIAL EVALUATION ADULT - PERTINENT HX OF CURRENT PROBLEM, REHAB EVAL
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

## 2025-04-15 NOTE — BH INPATIENT PSYCHIATRY PROGRESS NOTE - NSBHASSESSSUMMFT_PSY_ALL_CORE
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 Of note, son and father discovered that pill bottles which were recently filled were almost 2/3s empty and strongly believes this was a suicide attempt.    Patient seen and evaluated 4/15/25. On approach patient calm and semi cooperative, pleasant. Patient states that she is "trying to keep a positive attitude" and she "is a happy person". States she has chronic insomnia, did not sleep for 2 days, took and extra Ambien and has anxiety that she takes valium for. Appears to be minimizing her symptoms and the fact that she overdosed. When asked about medication quantity that is missing, patient states that her pharmacist owed her medication and only filled half the prescription and that is why. Patient adamantly states that she did not have a suicide attempt. Denies suicidal/homicidal ideations. Denies depression. Will continue to monitor. Attending discussed possible adjustments to patients medication. Seen and evaluated by addiction and CATCH team. Denies any ETOH or drug use. At this time team unsure if patient is safe to go home.    #Mood Disorder  -Valium 10mg BID PRN    -Trazodone 50mg bedtime PRN for insomnia    -Haldol 2mg Q6 PRN for agitation    -Tylenol PRN for pain    #Home meds  -Oxycodone PRN  -Albuterol PRN  -Losartan    #Opioid abuse  -Addiction  -CATCH team        #Agitation  -for agitation not amenable to verbal redirection, may give haldol 2 mg q6h prn, ativan 1 mg q6h prn, benadryl 25 mg q6h prn with escalation to IM if pt is a danger to self or/and others with repeat EKG to ensure QTc <500 ms.    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 Of note, son and father discovered that pill bottles which were recently filled were almost 2/3s empty and strongly believes this was a suicide attempt.    Patient seen and evaluated 4/15/25. On approach patient calm and semi cooperative, pleasant. Patient states that she is "trying to keep a positive attitude" and she "is a happy person". States she has chronic insomnia, did not sleep for 2 days, took and extra Ambien and has anxiety that she takes valium for. Appears to be minimizing her symptoms and the fact that she overdosed. When asked about medication quantity that is missing, patient states that her pharmacist owed her medication and only filled half the prescription and that is why. Patient adamantly states that she did not have a suicide attempt. Denies suicidal/homicidal ideations. Denies depression. Will continue to monitor. Attending discussed possible adjustments to patients medication. Seen and evaluated by addiction and CATCH team. Denies any ETOH or drug use. Have left voicemails for outpatient providers. At this time, patient is not safe for discharge due to access to multiple controlled substances that are potentially lethal in overdose and due to poor insight.    #Mood Disorder  -Valium 10mg BID PRN    -Trazodone 50mg bedtime PRN for insomnia    -Haldol 2mg Q6 PRN for agitation    -Tylenol PRN for pain    #Home meds  -Oxycodone PRN  -Albuterol PRN  -Losartan    #Opioid abuse  -Addiction  -CATCH team    #Agitation  -for agitation not amenable to verbal redirection, may give haldol 2 mg q6h prn, ativan 1 mg q6h prn, benadryl 25 mg q6h prn with escalation to IM if pt is a danger to self or/and others with repeat EKG to ensure QTc <500 ms.    -PLEASE AVOID BENZOS d/t potential/hx of miss use of pain meds

## 2025-04-15 NOTE — BH INPATIENT PSYCHIATRY PROGRESS NOTE - PRN MEDS
MEDICATIONS  (PRN):  acetaminophen     Tablet .. 650 milliGRAM(s) Oral every 6 hours PRN Mild Pain (1 - 3), Moderate Pain (4 - 6)  albuterol    90 MICROgram(s) HFA Inhaler 1 Puff(s) Inhalation every 4 hours PRN cough, sob  diazepam    Tablet 10 milliGRAM(s) Oral two times a day PRN anxiety  haloperidol     Tablet 2 milliGRAM(s) Oral every 6 hours PRN agitation  oxyCODONE    IR 10 milliGRAM(s) Oral every 8 hours PRN Severe Pain (7 - 10)  traZODone 50 milliGRAM(s) Oral at bedtime PRN insomnia

## 2025-04-15 NOTE — BH INPATIENT PSYCHIATRY PROGRESS NOTE - NSBHMETABOLIC_PSY_ALL_CORE_FT
BMI: BMI (kg/m2): 19 (04-12-25 @ 17:29)  HbA1c:   Glucose: POCT Blood Glucose.: 87 mg/dL (04-12-25 @ 03:35)    BP: 139/86 (04-15-25 @ 08:06) (122/85 - 158/99)Vital Signs Last 24 Hrs  T(C): 36.7 (04-15-25 @ 08:06), Max: 37.1 (04-14-25 @ 15:29)  T(F): 98 (04-15-25 @ 08:06), Max: 98.8 (04-14-25 @ 15:29)  HR: 102 (04-15-25 @ 08:06) (90 - 102)  BP: 139/86 (04-15-25 @ 08:06) (122/85 - 139/86)  BP(mean): --  RR: --  SpO2: --      Lipid Panel: Date/Time: 04-15-25 @ 08:39  Cholesterol, Serum: 188  LDL Cholesterol Calculated: 95  HDL Cholesterol, Serum: 75  Total Cholesterol/HDL Ration Measurement: --  Triglycerides, Serum: 103   BMI: BMI (kg/m2): 19 (04-12-25 @ 17:29)  HbA1c: A1C with Estimated Average Glucose Result: 5.5 % (04-15-25 @ 08:39)    Glucose: POCT Blood Glucose.: 87 mg/dL (04-12-25 @ 03:35)    BP: 113/77 (04-15-25 @ 15:34) (113/77 - 139/86)Vital Signs Last 24 Hrs  T(C): 36.9 (04-15-25 @ 15:34), Max: 36.9 (04-15-25 @ 15:34)  T(F): 98.4 (04-15-25 @ 15:34), Max: 98.4 (04-15-25 @ 15:34)  HR: 91 (04-15-25 @ 15:34) (91 - 91)  BP: 113/77 (04-15-25 @ 15:34) (113/77 - 113/77)  BP(mean): --  RR: --  SpO2: --      Lipid Panel: Date/Time: 04-15-25 @ 08:39  Cholesterol, Serum: 188  LDL Cholesterol Calculated: 95  HDL Cholesterol, Serum: 75  Total Cholesterol/HDL Ration Measurement: --  Triglycerides, Serum: 103

## 2025-04-16 PROCEDURE — 99231 SBSQ HOSP IP/OBS SF/LOW 25: CPT

## 2025-04-16 RX ORDER — OXYCODONE HYDROCHLORIDE 30 MG/1
5 TABLET ORAL EVERY 8 HOURS
Refills: 0 | Status: DISCONTINUED | OUTPATIENT
Start: 2025-04-16 | End: 2025-04-16

## 2025-04-16 RX ORDER — SENNA 187 MG
2 TABLET ORAL ONCE
Refills: 0 | Status: COMPLETED | OUTPATIENT
Start: 2025-04-16 | End: 2025-04-16

## 2025-04-16 RX ORDER — ESCITALOPRAM OXALATE 20 MG/1
5 TABLET ORAL DAILY
Refills: 0 | Status: DISCONTINUED | OUTPATIENT
Start: 2025-04-16 | End: 2025-04-24

## 2025-04-16 RX ORDER — OXYCODONE HYDROCHLORIDE 30 MG/1
10 TABLET ORAL EVERY 8 HOURS
Refills: 0 | Status: DISCONTINUED | OUTPATIENT
Start: 2025-04-16 | End: 2025-04-23

## 2025-04-16 RX ORDER — DIAZEPAM 2 MG/1
5 TABLET ORAL
Refills: 0 | Status: DISCONTINUED | OUTPATIENT
Start: 2025-04-16 | End: 2025-04-21

## 2025-04-16 RX ADMIN — Medication 20 MILLIGRAM(S): at 08:06

## 2025-04-16 RX ADMIN — ESCITALOPRAM OXALATE 5 MILLIGRAM(S): 20 TABLET ORAL at 12:25

## 2025-04-16 RX ADMIN — OXYCODONE HYDROCHLORIDE 10 MILLIGRAM(S): 30 TABLET ORAL at 01:18

## 2025-04-16 RX ADMIN — OXYCODONE HYDROCHLORIDE 10 MILLIGRAM(S): 30 TABLET ORAL at 02:54

## 2025-04-16 RX ADMIN — Medication 50 MILLIGRAM(S): at 22:18

## 2025-04-16 RX ADMIN — LOSARTAN POTASSIUM 100 MILLIGRAM(S): 100 TABLET, FILM COATED ORAL at 08:05

## 2025-04-16 RX ADMIN — OXYCODONE HYDROCHLORIDE 10 MILLIGRAM(S): 30 TABLET ORAL at 09:09

## 2025-04-16 RX ADMIN — Medication 2 TABLET(S): at 20:02

## 2025-04-16 RX ADMIN — OXYCODONE HYDROCHLORIDE 10 MILLIGRAM(S): 30 TABLET ORAL at 17:57

## 2025-04-16 RX ADMIN — DIAZEPAM 5 MILLIGRAM(S): 2 TABLET ORAL at 22:18

## 2025-04-16 RX ADMIN — OXYCODONE HYDROCHLORIDE 10 MILLIGRAM(S): 30 TABLET ORAL at 17:58

## 2025-04-16 RX ADMIN — DIAZEPAM 10 MILLIGRAM(S): 2 TABLET ORAL at 08:05

## 2025-04-16 NOTE — BH INPATIENT PSYCHIATRY PROGRESS NOTE - NSBHASSESSSUMMFT_PSY_ALL_CORE
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 Of note, son and father discovered that pill bottles which were recently filled were almost 2/3s empty and strongly believes this was a suicide attempt.    Patient seen and evaluated 4/16/25. On approach patient calm and semi cooperative, pleasant. Patient states that she is "trying to keep a positive attitude" and she "is a happy person". States she has chronic insomnia, did not sleep for 2 days, took and extra Ambien and has anxiety that she takes valium for. Appears to be minimizing her symptoms and the fact that she overdosed. When asked about medication quantity that is missing, patient states that her pharmacist owed her medication and only filled half the prescription and that is why. Patient adamantly states that she did not have a suicide attempt. Denies suicidal/homicidal ideations. Denies depression. Will continue to monitor. Discussed possible adjustments to patients medication. Seen and evaluated by addiction and CATCH team. Denies any ETOH or drug use. Have left voicemails for outpatient providers. At this time, patient is not safe for discharge due to access to multiple controlled substances that are potentially lethal in overdose and due to poor insight.     #Mood Disorder  -Valium 10mg BID PRN    -Trazodone 50mg bedtime PRN for insomnia    -Haldol 2mg Q6 PRN for agitation    -Tylenol PRN for pain    #Home meds  -Oxycodone PRN  -Albuterol PRN  -Losartan    #Opioid abuse  -Addiction  -CATCH team    #Agitation  -for agitation not amenable to verbal redirection, may give haldol 2 mg q6h prn, ativan 1 mg q6h prn, benadryl 25 mg q6h prn with escalation to IM if pt is a danger to self or/and others with repeat EKG to ensure QTc <500 ms.    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 Of note, son and father discovered that pill bottles which were recently filled were almost 2/3s empty and strongly believes this was a suicide attempt.    Patient seen and evaluated 4/16/25. On approach patient calm and semi cooperative, pleasant. Appears to be minimizing her symptoms and the fact that she overdosed. Patient adamantly states that she did not have a suicide attempt. Denies suicidal/homicidal ideations. Denies depression. Will continue to monitor. Discussed possible adjustments to patients medication. Seen and evaluated by addiction and CATCH team. Denies any ETOH or drug use. Have left voicemails for outpatient providers. At this time, patient is not safe for discharge due to access to multiple controlled substances that are potentially lethal in overdose and due to poor insight.     #Mood Disorder  -Valium 10mg BID PRN    -Trazodone 50mg bedtime PRN for insomnia    -Haldol 2mg Q6 PRN for agitation    -Tylenol PRN for pain    #Home meds  -Oxycodone PRN  -Albuterol PRN  -Losartan    #Opioid abuse  -Addiction  -CATCH team    #Agitation  -for agitation not amenable to verbal redirection, may give haldol 2 mg q6h prn, ativan 1 mg q6h prn, benadryl 25 mg q6h prn with escalation to IM if pt is a danger to self or/and others with repeat EKG to ensure QTc <500 ms.    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 Of note, son and father discovered that pill bottles which were recently filled were almost 2/3s empty and strongly believes this was a suicide attempt.    Patient seen and evaluated 4/16/25. Presents as evasive. Patient states that she is "trying to keep a positive attitude" and she "is a happy person". Continues to appears to be minimizing her symptoms and the fact that she overdosed. When asked about medication quantity that is missing, patient states that her pharmacist owed her medication and only filled half the prescription and that is why half the medication is missing from the bottle. Patient adamantly states that she did not have a suicide attempt. Denies suicidal/homicidal ideations. Denies depression. Will continue to monitor. Discussed possible adjustments to patients current medication and the addition of an SSRI for anxiety.     Attempted to call Psychiatrist, Dr. Walden [phone], left message to return call    #Mood Disorder  -Valium 10mg BID PRN. Decrease to 5mg BID    -Trazodone 50mg bedtime PRN for insomnia    -Haldol 2mg Q6 PRN for agitation    -Tylenol PRN for pain    #Home meds  -Oxycodone PRN  -Albuterol PRN  -Losartan    #Opioid abuse  -Addiction  -CATCH team    #Agitation  -for agitation not amenable to verbal redirection, may give haldol 2 mg q6h prn, ativan 1 mg q6h prn, benadryl 25 mg q6h prn with escalation to IM if pt is a danger to self or/and others with repeat EKG to ensure QTc <500 ms.    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 Of note, son and father discovered that pill bottles which were recently filled were almost 2/3s empty and strongly believes this was a suicide attempt.    Patient seen and evaluated 4/16/25. Presents as evasive. Patient states that she is "trying to keep a positive attitude" and she "is a happy person". Continues to appears to be minimizing her symptoms and the fact that she overdosed. When asked about medication quantity that is missing, patient states that her pharmacist owed her medication and only filled half the prescription and that is why half the medication is missing from the bottle. Patient adamantly states that she did not have a suicide attempt. Denies suicidal/homicidal ideations. Denies depression. Will continue to monitor. Discussed possible adjustments to patients current medication and the addition of an SSRI for anxiety.     Attempted to call Psychiatrist, Dr. Walden [phone], left message to return call    #Mood Disorder  -Valium 10mg BID PRN. Decrease to 5mg BID PRN    -Trazodone 50mg bedtime PRN for insomnia    -Haldol 2mg Q6 PRN for agitation    -Tylenol PRN for pain    #Home meds  -Oxycodone PRN  -Albuterol PRN  -Losartan    #Opioid abuse  -Addiction  -CATCH team    #Agitation  -for agitation not amenable to verbal redirection, may give haldol 2 mg q6h prn, ativan 1 mg q6h prn, benadryl 25 mg q6h prn with escalation to IM if pt is a danger to self or/and others with repeat EKG to ensure QTc <500 ms.    -PLEASE AVOID BENZOS d/t potential/hx of miss use of pain meds

## 2025-04-16 NOTE — BH INPATIENT PSYCHIATRY PROGRESS NOTE - PRN MEDS
MEDICATIONS  (PRN):  acetaminophen     Tablet .. 650 milliGRAM(s) Oral every 6 hours PRN Mild Pain (1 - 3), Moderate Pain (4 - 6)  albuterol    90 MICROgram(s) HFA Inhaler 1 Puff(s) Inhalation every 4 hours PRN cough, sob  diazepam    Tablet 10 milliGRAM(s) Oral two times a day PRN anxiety  haloperidol     Tablet 2 milliGRAM(s) Oral every 6 hours PRN agitation  traZODone 50 milliGRAM(s) Oral at bedtime PRN insomnia   MEDICATIONS  (PRN):  acetaminophen     Tablet .. 650 milliGRAM(s) Oral every 6 hours PRN Mild Pain (1 - 3), Moderate Pain (4 - 6)  albuterol    90 MICROgram(s) HFA Inhaler 1 Puff(s) Inhalation every 4 hours PRN cough, sob  diazepam    Tablet 10 milliGRAM(s) Oral two times a day PRN anxiety  haloperidol     Tablet 2 milliGRAM(s) Oral every 6 hours PRN agitation  oxyCODONE    IR 5 milliGRAM(s) Oral every 8 hours PRN Anxiety  traZODone 50 milliGRAM(s) Oral at bedtime PRN insomnia   MEDICATIONS  (PRN):  acetaminophen     Tablet .. 650 milliGRAM(s) Oral every 6 hours PRN Mild Pain (1 - 3), Moderate Pain (4 - 6)  albuterol    90 MICROgram(s) HFA Inhaler 1 Puff(s) Inhalation every 4 hours PRN cough, sob  diazepam    Tablet 5 milliGRAM(s) Oral two times a day PRN Anxiety  haloperidol     Tablet 2 milliGRAM(s) Oral every 6 hours PRN agitation  oxyCODONE    IR 10 milliGRAM(s) Oral every 8 hours PRN Severe Pain (7 - 10)  traZODone 50 milliGRAM(s) Oral at bedtime PRN insomnia

## 2025-04-16 NOTE — BH CHART NOTE - NSEVENTNOTEFT_PSY_ALL_CORE
Placed call to patients Pain management Dr. Wang [phone]. Spoke to Junie in office who works with doctor, who states doctor is away and will not be back until next week Monday. Writer addressed concerns for misuse of medication. Writer left name and contact information. States she will give doctor the message.

## 2025-04-16 NOTE — BH INPATIENT PSYCHIATRY PROGRESS NOTE - NSBHCHARTREVIEWVS_PSY_A_CORE FT
Vital Signs Last 24 Hrs  T(C): 36.6 (04-16-25 @ 08:05), Max: 36.9 (04-15-25 @ 15:34)  T(F): 97.9 (04-16-25 @ 08:05), Max: 98.4 (04-15-25 @ 15:34)  HR: 97 (04-16-25 @ 08:05) (91 - 97)  BP: 135/95 (04-16-25 @ 08:05) (113/77 - 135/95)  BP(mean): --  RR: --  SpO2: --     Vital Signs Last 24 Hrs  T(C): 36.4 (04-17-25 @ 08:35), Max: 36.5 (04-16-25 @ 15:28)  T(F): 97.6 (04-17-25 @ 08:35), Max: 97.7 (04-16-25 @ 15:28)  HR: 96 (04-17-25 @ 08:35) (96 - 108)  BP: 132/84 (04-17-25 @ 08:35) (115/84 - 132/84)  BP(mean): --  RR: --  SpO2: --

## 2025-04-16 NOTE — BH INPATIENT PSYCHIATRY PROGRESS NOTE - NSBHATTESTCOMMENTATTENDFT_PSY_A_CORE
I directly observed the history and MSE performed by the student. I reviewed the note and edited where appropriate. I agree with the assessment and plan as written. Patient continues to minimize overdose attempt. She deflects when confronted with facts suggestive of overdose such as requiring Narcan from EMS and large number of missing tablets from home prescriptions. Attempting to contact outpatient providers to discuss reducing access to lethal means, voicemails left. Patient is not safe for discharge at this time.   
I directly observed the history and MSE performed by the student. I reviewed the note and edited where appropriate. I agree with the assessment and plan as written. Patient continues to minimize overdose attempt. She deflects when confronted with facts suggestive of overdose such as requiring Narcan from EMS and large number of missing tablets from home prescriptions. Attempting to contact outpatient providers to discuss reducing access to lethal means, voicemails left. Patient is not safe for discharge at this time.

## 2025-04-16 NOTE — BH INPATIENT PSYCHIATRY PROGRESS NOTE - CURRENT MEDICATION
MEDICATIONS  (STANDING):  famotidine    Tablet 20 milliGRAM(s) Oral daily  losartan 100 milliGRAM(s) Oral daily    MEDICATIONS  (PRN):  acetaminophen     Tablet .. 650 milliGRAM(s) Oral every 6 hours PRN Mild Pain (1 - 3), Moderate Pain (4 - 6)  albuterol    90 MICROgram(s) HFA Inhaler 1 Puff(s) Inhalation every 4 hours PRN cough, sob  diazepam    Tablet 10 milliGRAM(s) Oral two times a day PRN anxiety  haloperidol     Tablet 2 milliGRAM(s) Oral every 6 hours PRN agitation  traZODone 50 milliGRAM(s) Oral at bedtime PRN insomnia   MEDICATIONS  (STANDING):  escitalopram 5 milliGRAM(s) Oral daily  famotidine    Tablet 20 milliGRAM(s) Oral daily  losartan 100 milliGRAM(s) Oral daily    MEDICATIONS  (PRN):  acetaminophen     Tablet .. 650 milliGRAM(s) Oral every 6 hours PRN Mild Pain (1 - 3), Moderate Pain (4 - 6)  albuterol    90 MICROgram(s) HFA Inhaler 1 Puff(s) Inhalation every 4 hours PRN cough, sob  diazepam    Tablet 10 milliGRAM(s) Oral two times a day PRN anxiety  haloperidol     Tablet 2 milliGRAM(s) Oral every 6 hours PRN agitation  oxyCODONE    IR 5 milliGRAM(s) Oral every 8 hours PRN Anxiety  traZODone 50 milliGRAM(s) Oral at bedtime PRN insomnia   MEDICATIONS  (STANDING):  escitalopram 5 milliGRAM(s) Oral daily  famotidine    Tablet 20 milliGRAM(s) Oral daily  losartan 100 milliGRAM(s) Oral daily    MEDICATIONS  (PRN):  acetaminophen     Tablet .. 650 milliGRAM(s) Oral every 6 hours PRN Mild Pain (1 - 3), Moderate Pain (4 - 6)  albuterol    90 MICROgram(s) HFA Inhaler 1 Puff(s) Inhalation every 4 hours PRN cough, sob  diazepam    Tablet 5 milliGRAM(s) Oral two times a day PRN Anxiety  haloperidol     Tablet 2 milliGRAM(s) Oral every 6 hours PRN agitation  oxyCODONE    IR 10 milliGRAM(s) Oral every 8 hours PRN Severe Pain (7 - 10)  traZODone 50 milliGRAM(s) Oral at bedtime PRN insomnia

## 2025-04-16 NOTE — BH INPATIENT PSYCHIATRY PROGRESS NOTE - NSBHFUPINTERVALHXFT_PSY_A_CORE
Patient seen and evaluated 4/16/25. On approach patient calm and semi cooperative, pleasant. No agitation or aggression noted. In good behavorial control. Presents as evasive. Patient states that she is "trying to keep a positive attitude" and she "is a happy person". Appears to be minimizing her symptoms and the fact that she overdosed. When asked about medication quantity that is missing, patient states that her pharmacist owed her medication and only filled half the prescription and that is why half the medication is missing from the bottle. Patient adamantly states that she did not have a suicide attempt. Denies suicidal/homicidal ideations. Denies depression. Will continue to monitor. Discussed possible adjustments to patients current medication and the addition of new medication. Seen and evaluated by addiction and CATCH team. Denies any ETOH or drug use.    Attempted to call Psychiatrist, Dr. Walden [phone], left message to return call Patient seen and evaluated 4/16/25. On approach patient calm and semi cooperative, pleasant. No agitation or aggression noted. In good behavorial control. Presents as evasive. Patient states that she is "trying to keep a positive attitude" and she "is a happy person". States she has chronic insomnia, did not sleep for 2 days, took and extra Ambien and has anxiety that she takes valium for. Appears to be minimizing her symptoms and the fact that she overdosed. When asked about medication quantity that is missing, patient states that her pharmacist owed her medication and only filled half the prescription and that is why half the medication is missing from the bottle. Patient adamantly states that she did not have a suicide attempt. Denies suicidal/homicidal ideations. Denies depression. Will continue to monitor. Discussed possible adjustments to patients current medication and the addition of new medication. Seen and evaluated by addiction and CATCH team. Denies any ETOH or drug use.    Attempted to call Psychiatrist, Dr. Walden [phone], left message to return call Patient seen and evaluated 4/16/25. On approach patient calm and semi cooperative, pleasant. No agitation or aggression noted. In good behavorial control. Presents as evasive. Patient states that she is "trying to keep a positive attitude" and she "is a happy person". Continues to state she has chronic insomnia, did not sleep for 2 days, took and extra Ambien and has anxiety that she takes valium for. Continues to appears to be minimizing her symptoms and the fact that she overdosed. When asked about medication quantity that is missing, patient states that her pharmacist owed her medication and only filled half the prescription and that is why half the medication is missing from the bottle. Patient adamantly states that she did not have a suicide attempt. Denies suicidal/homicidal ideations. Denies depression. Will continue to monitor. Discussed possible adjustments to patients current medication and the addition of an SSRI for anxiety. Patient visible on the unit engaging with peers and attending groups.     Attempted to call Psychiatrist, Dr. Walden [phone], left message to return call

## 2025-04-16 NOTE — BH INPATIENT PSYCHIATRY PROGRESS NOTE - NSBHMETABOLIC_PSY_ALL_CORE_FT
BMI: BMI (kg/m2): 19 (04-12-25 @ 17:29)  HbA1c: A1C with Estimated Average Glucose Result: 5.5 % (04-15-25 @ 08:39)    Glucose: POCT Blood Glucose.: 87 mg/dL (04-12-25 @ 03:35)    BP: 135/95 (04-16-25 @ 08:05) (113/77 - 139/86)Vital Signs Last 24 Hrs  T(C): 36.6 (04-16-25 @ 08:05), Max: 36.9 (04-15-25 @ 15:34)  T(F): 97.9 (04-16-25 @ 08:05), Max: 98.4 (04-15-25 @ 15:34)  HR: 97 (04-16-25 @ 08:05) (91 - 97)  BP: 135/95 (04-16-25 @ 08:05) (113/77 - 135/95)  BP(mean): --  RR: --  SpO2: --      Lipid Panel: Date/Time: 04-15-25 @ 08:39  Cholesterol, Serum: 188  LDL Cholesterol Calculated: 95  HDL Cholesterol, Serum: 75  Total Cholesterol/HDL Ration Measurement: --  Triglycerides, Serum: 103   BMI: BMI (kg/m2): 19 (04-12-25 @ 17:29)  HbA1c: A1C with Estimated Average Glucose Result: 5.5 % (04-15-25 @ 08:39)    Glucose: POCT Blood Glucose.: 87 mg/dL (04-12-25 @ 03:35)    BP: 132/84 (04-17-25 @ 08:35) (113/77 - 139/86)Vital Signs Last 24 Hrs  T(C): 36.4 (04-17-25 @ 08:35), Max: 36.5 (04-16-25 @ 15:28)  T(F): 97.6 (04-17-25 @ 08:35), Max: 97.7 (04-16-25 @ 15:28)  HR: 96 (04-17-25 @ 08:35) (96 - 108)  BP: 132/84 (04-17-25 @ 08:35) (115/84 - 132/84)  BP(mean): --  RR: --  SpO2: --      Lipid Panel: Date/Time: 04-15-25 @ 08:39  Cholesterol, Serum: 188  LDL Cholesterol Calculated: 95  HDL Cholesterol, Serum: 75  Total Cholesterol/HDL Ration Measurement: --  Triglycerides, Serum: 103

## 2025-04-17 PROCEDURE — 99231 SBSQ HOSP IP/OBS SF/LOW 25: CPT

## 2025-04-17 RX ADMIN — Medication 20 MILLIGRAM(S): at 08:22

## 2025-04-17 RX ADMIN — ESCITALOPRAM OXALATE 5 MILLIGRAM(S): 20 TABLET ORAL at 08:23

## 2025-04-17 RX ADMIN — OXYCODONE HYDROCHLORIDE 10 MILLIGRAM(S): 30 TABLET ORAL at 03:13

## 2025-04-17 RX ADMIN — OXYCODONE HYDROCHLORIDE 10 MILLIGRAM(S): 30 TABLET ORAL at 19:24

## 2025-04-17 RX ADMIN — Medication 50 MILLIGRAM(S): at 22:00

## 2025-04-17 RX ADMIN — LOSARTAN POTASSIUM 100 MILLIGRAM(S): 100 TABLET, FILM COATED ORAL at 08:22

## 2025-04-17 RX ADMIN — OXYCODONE HYDROCHLORIDE 10 MILLIGRAM(S): 30 TABLET ORAL at 11:18

## 2025-04-17 RX ADMIN — OXYCODONE HYDROCHLORIDE 10 MILLIGRAM(S): 30 TABLET ORAL at 03:43

## 2025-04-17 NOTE — BH INPATIENT PSYCHIATRY PROGRESS NOTE - PRN MEDS
MEDICATIONS  (PRN):  acetaminophen     Tablet .. 650 milliGRAM(s) Oral every 6 hours PRN Mild Pain (1 - 3), Moderate Pain (4 - 6)  albuterol    90 MICROgram(s) HFA Inhaler 1 Puff(s) Inhalation every 4 hours PRN cough, sob  diazepam    Tablet 5 milliGRAM(s) Oral two times a day PRN Anxiety  haloperidol     Tablet 2 milliGRAM(s) Oral every 6 hours PRN agitation  oxyCODONE    IR 10 milliGRAM(s) Oral every 8 hours PRN Severe Pain (7 - 10)  traZODone 50 milliGRAM(s) Oral at bedtime PRN insomnia

## 2025-04-17 NOTE — BH INPATIENT PSYCHIATRY PROGRESS NOTE - NSBHMETABOLIC_PSY_ALL_CORE_FT
BMI: BMI (kg/m2): 19 (04-12-25 @ 17:29)  HbA1c: A1C with Estimated Average Glucose Result: 5.5 % (04-15-25 @ 08:39)    Glucose: POCT Blood Glucose.: 87 mg/dL (04-12-25 @ 03:35)    BP: 132/84 (04-17-25 @ 08:35) (113/77 - 139/86)Vital Signs Last 24 Hrs  T(C): 36.4 (04-17-25 @ 08:35), Max: 36.5 (04-16-25 @ 15:28)  T(F): 97.6 (04-17-25 @ 08:35), Max: 97.7 (04-16-25 @ 15:28)  HR: 96 (04-17-25 @ 08:35) (96 - 108)  BP: 132/84 (04-17-25 @ 08:35) (115/84 - 132/84)  BP(mean): --  RR: --  SpO2: --      Lipid Panel: Date/Time: 04-15-25 @ 08:39  Cholesterol, Serum: 188  LDL Cholesterol Calculated: 95  HDL Cholesterol, Serum: 75  Total Cholesterol/HDL Ration Measurement: --  Triglycerides, Serum: 103

## 2025-04-17 NOTE — BH INPATIENT PSYCHIATRY PROGRESS NOTE - NSBHCHARTREVIEWVS_PSY_A_CORE FT
Vital Signs Last 24 Hrs  T(C): 36.4 (04-17-25 @ 08:35), Max: 36.5 (04-16-25 @ 15:28)  T(F): 97.6 (04-17-25 @ 08:35), Max: 97.7 (04-16-25 @ 15:28)  HR: 96 (04-17-25 @ 08:35) (96 - 108)  BP: 132/84 (04-17-25 @ 08:35) (115/84 - 132/84)  BP(mean): --  RR: --  SpO2: --

## 2025-04-17 NOTE — BH INPATIENT PSYCHIATRY PROGRESS NOTE - CURRENT MEDICATION
MEDICATIONS  (STANDING):  escitalopram 5 milliGRAM(s) Oral daily  famotidine    Tablet 20 milliGRAM(s) Oral daily  losartan 100 milliGRAM(s) Oral daily    MEDICATIONS  (PRN):  acetaminophen     Tablet .. 650 milliGRAM(s) Oral every 6 hours PRN Mild Pain (1 - 3), Moderate Pain (4 - 6)  albuterol    90 MICROgram(s) HFA Inhaler 1 Puff(s) Inhalation every 4 hours PRN cough, sob  diazepam    Tablet 5 milliGRAM(s) Oral two times a day PRN Anxiety  haloperidol     Tablet 2 milliGRAM(s) Oral every 6 hours PRN agitation  oxyCODONE    IR 10 milliGRAM(s) Oral every 8 hours PRN Severe Pain (7 - 10)  traZODone 50 milliGRAM(s) Oral at bedtime PRN insomnia

## 2025-04-17 NOTE — BH INPATIENT PSYCHIATRY PROGRESS NOTE - NSBHASSESSSUMMFT_PSY_ALL_CORE
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 Of note, son and father discovered that pill bottles which were recently filled were almost 2/3s empty and strongly believes this was a suicide attempt.    Patient seen and evaluated 4/17/25. Presents as evasive. Patient states that she is "trying to keep a positive attitude" and she "is a happy person". Continues to appears to be minimizing her symptoms and the fact that she overdosed. When asked about medication quantity that is missing, patient states that her pharmacist owed her medication and only filled half the prescription and that is why half the medication is missing from the bottle. Patient adamantly states that she did not have a suicide attempt. Started on Lexapro yesterday. Will continue to monitor and titrate accordingly. Decreased valium yesterday. Will continue to monitor. Denies suicidal/homicidal ideations. Denies depression. Will continue to monitor. Discussed possible adjustments to patients current medication.       #Mood Disorder  -Valium 10mg BID PRN. Decrease to 5mg BID    -Trazodone 50mg bedtime PRN for insomnia    -Lexapro 5mg QD     -Haldol 2mg Q6 PRN for agitation    -Tylenol PRN for pain    #Home meds  -Oxycodone PRN  -Albuterol PRN  -Losartan    #Opioid abuse  -Addiction  -CATCH team    #Agitation  -for agitation not amenable to verbal redirection, may give haldol 2 mg q6h prn, ativan 1 mg q6h prn, benadryl 25 mg q6h prn with escalation to IM if pt is a danger to self or/and others with repeat EKG to ensure QTc <500 ms.    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 Of note, son and father discovered that pill bottles which were recently filled were almost 2/3s empty and strongly believes this was a suicide attempt.    Patient seen and evaluated 4/17/25. Patient continues to present as evasive. Continues to appears to be minimizing her symptoms and the fact that she overdosed. Patient adamantly states that she did not have a suicide attempt. Started on Lexapro yesterday. Decreased valium yesterday.  Denies suicidal/homicidal ideations. Denies depression. Focused on discharge. Team explained patient does not appear to be ready for discharge yet. Will continue to monitor.       #Mood Disorder  -Valium 10mg BID PRN. Decrease to 5mg BID    -Trazodone 50mg bedtime PRN for insomnia    -Lexapro 5mg QD     -Haldol 2mg Q6 PRN for agitation    -Tylenol PRN for pain    #Home meds  -Oxycodone PRN  -Albuterol PRN  -Losartan    #Opioid abuse  -Addiction  -CATCH team    #Agitation  -for agitation not amenable to verbal redirection, may give haldol 2 mg q6h prn, ativan 1 mg q6h prn, benadryl 25 mg q6h prn with escalation to IM if pt is a danger to self or/and others with repeat EKG to ensure QTc <500 ms.    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 Of note, son and father discovered that pill bottles which were recently filled were almost 2/3s empty and strongly believes this was a suicide attempt.    Patient seen and evaluated 4/17/25. Patient continues to present as evasive. Continues to appears to be minimizing her symptoms and the fact that she overdosed. Patient adamantly states that she did not have a suicide attempt. Started on Lexapro yesterday. Decreased valium yesterday.  Denies suicidal/homicidal ideations. Denies depression. Focused on discharge. Team explained patient does not appear to be ready for discharge yet. Will continue to monitor.       #Mood Disorder  -Valium 5mg BID PRN    -Trazodone 50mg bedtime PRN for insomnia    -Lexapro 5mg QD     -Haldol 2mg Q6 PRN for agitation    -Tylenol PRN for pain    #Home meds  -Oxycodone PRN  -Albuterol PRN  -Losartan    #Opioid abuse  -Addiction  -CATCH team    #Agitation  -for agitation not amenable to verbal redirection, may give haldol 2 mg q6h prn, ativan 1 mg q6h prn, benadryl 25 mg q6h prn with escalation to IM if pt is a danger to self or/and others with repeat EKG to ensure QTc <500 ms.    -PLEASE AVOID BENZOS d/t potential/hx of miss use of pain meds

## 2025-04-17 NOTE — BH INPATIENT PSYCHIATRY PROGRESS NOTE - NSBHFUPINTERVALHXFT_PSY_A_CORE
Patient seen and evaluated 4/17/25. On approach patient calm and semi cooperative, pleasant. No agitation or aggression noted. In good behavorial control. Presents as evasive. Patient states that she is "trying to keep a positive attitude" and she "is a happy person". Continues to state she has chronic insomnia, did not sleep for 2 days, took an extra Ambien and has anxiety that she takes valium for. Continues to appears to be minimizing her symptoms and the fact that she overdosed. When asked about medication quantity that is missing, patient states that her pharmacist owed her medication and only filled half the prescription and that is why half the medication is missing from the bottle. Patient adamantly states that she did not have a suicide attempt. Started on Lexapro yesterday. Denies any side effects/adverse reactions. No side effects/adverse reactions noted. Will continue to monitor and titrate accordingly. Patients Valium decreased yesterday. Will continue to monitor. Denies suicidal/homicidal ideations. Denies depression. Will continue to monitor. Discussed possible adjustments to patients current medication. Patient visible on the unit engaging with peers and attending groups.  Patient seen and evaluated 4/17/25. On approach patient calm and semi cooperative, pleasant. No agitation or aggression noted. In good behavorial control. Continues to present as evasive. Patient states that she is "trying to keep a positive attitude" and she "is a happy person". Continues to state she has chronic insomnia, did not sleep for 2 days, took an extra Ambien and has anxiety that she takes valium for. Continues to appears to be minimizing her symptoms and the fact that she overdosed. Patient continues to adamantly state that she did not have a suicide attempt. Focused on discharge prior to the weekend. Team explained that patient does not appear to ready for discharge yet. Started on Lexapro yesterday. Denies any side effects/adverse reactions. No side effects/adverse reactions noted. Will continue to monitor and titrate accordingly. Patients Valium decreased yesterday. Will continue to monitor and taper. Denies suicidal/homicidal ideations. Denies depression. Will continue to monitor. Patient visible on the unit engaging with peers and attending groups.

## 2025-04-18 PROCEDURE — 99231 SBSQ HOSP IP/OBS SF/LOW 25: CPT

## 2025-04-18 RX ORDER — NICOTINE POLACRILEX 4 MG/1
1 GUM, CHEWING ORAL DAILY
Refills: 0 | Status: DISCONTINUED | OUTPATIENT
Start: 2025-04-18 | End: 2025-04-24

## 2025-04-18 RX ADMIN — Medication 50 MILLIGRAM(S): at 23:18

## 2025-04-18 RX ADMIN — OXYCODONE HYDROCHLORIDE 10 MILLIGRAM(S): 30 TABLET ORAL at 15:57

## 2025-04-18 RX ADMIN — OXYCODONE HYDROCHLORIDE 10 MILLIGRAM(S): 30 TABLET ORAL at 05:36

## 2025-04-18 RX ADMIN — NICOTINE POLACRILEX 1 PATCH: 4 GUM, CHEWING ORAL at 15:23

## 2025-04-18 RX ADMIN — OXYCODONE HYDROCHLORIDE 10 MILLIGRAM(S): 30 TABLET ORAL at 05:37

## 2025-04-18 RX ADMIN — OXYCODONE HYDROCHLORIDE 10 MILLIGRAM(S): 30 TABLET ORAL at 15:23

## 2025-04-18 RX ADMIN — Medication 20 MILLIGRAM(S): at 09:21

## 2025-04-18 RX ADMIN — NICOTINE POLACRILEX 1 PATCH: 4 GUM, CHEWING ORAL at 20:00

## 2025-04-18 RX ADMIN — LOSARTAN POTASSIUM 100 MILLIGRAM(S): 100 TABLET, FILM COATED ORAL at 09:21

## 2025-04-18 RX ADMIN — ESCITALOPRAM OXALATE 5 MILLIGRAM(S): 20 TABLET ORAL at 09:21

## 2025-04-18 NOTE — BH INPATIENT PSYCHIATRY PROGRESS NOTE - CURRENT MEDICATION
MEDICATIONS  (STANDING):  escitalopram 5 milliGRAM(s) Oral daily  famotidine    Tablet 20 milliGRAM(s) Oral daily  losartan 100 milliGRAM(s) Oral daily    MEDICATIONS  (PRN):  acetaminophen     Tablet .. 650 milliGRAM(s) Oral every 6 hours PRN Mild Pain (1 - 3), Moderate Pain (4 - 6)  albuterol    90 MICROgram(s) HFA Inhaler 1 Puff(s) Inhalation every 4 hours PRN cough, sob  diazepam    Tablet 5 milliGRAM(s) Oral two times a day PRN Anxiety  haloperidol     Tablet 2 milliGRAM(s) Oral every 6 hours PRN agitation  oxyCODONE    IR 10 milliGRAM(s) Oral every 8 hours PRN Severe Pain (7 - 10)  traZODone 50 milliGRAM(s) Oral at bedtime PRN insomnia   MEDICATIONS  (STANDING):  escitalopram 5 milliGRAM(s) Oral daily  famotidine    Tablet 20 milliGRAM(s) Oral daily  losartan 100 milliGRAM(s) Oral daily  nicotine -  14 mG/24Hr(s) Patch 1 Patch Transdermal daily    MEDICATIONS  (PRN):  acetaminophen     Tablet .. 650 milliGRAM(s) Oral every 6 hours PRN Mild Pain (1 - 3), Moderate Pain (4 - 6)  albuterol    90 MICROgram(s) HFA Inhaler 1 Puff(s) Inhalation every 4 hours PRN cough, sob  diazepam    Tablet 5 milliGRAM(s) Oral two times a day PRN Anxiety  haloperidol     Tablet 2 milliGRAM(s) Oral every 6 hours PRN agitation  oxyCODONE    IR 10 milliGRAM(s) Oral every 8 hours PRN Severe Pain (7 - 10)  traZODone 50 milliGRAM(s) Oral at bedtime PRN insomnia

## 2025-04-18 NOTE — BH INPATIENT PSYCHIATRY PROGRESS NOTE - NSBHMETABOLIC_PSY_ALL_CORE_FT
BMI: BMI (kg/m2): 19 (04-12-25 @ 17:29)  HbA1c: A1C with Estimated Average Glucose Result: 5.5 % (04-15-25 @ 08:39)    Glucose: POCT Blood Glucose.: 87 mg/dL (04-12-25 @ 03:35)    BP: 121/80 (04-18-25 @ 07:44) (113/77 - 135/95)Vital Signs Last 24 Hrs  T(C): 36.7 (04-18-25 @ 07:44), Max: 36.9 (04-17-25 @ 15:38)  T(F): 98 (04-18-25 @ 07:44), Max: 98.4 (04-17-25 @ 15:38)  HR: 88 (04-18-25 @ 07:44) (88 - 96)  BP: 121/80 (04-18-25 @ 07:44) (121/80 - 135/75)  BP(mean): --  RR: --  SpO2: --      Lipid Panel: Date/Time: 04-15-25 @ 08:39  Cholesterol, Serum: 188  LDL Cholesterol Calculated: 95  HDL Cholesterol, Serum: 75  Total Cholesterol/HDL Ration Measurement: --  Triglycerides, Serum: 103

## 2025-04-18 NOTE — BH INPATIENT PSYCHIATRY PROGRESS NOTE - NSBHFUPINTERVALHXFT_PSY_A_CORE
Patient seen and evaluated 4/18/25. On approach patient calm and semi cooperative, pleasant. No agitation or aggression noted. In good behavorial control. Continues to present as evasive. Patient states that she is "trying to keep a positive attitude" and she "is a happy person". Continues to state she has chronic insomnia, did not sleep for 2 days, took an extra Ambien and has anxiety that she takes valium for. Continues to appears to be minimizing her symptoms and the fact that she overdosed. Patient continues to adamantly state that she did not have a suicide attempt. Focused on discharge prior to the weekend. Team explained that patient does not appear to ready for discharge yet. Recently started on Lexapro. Denies any side effects/adverse reactions. No side effects/adverse reactions noted. Will continue to monitor and titrate accordingly. Valium recently decreased. Patient states she is tolerating decrease well. Will continue to monitor and taper. Discussed with patient tapering off Valium prior to discharge, patient agreeable. Denies suicidal/homicidal ideations. Denies depression. Will continue to monitor. Patient visible on the unit engaging with peers and attending groups.  Patient seen and evaluated 4/18/25. On approach patient calm and semi cooperative, pleasant. No agitation or aggression noted. In good behavorial control. Continues to present as evasive. Continues to appear to be minimizing her symptoms and the fact that she overdosed. Patient continues to adamantly state that she did not have a suicide attempt. Team again explained that patient does not appear to ready for discharge yet. Patient verbalized understanding. Recently started on Lexapro. Will continue to monitor and titrate accordingly. Valium recently decreased. Patient states she is tolerating decrease well. Will continue to monitor and taper. Denies suicidal/homicidal ideations. Denies depression. Will continue to monitor. Patient visible on the unit engaging with peers and attending groups.

## 2025-04-18 NOTE — BH INPATIENT PSYCHIATRY PROGRESS NOTE - NSBHASSESSSUMMFT_PSY_ALL_CORE
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 Of note, son and father discovered that pill bottles which were recently filled were almost 2/3s empty and strongly believes this was a suicide attempt.    Patient seen and evaluated 4/17/25. Patient continues to present as evasive. Continues to appears to be minimizing her symptoms and the fact that she overdosed. Patient adamantly states that she did not have a suicide attempt. Recently started on Lexapro. Valium recently decreased. Discussed with patient tapering off Valium prior to discharge, patient agreeable. Denies suicidal/homicidal ideations. Denies depression. Focused on discharge. Team explained patient does not appear to be ready for discharge yet. Will continue to monitor.       #Mood Disorder  -Valium 5mg BID PRN    -Trazodone 50mg bedtime PRN for insomnia    -Lexapro 5mg QD     -Haldol 2mg Q6 PRN for agitation    -Tylenol PRN for pain    #Home meds  -Oxycodone PRN  -Albuterol PRN  -Losartan    #Opioid abuse  -Addiction  -CATCH team    #Agitation  -for agitation not amenable to verbal redirection, may give haldol 2 mg q6h prn, ativan 1 mg q6h prn, benadryl 25 mg q6h prn with escalation to IM if pt is a danger to self or/and others with repeat EKG to ensure QTc <500 ms.    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 Of note, son and father discovered that pill bottles which were recently filled were almost 2/3s empty and strongly believes this was a suicide attempt.    Patient seen and evaluated 4/17/25. Patient continues to present as evasive. Continues to appears to be minimizing her symptoms and the fact that she overdosed. Patient adamantly states that she did not have a suicide attempt. Recently started on Lexapro. Valium recently decreased. Denies suicidal/homicidal ideations. Denies depression. Focused on discharge. Team explained patient does not appear to be ready for discharge yet. Tapering off Valium. Will continue to monitor.       #Mood Disorder  -Valium 5mg BID PRN    -Trazodone 50mg bedtime PRN for insomnia    -Lexapro 5mg QD     -Haldol 2mg Q6 PRN for agitation    -Tylenol PRN for pain    #Home meds  -Oxycodone PRN  -Albuterol PRN  -Losartan    #Opioid abuse  -Addiction  -CATCH team    #Agitation  -for agitation not amenable to verbal redirection, may give haldol 2 mg q6h prn, ativan 1 mg q6h prn, benadryl 25 mg q6h prn with escalation to IM if pt is a danger to self or/and others with repeat EKG to ensure QTc <500 ms.    -PLEASE AVOID BENZOS d/t potential/hx of miss use of pain meds

## 2025-04-19 RX ORDER — NICOTINE POLACRILEX 4 MG/1
2 GUM, CHEWING ORAL
Refills: 0 | Status: DISCONTINUED | OUTPATIENT
Start: 2025-04-19 | End: 2025-04-24

## 2025-04-19 RX ADMIN — Medication 20 MILLIGRAM(S): at 20:24

## 2025-04-19 RX ADMIN — OXYCODONE HYDROCHLORIDE 10 MILLIGRAM(S): 30 TABLET ORAL at 17:00

## 2025-04-19 RX ADMIN — OXYCODONE HYDROCHLORIDE 10 MILLIGRAM(S): 30 TABLET ORAL at 08:31

## 2025-04-19 RX ADMIN — Medication 20 MILLIGRAM(S): at 08:28

## 2025-04-19 RX ADMIN — LOSARTAN POTASSIUM 100 MILLIGRAM(S): 100 TABLET, FILM COATED ORAL at 08:28

## 2025-04-19 RX ADMIN — OXYCODONE HYDROCHLORIDE 10 MILLIGRAM(S): 30 TABLET ORAL at 17:30

## 2025-04-19 RX ADMIN — Medication 650 MILLIGRAM(S): at 21:34

## 2025-04-19 RX ADMIN — NICOTINE POLACRILEX 1 PATCH: 4 GUM, CHEWING ORAL at 08:15

## 2025-04-19 RX ADMIN — NICOTINE POLACRILEX 1 PATCH: 4 GUM, CHEWING ORAL at 20:00

## 2025-04-19 RX ADMIN — Medication 650 MILLIGRAM(S): at 22:10

## 2025-04-19 RX ADMIN — OXYCODONE HYDROCHLORIDE 10 MILLIGRAM(S): 30 TABLET ORAL at 00:43

## 2025-04-19 RX ADMIN — OXYCODONE HYDROCHLORIDE 10 MILLIGRAM(S): 30 TABLET ORAL at 00:10

## 2025-04-19 RX ADMIN — OXYCODONE HYDROCHLORIDE 10 MILLIGRAM(S): 30 TABLET ORAL at 09:00

## 2025-04-19 RX ADMIN — NICOTINE POLACRILEX 1 PATCH: 4 GUM, CHEWING ORAL at 08:28

## 2025-04-19 RX ADMIN — ESCITALOPRAM OXALATE 5 MILLIGRAM(S): 20 TABLET ORAL at 08:28

## 2025-04-19 RX ADMIN — Medication 50 MILLIGRAM(S): at 23:00

## 2025-04-19 RX ADMIN — NICOTINE POLACRILEX 2 MILLIGRAM(S): 4 GUM, CHEWING ORAL at 15:39

## 2025-04-20 RX ADMIN — OXYCODONE HYDROCHLORIDE 10 MILLIGRAM(S): 30 TABLET ORAL at 10:30

## 2025-04-20 RX ADMIN — LOSARTAN POTASSIUM 100 MILLIGRAM(S): 100 TABLET, FILM COATED ORAL at 08:19

## 2025-04-20 RX ADMIN — Medication 20 MILLIGRAM(S): at 08:19

## 2025-04-20 RX ADMIN — Medication 650 MILLIGRAM(S): at 14:58

## 2025-04-20 RX ADMIN — NICOTINE POLACRILEX 1 PATCH: 4 GUM, CHEWING ORAL at 08:18

## 2025-04-20 RX ADMIN — Medication 50 MILLIGRAM(S): at 23:10

## 2025-04-20 RX ADMIN — NICOTINE POLACRILEX 1 PATCH: 4 GUM, CHEWING ORAL at 07:29

## 2025-04-20 RX ADMIN — OXYCODONE HYDROCHLORIDE 10 MILLIGRAM(S): 30 TABLET ORAL at 02:00

## 2025-04-20 RX ADMIN — OXYCODONE HYDROCHLORIDE 10 MILLIGRAM(S): 30 TABLET ORAL at 17:55

## 2025-04-20 RX ADMIN — NICOTINE POLACRILEX 1 PATCH: 4 GUM, CHEWING ORAL at 20:00

## 2025-04-20 RX ADMIN — Medication 650 MILLIGRAM(S): at 15:30

## 2025-04-20 RX ADMIN — OXYCODONE HYDROCHLORIDE 10 MILLIGRAM(S): 30 TABLET ORAL at 09:55

## 2025-04-20 RX ADMIN — ESCITALOPRAM OXALATE 5 MILLIGRAM(S): 20 TABLET ORAL at 08:19

## 2025-04-20 RX ADMIN — Medication 20 MILLIGRAM(S): at 20:06

## 2025-04-20 RX ADMIN — OXYCODONE HYDROCHLORIDE 10 MILLIGRAM(S): 30 TABLET ORAL at 01:21

## 2025-04-21 PROCEDURE — 99231 SBSQ HOSP IP/OBS SF/LOW 25: CPT

## 2025-04-21 RX ORDER — DIAZEPAM 2 MG/1
2 TABLET ORAL
Refills: 0 | Status: DISCONTINUED | OUTPATIENT
Start: 2025-04-21 | End: 2025-04-24

## 2025-04-21 RX ORDER — DIAZEPAM 2 MG/1
2.5 TABLET ORAL
Refills: 0 | Status: DISCONTINUED | OUTPATIENT
Start: 2025-04-21 | End: 2025-04-21

## 2025-04-21 RX ORDER — OXYCODONE HYDROCHLORIDE 30 MG/1
10 TABLET ORAL EVERY 8 HOURS
Refills: 0 | Status: DISCONTINUED | OUTPATIENT
Start: 2025-04-24 | End: 2025-04-24

## 2025-04-21 RX ADMIN — OXYCODONE HYDROCHLORIDE 10 MILLIGRAM(S): 30 TABLET ORAL at 15:11

## 2025-04-21 RX ADMIN — OXYCODONE HYDROCHLORIDE 10 MILLIGRAM(S): 30 TABLET ORAL at 14:41

## 2025-04-21 RX ADMIN — NICOTINE POLACRILEX 1 PATCH: 4 GUM, CHEWING ORAL at 08:33

## 2025-04-21 RX ADMIN — Medication 20 MILLIGRAM(S): at 08:33

## 2025-04-21 RX ADMIN — OXYCODONE HYDROCHLORIDE 10 MILLIGRAM(S): 30 TABLET ORAL at 02:22

## 2025-04-21 RX ADMIN — OXYCODONE HYDROCHLORIDE 10 MILLIGRAM(S): 30 TABLET ORAL at 03:00

## 2025-04-21 RX ADMIN — OXYCODONE HYDROCHLORIDE 10 MILLIGRAM(S): 30 TABLET ORAL at 22:38

## 2025-04-21 RX ADMIN — Medication 20 MILLIGRAM(S): at 20:31

## 2025-04-21 RX ADMIN — LOSARTAN POTASSIUM 100 MILLIGRAM(S): 100 TABLET, FILM COATED ORAL at 08:33

## 2025-04-21 RX ADMIN — ESCITALOPRAM OXALATE 5 MILLIGRAM(S): 20 TABLET ORAL at 08:33

## 2025-04-21 RX ADMIN — OXYCODONE HYDROCHLORIDE 10 MILLIGRAM(S): 30 TABLET ORAL at 23:08

## 2025-04-21 RX ADMIN — Medication 50 MILLIGRAM(S): at 22:58

## 2025-04-21 NOTE — BH INPATIENT PSYCHIATRY PROGRESS NOTE - NSBHPSYCHOLCOGABN_PSY_A_CORE
disoriented to situation

## 2025-04-21 NOTE — BH TREATMENT PLAN - NSTXPLANTHERAPYSESSIONSFT_PSY_ALL_CORE
04-14-25  Type of therapy: Coping skills, Creative arts therapy, Inspiration and motiviation, Leisure development, Self esteem, Social skills training, Stress management, Symptom management  Type of session: Individual  Level of patient participation: Resistance to participation  Duration of participation: 15 minutes  Therapy conducted by: Psych rehab  Therapy Summary: Pt is visible on the unit, watching TV and associating with nearby peers. Pt will need increased encouragement to attend RT sessions  
  04-16-25  Type of therapy: Other  Type of session: Group  Level of patient participation: Engaged  Duration of participation: 30 minutes  Therapy conducted by: Nursing  --    04-18-25  Type of therapy: Other, Positive thinking  Type of session: Group  Level of patient participation: Attentive, Engaged  Duration of participation: 30 minutes  Therapy conducted by: Nursing  --    04-18-25  Type of therapy: Dialectical behavior therapy, Mindfulness  Type of session: Group  Level of patient participation: Engaged, Participates  Duration of participation: 45 minutes  Therapy conducted by: Social work  Therapy Summary: Patient attended the DBT group with , psychiatry resident and peers. Grounding skills were reviewed such as the 5-4-3-2-1 method, back of the body scan and calming touch techniques. Mental techniques were reviewed to help "engage your mind" such as counting backwards from 100 by 7 and naming objects in alphabetical order. Psychiatry resident and  facilitated the group, while patients were encouraged to participate and provide feedback.     Alix was an active participant and participated in the group activity. She remained present for the duration of the group.    04-20-25  Type of therapy: Coping skills, Creative arts therapy, Inspiration and motiviation, Leisure development, Self esteem, Social skills training, Stress management, Symptom management  Type of session: Group  Level of patient participation: Engaged, Participates  Duration of participation: 45 minutes  Therapy conducted by: Psych rehab  Therapy Summary: Pt is attending RT sessions, mood is improving. Pt is socially interactive and is encouraging with peers. She is well-focused and engaged in creative arts. Drawing and painting are her hobbies at home as well.

## 2025-04-21 NOTE — BH TREATMENT PLAN - NSTXSUICIDINTERPR_PSY_ALL_CORE
ID will offer support and provide encouragement to attend RT sessions to develop coping skills and explore leisure interests while on the unit
ID will continue to offer support and provide encouragement to attend RT sessions

## 2025-04-21 NOTE — BH DISCHARGE NOTE NURSING/SOCIAL WORK/PSYCH REHAB - NSDCADDINFO1FT_PSY_ALL_CORE
*IN PERSON* Intake with [facility] (PLEASE ARRIVE BY 10AM for paperwork)    ?*YOU MUST ATTEND THE INITIAL INTAKE APPOINTMENT IN ORDER TO SEE YOUR PSYCHIATRIST.  MISSED INTAKE WILL CANCEL PSYCHIATRIST'S APPT AUTOMATICALLY!*

## 2025-04-21 NOTE — BH INPATIENT PSYCHIATRY DISCHARGE NOTE - NSBHASSESSSUMMFT_PSY_ALL_CORE
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 Of note, son and father discovered that pill bottles which were recently filled were almost 2/3s empty and strongly believes this was a suicide attempt.    Patient seen and evaluated 4/23/25. Patient denies suicidal/homicidal ideations. Able to contract for safety. Denies any A/V hallucinations. No evidence of psychosis noted. Anticipated discharge 4/24/25. Complaint with medication. Does not warrant continued inpatient hospitalization. Patient does not present a risk to self or others at this time.    Spoke to patients  Ryan [phone], patients son Kalpesh will be picking her up at 11 AM for discharge.     #Mood Disorder  -Valium 2mg BID PRN    -Trazodone 50mg bedtime PRN for insomnia    -Lexapro 5mg QD     -Haldol 2mg Q6 PRN for agitation    -Tylenol PRN for pain    #Home meds  -Oxycodone PRN  -Albuterol PRN  -Losartan    #Opioid abuse  -Addiction  -CATCH team    #Agitation  -for agitation not amenable to verbal redirection, may give haldol 2 mg q6h prn, ativan 1 mg q6h prn, benadryl 25 mg q6h prn with escalation to IM if pt is a danger to self or/and others with repeat EKG to ensure QTc <500 ms.    -PLEASE AVOID BENZOS d/t potential/hx of miss use of pain meds

## 2025-04-21 NOTE — BH INPATIENT PSYCHIATRY PROGRESS NOTE - NSBHASSESSSUMMFT_PSY_ALL_CORE
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 Of note, son and father discovered that pill bottles which were recently filled were almost 2/3s empty and strongly believes this was a suicide attempt.    Patient seen and evaluated 4/21/25. Patient continues to present as evasive. Continues to appears to be minimizing her symptoms and the fact that she overdosed. Patient adamantly states that she did not have a suicide attempt. Recently started on Lexapro. Valium recently decreased. Denies suicidal/homicidal ideations. Denies depression. Focused on discharge. Team explained patient does not appear to be ready for discharge yet. Tapering off Valium. Will continue to monitor.       #Mood Disorder  -Valium 2.5 mg BID PRN    -Trazodone 50mg bedtime PRN for insomnia    -Lexapro 5mg QD     -Haldol 2mg Q6 PRN for agitation    -Tylenol PRN for pain    #Home meds  -Oxycodone PRN  -Albuterol PRN  -Losartan    #Opioid abuse  -Addiction  -CATCH team    #Agitation  -for agitation not amenable to verbal redirection, may give haldol 2 mg q6h prn, ativan 1 mg q6h prn, benadryl 25 mg q6h prn with escalation to IM if pt is a danger to self or/and others with repeat EKG to ensure QTc <500 ms.    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 Of note, son and father discovered that pill bottles which were recently filled were almost 2/3s empty and strongly believes this was a suicide attempt.    Patient seen and evaluated 4/21/25. Patient continues to present as evasive. Continues to appears to be minimizing her symptoms and the fact that she overdosed. Recently started on Lexapro. Valium recently decreased. Denies suicidal/homicidal ideations. Denies depression. Focused on discharge. Team explained patient does not appear to be ready for discharge yet. Tapering off Valium. Will continue to monitor.       #Mood Disorder  -Valium 2.5 mg BID PRN    -Trazodone 50mg bedtime PRN for insomnia    -Lexapro 5mg QD     -Haldol 2mg Q6 PRN for agitation    -Tylenol PRN for pain    #Home meds  -Oxycodone PRN  -Albuterol PRN  -Losartan    #Opioid abuse  -Addiction  -CATCH team    #Agitation  -for agitation not amenable to verbal redirection, may give haldol 2 mg q6h prn, ativan 1 mg q6h prn, benadryl 25 mg q6h prn with escalation to IM if pt is a danger to self or/and others with repeat EKG to ensure QTc <500 ms.    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 Of note, son and father discovered that pill bottles which were recently filled were almost 2/3s empty and strongly believes this was a suicide attempt.    Patient seen and evaluated 4/21/25. Patient continues to present as evasive. Continues to appears to be minimizing her symptoms and the fact that she overdosed. Recently started on Lexapro. Valium recently decreased. Denies suicidal/homicidal ideations. Denies depression. Focused on discharge. Team explained patient does not appear to be ready for discharge yet. Tapering off Valium. Will continue to monitor.       #Mood Disorder  -Valium 2mg BID PRN    -Trazodone 50mg bedtime PRN for insomnia    -Lexapro 5mg QD     -Haldol 2mg Q6 PRN for agitation    -Tylenol PRN for pain    #Home meds  -Oxycodone PRN  -Albuterol PRN  -Losartan    #Opioid abuse  -Addiction  -CATCH team    #Agitation  -for agitation not amenable to verbal redirection, may give haldol 2 mg q6h prn, ativan 1 mg q6h prn, benadryl 25 mg q6h prn with escalation to IM if pt is a danger to self or/and others with repeat EKG to ensure QTc <500 ms.    -PLEASE AVOID BENZOS d/t potential/hx of miss use of pain meds

## 2025-04-21 NOTE — BH INPATIENT PSYCHIATRY DISCHARGE NOTE - HPI (INCLUDE ILLNESS QUALITY, SEVERITY, DURATION, TIMING, CONTEXT, MODIFYING FACTORS, ASSOCIATED SIGNS AND SYMPTOMS)
Chart reviewed , discussed with staff  and patient evaluated by her bed side.  she is pleasant and  cooperative.   she reports  that she did not sleep  for 3 days  and  finally  was in deep sleep and fell off  from her bed. She reports  that her son mistakenly called 911 and he thought " I did  overdose with pills"  She reports that she is Druze and  would never think of commit ing suicide. she reports that she has had 2 hip replacement  surgeries and is on oxycodone  for many years  , prescribed by pain management  and clonazepam prescribed by her psychiatrist . she seems minimizing her overdose with pills.  she reports that keeps empty bottles in her  draw.  she denies feeling   depress. denies s/h ideations intent or plan. denies a/VH. denies manic symptoms.  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 Of note, son and father discovered that pill bottles which were recently filled were almost 2/3s empty and strongly believes this was a suicide attempt.    Of note, patient somewhat forgetful during interview, not recalling names or past dates very well (for example reported son had passed away 13 years ago, but he was actually in a car accident 13 years ago and passed away 6 years ago, reports death anniversary of son is in May, but death anniversary is actually in April). Patient reports that she only took one pill of oxycodone and then her son told her she is going to the hospital (of note, this is not accurate, patient found breathing but unresponsive, EMS activated, patient given Narcan on field and brought to hospital). She denies taking any more pills than this, denies overdosing, denies ever taking her medications other than how its prescribed. Patient states that the only medication she is on is Benadryl for her allergies. When asked about the oxycodone she reports taking, she states that it was past prescriptions and she took it for pain. She expresses that her son made her come to hospital because "hes very nice and was worried" but that she would like to go home. She reports her mood is good, but states she does get sad when she thinks of her older son who  13 years ago (come back to son's death multiple times during interview). She reports good sleep and energy. She reports appetite is somewhat chronically low due to partial resection of stomach but that she tries to get 2-3 meals a day. She denies anhedonia, reports enjoying watching television. She denies any persistent depressive, anxiety, manic, or psychotic symptoms. She denies have a psychiatrist, states she has a therapist she sees approximately 3 times a month. She denies any recent or current PSI/SI/IP, denies HI/IP, denies AVH. She states that she has a great life and great home and would never harm self.     Collateral obtained from adult son Kalpesh #[phone] by both JUNIOR Rooney (see separate note) and this writer. Per son, patient is not overtly forgetful or confused at baseline, and things her current state is likely due to situation regarding overdose/Narcan. He also reports that patient has a hx of severely minimizing symptoms and situations. He reports that patient has been had worsening depressed mood for the past week, especially since "hearing some bad news regarding her uclers from her doctor, but she didn't tell me the details" and due to brother's death anniversary being this month. He reports that patient has expressed SI, and stated she wished to be with her dead son. He states that he found patient down/unresponsive and that he and his father found her pill bottles almost 2/3 empty and that they were just filled recently. He reports the pill bottles were that of Valium, Oxycodone, and Butalbital (unsure of doses). Reports a similar episode in January, which son believes was also an SA via overdose, but she "minimized symptoms and was able to talk her way to being discharged." He reports that patient also at times misuses prescribed medications and will take more than directed. He reports that patient has a psychiatrist who she sees once a month for the Valium but denies that she has a therapist she sees 3x a month. Reports that patient has chronically been somewhat unsteady on her feet and has hx of falls, though she does not use walker/cane. He strongly believes that this was a SA, does not feel safe with patient being discharged, and is strongly advocating for admission.    Per [facility]: Patient prescribed Diazepam 10mg, 60 tablets for 30 day supply, last dispensed on 3/11/25 and Zolpidem 10mg, 30 tablets for 30 day supply, last dispensed on 3/11/25 by Rosanna Walden. Patient prescribed oxycodone 10mg, 84 tabs for 28 day supply, last dispensed on 3/24/25 and pregabalin 100mg, 56 tablets for a 28 day supply, last dispensed on 3/17/25 by Gerardo Wang.   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 Of note, son and father discovered that pill bottles which were recently filled were almost 2/3s empty and strongly believes this was a suicide attempt.    Patient seen and evaluated 4/23/25. On approach patient calm and cooperative, pleasant. No agitation or aggression noted. In good behavorial control. Patient states she is tolerating valium taper and Lexapro well. Patient has not needed PRN Valium. Denies suicidal/homicidal ideations. Able to contract for safety. Denies any A/V hallucinations. No evidence of psychosis noted. Anticipated discharge tomorrow 4/24/25. Complaint with medication. Does not warrant continued inpatient hospitalization. Patient does not present a risk to self or others at this time. Patient visible on the unit engaging with peers and attending groups.

## 2025-04-21 NOTE — BH DISCHARGE NOTE NURSING/SOCIAL WORK/PSYCH REHAB - NSCDUDCCRISIS_PSY_A_CORE
On license of [facility]  1 [phone] [facility] [phone])  Text "WELL" to [phone]  Website: [email or web address]/.National Suicide Prevention Lifeline 0 [phone] [phone]) 377-2095/988 Suicide and Crisis Lifeline

## 2025-04-21 NOTE — BH INPATIENT PSYCHIATRY DISCHARGE NOTE - NSBHFUPINTERVALHXFT_PSY_A_CORE
Patient seen and evaluated 4/23/25. On approach patient calm and cooperative, pleasant. No agitation or aggression noted. In good behavorial control. Patient states she is tolerating valium taper and Lexapro well. Patient has not needed PRN Valium. Denies suicidal/homicidal ideations. Able to contract for safety. Denies any A/V hallucinations. No evidence of psychosis noted. Anticipated discharge tomorrow 4/24/25. Complaint with medication. Does not warrant continued inpatient hospitalization. Patient does not present a risk to self or others at this time. Patient visible on the unit engaging with peers and attending groups.     Spoke to patients  Ryan [phone], patients son Kalpesh will be picking her up at 11 AM for discharge.

## 2025-04-21 NOTE — BH DISCHARGE NOTE NURSING/SOCIAL WORK/PSYCH REHAB - NSDCSUICIDETHOUGHTS_PSY_ALL_CORE
"Chief Complaint   Patient presents with     Urgent Care     Infection     x 5 days       Initial /60 (BP Location: Left arm, Patient Position: Chair, Cuff Size: Adult Large)  Pulse 66  Temp 97.5  F (36.4  C) (Oral)  Wt 205 lb (93 kg)  SpO2 98%  Breastfeeding? No  BMI 38.11 kg/m2 Estimated body mass index is 38.11 kg/(m^2) as calculated from the following:    Height as of 3/14/18: 5' 1.5\" (1.562 m).    Weight as of this encounter: 205 lb (93 kg).  Medication Reconciliation: complete     Marbella Colindres MA   "
No

## 2025-04-21 NOTE — BH TREATMENT PLAN - NSTXSUBMISINTERMD_PSY_ALL_CORE
Medication management and milieu therapy. Consult placed for the CATCH team to educate the signs of an opioid overdose and provide patients with Narcan.
Medication management and milieu therapy. Consult placed for the CATCH team to educate the signs of an opioid overdose and provide patients with Narcan.

## 2025-04-21 NOTE — BH INPATIENT PSYCHIATRY DISCHARGE NOTE - NSBHMSEPERCEPT_PSY_A_CORE
· Severe confusion on top of known dementia in setting of urinary tract infection and sepsis  · Patient found by [facility] police in his car, contacted his family in [place] who stated he left 2 days prior and was unable to be found  · CT head no acute abnormality  · Improving after fluid resuscitation  · monitor neuro status  · PT/OT/case management for assistance in returning patient to his family - patient will need to be under 24/7 surveillance once he gets back home  · Back to baseline mental status, pleasant, noncombative    [facility]  No abnormalities

## 2025-04-21 NOTE — BH INPATIENT PSYCHIATRY DISCHARGE NOTE - NSDCCAREPROVSEEN_GEN_ALL_CORE_FT
[address]  [address], [address]      History and Physical       NAME:  Mati Mc   :   1951   MRN:   [id number]             History of Present Illness:  Patient is a 65 y.o. who is seen for change in bowel habits. PMH:  History reviewed. No pertinent past medical history. PSH:  Past Surgical History:   Procedure Laterality Date    HX GYN      HX ORTHOPAEDIC      left wrist       Allergies:  No Known Allergies    Home Medications:  Prior to Admission Medications   Prescriptions Last Dose Informant Patient Reported? Taking?   calcium-cholecalciferol, d3, (CALCIUM 600 + D) 600-125 mg-unit tab 2021 at Unknown time  Yes Yes   Sig: Take  by mouth. Facility-Administered Medications: None       Hospital Medications:  Current Facility-Administered Medications   Medication Dose Route Frequency    0.9% sodium chloride infusion  50 mL/hr IntraVENous CONTINUOUS    sodium chloride (NS) flush 5-40 mL  5-40 mL IntraVENous Q8H    sodium chloride (NS) flush 5-40 mL  5-40 mL IntraVENous PRN    midazolam (VERSED) injection 0.25-5 mg  0.25-5 mg IntraVENous Multiple    fentaNYL citrate (PF) injection  mcg   mcg IntraVENous Multiple    naloxone (NARCAN) injection 0.4 mg  0.4 mg IntraVENous Multiple    flumazeniL (ROMAZICON) 0.1 mg/mL injection 0.2 mg  0.2 mg IntraVENous Multiple    simethicone (MYLICON) 46WV/8.5MX oral drops 80 mg  1.2 mL Oral Multiple    atropine injection 0.5 mg  0.5 mg IntraVENous ONCE PRN    EPINEPHrine (ADRENALIN) 0.1 mg/mL syringe 1 mg  1 mg Endoscopically ONCE PRN     Facility-Administered Medications Ordered in Other Encounters   Medication Dose Route Frequency    0.9% sodium chloride infusion   IntraVENous CONTINUOUS       Social History:  Social History     Tobacco Use    Smoking status: Never Smoker    Smokeless tobacco: Never Used   Substance Use Topics    Alcohol use:  Yes     Alcohol/week: 1.0 standard drink Types: 1 Glasses of wine per week     Comment: 1 one glass per week       Family History:  History reviewed. No pertinent family history. Review of Systems:      Constitutional: negative fever, negative chills, negative weight loss  Eyes:   negative visual changes  ENT:   negative sore throat, tongue or lip swelling  Respiratory:  negative cough, negative dyspnea  Cards:  negative for chest pain, palpitations, lower extremity edema  GI:   See HPI  :  negative for frequency, dysuria  Integument:  negative for rash and pruritus  Heme:  negative for easy bruising and gum/nose bleeding  Musculoskel: negative for myalgias,  back pain and muscle weakness  Neuro: negative for headaches, dizziness, vertigo  Psych:  negative for feelings of anxiety, depression       Objective:     Patient Vitals for the past 8 hrs:   BP Temp Pulse Resp SpO2 Height Weight   11/16/21 1358 (!) 154/99 97.4 °F (36.3 °C) 87 17 99 % 5' 7\" (1.702 m) 48.5 kg (107 lb)     No intake/output data recorded. No intake/output data recorded. EXAM:     NEURO-a&o   HEENT-wnl   LUNGS-clear    COR-regular rate and rhythym     ABD-soft , no tenderness, no rebound, good bs     EXT-no edema     Data Review     No results for input(s): WBC, HGB, HCT, PLT, HGBEXT, HCTEXT, PLTEXT in the last 72 hours. No results for input(s): NA, K, CL, CO2, BUN, CREA, GLU, PHOS, CA in the last 72 hours. No results for input(s): AP, TBIL, TP, ALB, GLOB, GGT, AML, LPSE in the last 72 hours. No lab exists for component: SGOT, GPT, AMYP, HLPSE  No results for input(s): INR, PTP, APTT, INREXT in the last 72 hours. Assessment:     · Change in bowel habits   There is no problem list on file for this patient. Plan:   · The patient was counseled at length about the risks of yovany Covid-19 in the brigitte-operative and post-operative states including the recovery window of their procedure.   The patient was made aware that yovany Covid-19 after a surgical procedure may worsen their prognosis for recovering from the virus and lend to a higher morbidity and or mortality risk. The patient was given the options of postponing their procedure. All of the risks, benefits, and alternatives were discussed. The patient does wish to proceed with the procedure. · Endoscopic procedure with MAC     Signed By: Margo Matias.  Archie Lam MD     11/16/2021  2:23 PM Marshall, Josefina Phipps

## 2025-04-21 NOTE — BH INPATIENT PSYCHIATRY DISCHARGE NOTE - TOBACCO USAGE Y/N (NON CORE MEASURE SITES)
Pt calling in again wanting to know if she DEFINITELY had COVID or not - says she has received two sets of antibody results in her chart and she's very upset and unsure what is the truth  If you see the reports scanned into her chart it does show one showing "Reactive" and one showing "Non-reactive" (see page 1 and page 2) and she wants your medical opinion on if she really did have COVID or if she didn't and why would one show reactive and one show non-reactive?  Please advise, ty Yes

## 2025-04-21 NOTE — BH INPATIENT PSYCHIATRY DISCHARGE NOTE - NSDCMRMEDTOKEN_GEN_ALL_CORE_FT
acetaminophen 325 mg oral tablet: 2 tab(s) orally every 6 hours MDD:8  Ambien 10 mg oral tablet: 1 tab(s) orally once a day (at bedtime) MDD: 1 tablet  aspirin 81 mg oral delayed release tablet: 1 tab(s) orally 2 times a day MDD:2  atorvastatin 40 mg oral tablet: 1 tab(s) orally once a day (at bedtime)  diazePAM 10 mg oral tablet: 1 tab(s) orally 2 times a day MDD: 2 tablets  furosemide 20 mg oral tablet: 1 tab(s) orally once a day  Lyrica 150 mg oral capsule: 1 cap(s) orally 2 times a day MDD: 2 tablets  Narcan 4 mg/0.1 mL nasal spray: 1 spray(s) intranasally once PRN for opiate overdose  oxyCODONE 10 mg oral tablet: 1 tab(s) orally 3 times a day as needed for  severe pain MDD: 3 tablets  Zantac 150 oral tablet: 1 tab(s) orally 2 times a day   albuterol 90 mcg/inh inhalation aerosol: 1 puff(s) inhaled every 4 hours as needed for cough, sob Continue until told otherwise by your provider.  escitalopram 5 mg oral tablet: 1 tab(s) orally once a day x 14 days Continue until told otherwise by your provider.  famotidine 20 mg oral tablet: 1 tab(s) orally 2 times a day x 14 days Continue until told otherwise by your provider.  losartan 100 mg oral tablet: 1 tab(s) orally once a day Continue until told otherwise by your provider.  nicotine 2 mg oral transmucosal gum: 1 gum orally every 6 hours as needed for Smoking Cessation  oxyCODONE 10 mg oral tablet: 1 tab(s) orally every 8 hours as needed for Severe Pain (7 - 10) Continue until told otherwise by your provider.

## 2025-04-21 NOTE — BH INPATIENT PSYCHIATRY PROGRESS NOTE - NSBHMETABOLIC_PSY_ALL_CORE_FT
BMI: BMI (kg/m2): 19 (04-12-25 @ 17:29)  HbA1c: A1C with Estimated Average Glucose Result: 5.5 % (04-15-25 @ 08:39)    Glucose: POCT Blood Glucose.: 87 mg/dL (04-12-25 @ 03:35)    BP: 126/87 (04-21-25 @ 08:15) (101/70 - 139/92)Vital Signs Last 24 Hrs  T(C): 36.7 (04-21-25 @ 08:15), Max: 36.7 (04-20-25 @ 16:02)  T(F): 98 (04-21-25 @ 08:15), Max: 98.1 (04-20-25 @ 16:02)  HR: 89 (04-21-25 @ 08:15) (89 - 90)  BP: 126/87 (04-21-25 @ 08:15) (103/78 - 126/87)  BP(mean): --  RR: --  SpO2: --      Lipid Panel: Date/Time: 04-15-25 @ 08:39  Cholesterol, Serum: 188  LDL Cholesterol Calculated: 95  HDL Cholesterol, Serum: 75  Total Cholesterol/HDL Ration Measurement: --  Triglycerides, Serum: 103

## 2025-04-21 NOTE — BH INPATIENT PSYCHIATRY PROGRESS NOTE - CURRENT MEDICATION
MEDICATIONS  (STANDING):  escitalopram 5 milliGRAM(s) Oral daily  famotidine    Tablet 20 milliGRAM(s) Oral two times a day  losartan 100 milliGRAM(s) Oral daily  nicotine -  14 mG/24Hr(s) Patch 1 Patch Transdermal daily    MEDICATIONS  (PRN):  acetaminophen     Tablet .. 650 milliGRAM(s) Oral every 6 hours PRN Mild Pain (1 - 3), Moderate Pain (4 - 6)  albuterol    90 MICROgram(s) HFA Inhaler 1 Puff(s) Inhalation every 4 hours PRN cough, sob  diazepam    Tablet 5 milliGRAM(s) Oral two times a day PRN Anxiety  haloperidol     Tablet 2 milliGRAM(s) Oral every 6 hours PRN agitation  nicotine  Polacrilex Gum 2 milliGRAM(s) Oral every 2 hours PRN Smoking Cessation  oxyCODONE    IR 10 milliGRAM(s) Oral every 8 hours PRN Severe Pain (7 - 10)  traZODone 50 milliGRAM(s) Oral at bedtime PRN insomnia   MEDICATIONS  (STANDING):  escitalopram 5 milliGRAM(s) Oral daily  famotidine    Tablet 20 milliGRAM(s) Oral two times a day  losartan 100 milliGRAM(s) Oral daily  nicotine -  14 mG/24Hr(s) Patch 1 Patch Transdermal daily    MEDICATIONS  (PRN):  acetaminophen     Tablet .. 650 milliGRAM(s) Oral every 6 hours PRN Mild Pain (1 - 3), Moderate Pain (4 - 6)  albuterol    90 MICROgram(s) HFA Inhaler 1 Puff(s) Inhalation every 4 hours PRN cough, sob  diazepam    Tablet 2.5 milliGRAM(s) Oral two times a day PRN Anxiety  haloperidol     Tablet 2 milliGRAM(s) Oral every 6 hours PRN agitation  nicotine  Polacrilex Gum 2 milliGRAM(s) Oral every 2 hours PRN Smoking Cessation  oxyCODONE    IR 10 milliGRAM(s) Oral every 8 hours PRN Severe Pain (7 - 10)  traZODone 50 milliGRAM(s) Oral at bedtime PRN insomnia   MEDICATIONS  (STANDING):  escitalopram 5 milliGRAM(s) Oral daily  famotidine    Tablet 20 milliGRAM(s) Oral two times a day  losartan 100 milliGRAM(s) Oral daily  nicotine -  14 mG/24Hr(s) Patch 1 Patch Transdermal daily    MEDICATIONS  (PRN):  acetaminophen     Tablet .. 650 milliGRAM(s) Oral every 6 hours PRN Mild Pain (1 - 3), Moderate Pain (4 - 6)  albuterol    90 MICROgram(s) HFA Inhaler 1 Puff(s) Inhalation every 4 hours PRN cough, sob  diazepam    Tablet 2 milliGRAM(s) Oral two times a day PRN Anxiety  haloperidol     Tablet 2 milliGRAM(s) Oral every 6 hours PRN agitation  nicotine  Polacrilex Gum 2 milliGRAM(s) Oral every 2 hours PRN Smoking Cessation  oxyCODONE    IR 10 milliGRAM(s) Oral every 8 hours PRN Severe Pain (7 - 10)  traZODone 50 milliGRAM(s) Oral at bedtime PRN insomnia

## 2025-04-21 NOTE — BH INPATIENT PSYCHIATRY PROGRESS NOTE - NSBHCHARTREVIEWVS_PSY_A_CORE FT
Vital Signs Last 24 Hrs  T(C): 36.7 (04-21-25 @ 08:15), Max: 36.7 (04-20-25 @ 16:02)  T(F): 98 (04-21-25 @ 08:15), Max: 98.1 (04-20-25 @ 16:02)  HR: 89 (04-21-25 @ 08:15) (89 - 90)  BP: 126/87 (04-21-25 @ 08:15) (103/78 - 126/87)  BP(mean): --  RR: --  SpO2: --

## 2025-04-21 NOTE — BH INPATIENT PSYCHIATRY PROGRESS NOTE - NSBHATTESTBILLING_PSY_A_CORE
27055-Mgbxoqnbex OBS or IP - moderate complexity OR 35-49 mins 44877-Lucnkkjmzh OBS or IP - low complexity OR 25-34 mins

## 2025-04-21 NOTE — BH INPATIENT PSYCHIATRY PROGRESS NOTE - PRN MEDS
MEDICATIONS  (PRN):  acetaminophen     Tablet .. 650 milliGRAM(s) Oral every 6 hours PRN Mild Pain (1 - 3), Moderate Pain (4 - 6)  albuterol    90 MICROgram(s) HFA Inhaler 1 Puff(s) Inhalation every 4 hours PRN cough, sob  diazepam    Tablet 5 milliGRAM(s) Oral two times a day PRN Anxiety  haloperidol     Tablet 2 milliGRAM(s) Oral every 6 hours PRN agitation  nicotine  Polacrilex Gum 2 milliGRAM(s) Oral every 2 hours PRN Smoking Cessation  oxyCODONE    IR 10 milliGRAM(s) Oral every 8 hours PRN Severe Pain (7 - 10)  traZODone 50 milliGRAM(s) Oral at bedtime PRN insomnia   MEDICATIONS  (PRN):  acetaminophen     Tablet .. 650 milliGRAM(s) Oral every 6 hours PRN Mild Pain (1 - 3), Moderate Pain (4 - 6)  albuterol    90 MICROgram(s) HFA Inhaler 1 Puff(s) Inhalation every 4 hours PRN cough, sob  diazepam    Tablet 2.5 milliGRAM(s) Oral two times a day PRN Anxiety  haloperidol     Tablet 2 milliGRAM(s) Oral every 6 hours PRN agitation  nicotine  Polacrilex Gum 2 milliGRAM(s) Oral every 2 hours PRN Smoking Cessation  oxyCODONE    IR 10 milliGRAM(s) Oral every 8 hours PRN Severe Pain (7 - 10)  traZODone 50 milliGRAM(s) Oral at bedtime PRN insomnia   MEDICATIONS  (PRN):  acetaminophen     Tablet .. 650 milliGRAM(s) Oral every 6 hours PRN Mild Pain (1 - 3), Moderate Pain (4 - 6)  albuterol    90 MICROgram(s) HFA Inhaler 1 Puff(s) Inhalation every 4 hours PRN cough, sob  diazepam    Tablet 2 milliGRAM(s) Oral two times a day PRN Anxiety  haloperidol     Tablet 2 milliGRAM(s) Oral every 6 hours PRN agitation  nicotine  Polacrilex Gum 2 milliGRAM(s) Oral every 2 hours PRN Smoking Cessation  oxyCODONE    IR 10 milliGRAM(s) Oral every 8 hours PRN Severe Pain (7 - 10)  traZODone 50 milliGRAM(s) Oral at bedtime PRN insomnia

## 2025-04-21 NOTE — BH DISCHARGE NOTE NURSING/SOCIAL WORK/PSYCH REHAB - FINANCIAL ASSISTANCE
[facility] provides services at a reduced cost to those who are determined to be eligible through [facility]’s financial assistance program. Information regarding [facility]’s financial assistance program can be found by going to [email or web address] or by calling [phone].

## 2025-04-21 NOTE — BH INPATIENT PSYCHIATRY PROGRESS NOTE - NSBHFUPINTERVALHXFT_PSY_A_CORE
Patient seen and evaluated 4/21/25. On approach patient calm and semi cooperative, pleasant. No agitation or aggression noted. In good behavorial control. Continues to present as evasive. Continues to appear to be minimizing her symptoms and the fact that she overdosed. Patient continues to adamantly state that she did not have a suicide attempt. Team again explained that patient does not appear to ready for discharge yet. Patient verbalized understanding. Recently started on Lexapro. Will continue to monitor and titrate accordingly. Valium recently decreased. Patient states she is tolerating decrease well. Patient did not need PRN Valium over weekend. Will continue to monitor and taper. Denies suicidal/homicidal ideations. Denies depression. Will continue to monitor. Patient visible on the unit engaging with peers and attending groups.

## 2025-04-21 NOTE — BH DISCHARGE NOTE NURSING/SOCIAL WORK/PSYCH REHAB - NSDCPEEMAIL_GEN_ALL_CORE
- Patient is pursuing Conservative Program with bundles with the dietician   - Initial weight loss goal of 5-10% weight loss for improved health  - labs reviewed: fasting insulin A1C, and TSH 5/20/2022 and all within normal limits  - She is interested in weight loss medication  Discussed phentermine, Topamax, Wellbutrin, naltrexone, and Metformin    - She will take some time to think about the medications  - Denies history of seizures, glaucoma, or kidney stones  - Currently on paroxetine and could consider changing that to Wellbutrin, but will defer to prescribing provider  Initial: 220 lbs  Current: 211 7 lbs  Change: -8 3 lbs  Goal: 180 lbs    Goals:  Do not skip meals  Food log (ie ) www myfitnesspal com,sparkpeople  com,loseit com,calorieking  com,etc  baritastic (use skinnytaste  com, dietdoctor  com or smartphone chon Kanshu for recipes)  No sugary beverages  At least 64oz of water daily  Increase physical activity by 10 minutes daily  Gradually increase physical activity to a goal of 5 days per week for 30 minutes of MODERATE intensity PLUS 2 days per week of FULL BODY resistance training (use smartphone apps [employer], Home Workout, etc )  - Continue food logging, weighing and measuring    - Keep up the great work with exercise  - Keep up the great water intake  - [id number] calories, flex to 1600 calories on cardio days  [facility] email [email or web address]

## 2025-04-21 NOTE — BH INPATIENT PSYCHIATRY DISCHARGE NOTE - NSDCCPCAREPLAN_GEN_ALL_CORE_FT
PRINCIPAL DISCHARGE DIAGNOSIS  Diagnosis: Mood disorder  Assessment and Plan of Treatment: Patient to continue with prescribed medication and follow up with outpatient      SECONDARY DISCHARGE DIAGNOSES  Diagnosis: Opioid abuse  Assessment and Plan of Treatment: Patient to follow up with outpatient

## 2025-04-21 NOTE — BH DISCHARGE NOTE NURSING/SOCIAL WORK/PSYCH REHAB - PATIENT PORTAL LINK FT
You can access the FollowMyHealth Patient Portal offered by [facility] by registering at the following website: [email or web address]. By joining [facility]’s FollowMyHealth portal, you will also be able to view your health information using other applications (apps) compatible with our system.

## 2025-04-21 NOTE — BH DISCHARGE NOTE NURSING/SOCIAL WORK/PSYCH REHAB - NSDCPEWEB_GEN_ALL_CORE
[facility] website --- [email or web address] website --- [email or web address]

## 2025-04-21 NOTE — BH DISCHARGE NOTE NURSING/SOCIAL WORK/PSYCH REHAB - NSBHDCREFERRAL2_PSY_ALL_CORE
Education Record    Learner:  Patient and Family Member    Disease / Diagnosis: osteomyelitis; daptomycin administration    Barriers / Limitations:  None   Comments:    Method:  Discussion   Comments:    General Topics:  Plan of care reviewed   Comments:    Outcome:  Shows understanding   Comments:
Additional Aftercare Appointment

## 2025-04-21 NOTE — BH INPATIENT PSYCHIATRY DISCHARGE NOTE - REASON FOR ADMISSION
S/P suspected suicide attempt. Patient found down/unresponsive. Family discovered that pill bottles which were recently filled were almost 2/3s empty and strongly believes this was a suicide attempt.

## 2025-04-21 NOTE — BH TREATMENT PLAN - NSDCCRITERIA_PSY_ALL_CORE
her mood will be in control     will not be suicidal.  
her mood will be in control     will not be suicidal.

## 2025-04-21 NOTE — BH INPATIENT PSYCHIATRY DISCHARGE NOTE - HOSPITAL COURSE
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 Of note, son and father discovered that pill bottles which were recently filled were almost 2/3s empty and strongly believes this was a suicide attempt.    Of note, patient somewhat forgetful during interview, not recalling names or past dates very well (for example reported son had passed away 13 years ago, but he was actually in a car accident 13 years ago and passed away 6 years ago, reports death anniversary of son is in May, but death anniversary is actually in April). Patient reports that she only took one pill of oxycodone and then her son told her she is going to the hospital (of note, this is not accurate, patient found breathing but unresponsive, EMS activated, patient given Narcan on field and brought to hospital). She denies taking any more pills than this, denies overdosing, denies ever taking her medications other than how its prescribed. Patient states that the only medication she is on is Benadryl for her allergies. When asked about the oxycodone she reports taking, she states that it was past prescriptions and she took it for pain. She expresses that her son made her come to hospital because "hes very nice and was worried" but that she would like to go home. She reports her mood is good, but states she does get sad when she thinks of her older son who  13 years ago (come back to son's death multiple times during interview). She reports good sleep and energy. She reports appetite is somewhat chronically low due to partial resection of stomach but that she tries to get 2-3 meals a day. She denies anhedonia, reports enjoying watching television. She denies any persistent depressive, anxiety, manic, or psychotic symptoms. She denies have a psychiatrist, states she has a therapist she sees approximately 3 times a month. She denies any recent or current PSI/SI/IP, denies HI/IP, denies AVH. She states that she has a great life and great home and would never harm self.     Collateral obtained from adult son Kalpesh #[phone] by both JUNIOR Rooney (see separate note) and this writer. Per son, patient is not overtly forgetful or confused at baseline, and things her current state is likely due to situation regarding overdose/Narcan. He also reports that patient has a hx of severely minimizing symptoms and situations. He reports that patient has been had worsening depressed mood for the past week, especially since "hearing some bad news regarding her uclers from her doctor, but she didn't tell me the details" and due to brother's death anniversary being this month. He reports that patient has expressed SI, and stated she wished to be with her dead son. He states that he found patient down/unresponsive and that he and his father found her pill bottles almost 2/3 empty and that they were just filled recently. He reports the pill bottles were that of Valium, Oxycodone, and Butalbital (unsure of doses). Reports a similar episode in January, which son believes was also an SA via overdose, but she "minimized symptoms and was able to talk her way to being discharged." He reports that patient also at times misuses prescribed medications and will take more than directed. He reports that patient has a psychiatrist who she sees once a month for the Valium but denies that she has a therapist she sees 3x a month. Reports that patient has chronically been somewhat unsteady on her feet and has hx of falls, though she does not use walker/cane. He strongly believes that this was a SA, does not feel safe with patient being discharged, and is strongly advocating for admission.    Patient seen and evaluated on IPP with Attending psychiatrist Dr. Murguia 25. On approach patient calm and semi cooperative, pleasant. Patient adamantly states that she did not have a suicide attempt. states she has chronic insomnia, did not sleep for 2 days, took and extra Ambien. Made it a point to tell us that she is "Church", her cousin committed suicide and would call 911 on anyone with SI, including herself. Denies suicidal/homicidal ideations. Denies depression. States she has some anxiety but take valium for it. States this is the only time she took more medication than she should have. States she takes her medication as prescribes. Presents as evasive. Appears to be minimizing her symptoms and the fact that she overdosed. States she was just sleeping. Will continue to monitor. At this time team unsure if patient is safe to go home. Attending discussed possible adjustments to patients medication. Addiction and CATCH team consult put in. Denies any ETOH or drug use.   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 Of note, son and father discovered that pill bottles which were recently filled were almost 2/3s empty and strongly believes this was a suicide attempt.    Of note, patient somewhat forgetful during interview, not recalling names or past dates very well (for example reported son had passed away 13 years ago, but he was actually in a car accident 13 years ago and passed away 6 years ago, reports death anniversary of son is in May, but death anniversary is actually in April). Patient reports that she only took one pill of oxycodone and then her son told her she is going to the hospital (of note, this is not accurate, patient found breathing but unresponsive, EMS activated, patient given Narcan on field and brought to hospital). She denies taking any more pills than this, denies overdosing, denies ever taking her medications other than how its prescribed. Patient states that the only medication she is on is Benadryl for her allergies. When asked about the oxycodone she reports taking, she states that it was past prescriptions and she took it for pain. She expresses that her son made her come to hospital because "hes very nice and was worried" but that she would like to go home. She reports her mood is good, but states she does get sad when she thinks of her older son who  13 years ago (come back to son's death multiple times during interview). She reports good sleep and energy. She reports appetite is somewhat chronically low due to partial resection of stomach but that she tries to get 2-3 meals a day. She denies anhedonia, reports enjoying watching television. She denies any persistent depressive, anxiety, manic, or psychotic symptoms. She denies have a psychiatrist, states she has a therapist she sees approximately 3 times a month. She denies any recent or current PSI/SI/IP, denies HI/IP, denies AVH. She states that she has a great life and great home and would never harm self.     Collateral obtained from adult son Kalpesh #[phone] by both JUNIOR Rooney (see separate note) and this writer. Per son, patient is not overtly forgetful or confused at baseline, and things her current state is likely due to situation regarding overdose/Narcan. He also reports that patient has a hx of severely minimizing symptoms and situations. He reports that patient has been had worsening depressed mood for the past week, especially since "hearing some bad news regarding her uclers from her doctor, but she didn't tell me the details" and due to brother's death anniversary being this month. He reports that patient has expressed SI, and stated she wished to be with her dead son. He states that he found patient down/unresponsive and that he and his father found her pill bottles almost 2/3 empty and that they were just filled recently. He reports the pill bottles were that of Valium, Oxycodone, and Butalbital (unsure of doses). Reports a similar episode in January, which son believes was also an SA via overdose, but she "minimized symptoms and was able to talk her way to being discharged." He reports that patient also at times misuses prescribed medications and will take more than directed. He reports that patient has a psychiatrist who she sees once a month for the Valium but denies that she has a therapist she sees 3x a month. Reports that patient has chronically been somewhat unsteady on her feet and has hx of falls, though she does not use walker/cane. He strongly believes that this was a SA, does not feel safe with patient being discharged, and is strongly advocating for admission.    Patient seen and evaluated on IPP with Attending psychiatrist Dr. Murguia 25. On approach patient calm and semi cooperative, pleasant. Patient adamantly states that she did not have a suicide attempt. states she has chronic insomnia, did not sleep for 2 days, took and extra Ambien. Made it a point to tell us that she is "Yazid[employer]", her cousin committed suicide and would call 911 on anyone with SI, including herself. Denies suicidal/homicidal ideations. Denies depression. States she has some anxiety but take valium for it. States this is the only time she took more medication than she should have. States she takes her medication as prescribes. Presents as evasive. Appears to be minimizing her symptoms and the fact that she overdosed. States she was just sleeping. Will continue to monitor. At this time team unsure if patient is safe to go home. Attending discussed possible adjustments to patients medication. Addiction and CATCH team consult put in. Denies any ETOH or drug use.    Patient seen and evaluated 25. On approach patient calm and cooperative, pleasant. No agitation or aggression noted. In good behavorial control. Patient states she is tolerating valium taper and Lexapro well. Patient has not needed PRN Valium. Denies suicidal/homicidal ideations. Able to contract for safety. Denies any A/V hallucinations. No evidence of psychosis noted. Anticipated discharge tomorrow 25. Complaint with medication. Does not warrant continued inpatient hospitalization. Patient does not present a risk to self or others at this time. Patient visible on the unit engaging with peers and attending groups.

## 2025-04-22 LAB — DRUG SCREEN, SERUM: SIGNIFICANT CHANGE UP

## 2025-04-22 PROCEDURE — 99231 SBSQ HOSP IP/OBS SF/LOW 25: CPT

## 2025-04-22 RX ORDER — MAGNESIUM, ALUMINUM HYDROXIDE 200-200 MG
30 TABLET,CHEWABLE ORAL EVERY 6 HOURS
Refills: 0 | Status: DISCONTINUED | OUTPATIENT
Start: 2025-04-22 | End: 2025-04-24

## 2025-04-22 RX ADMIN — OXYCODONE HYDROCHLORIDE 10 MILLIGRAM(S): 30 TABLET ORAL at 16:32

## 2025-04-22 RX ADMIN — LOSARTAN POTASSIUM 100 MILLIGRAM(S): 100 TABLET, FILM COATED ORAL at 08:06

## 2025-04-22 RX ADMIN — NICOTINE POLACRILEX 1 PATCH: 4 GUM, CHEWING ORAL at 08:06

## 2025-04-22 RX ADMIN — Medication 20 MILLIGRAM(S): at 08:06

## 2025-04-22 RX ADMIN — NICOTINE POLACRILEX 1 PATCH: 4 GUM, CHEWING ORAL at 08:25

## 2025-04-22 RX ADMIN — ESCITALOPRAM OXALATE 5 MILLIGRAM(S): 20 TABLET ORAL at 08:04

## 2025-04-22 RX ADMIN — Medication 50 MILLIGRAM(S): at 22:54

## 2025-04-22 RX ADMIN — Medication 20 MILLIGRAM(S): at 20:09

## 2025-04-22 RX ADMIN — Medication 650 MILLIGRAM(S): at 13:35

## 2025-04-22 RX ADMIN — NICOTINE POLACRILEX 1 PATCH: 4 GUM, CHEWING ORAL at 08:18

## 2025-04-22 RX ADMIN — NICOTINE POLACRILEX 1 PATCH: 4 GUM, CHEWING ORAL at 20:10

## 2025-04-22 RX ADMIN — OXYCODONE HYDROCHLORIDE 10 MILLIGRAM(S): 30 TABLET ORAL at 08:10

## 2025-04-22 NOTE — BH INPATIENT PSYCHIATRY PROGRESS NOTE - CURRENT MEDICATION
MEDICATIONS  (STANDING):  escitalopram 5 milliGRAM(s) Oral daily  famotidine    Tablet 20 milliGRAM(s) Oral two times a day  losartan 100 milliGRAM(s) Oral daily  nicotine -  14 mG/24Hr(s) Patch 1 Patch Transdermal daily    MEDICATIONS  (PRN):  acetaminophen     Tablet .. 650 milliGRAM(s) Oral every 6 hours PRN Mild Pain (1 - 3), Moderate Pain (4 - 6)  albuterol    90 MICROgram(s) HFA Inhaler 1 Puff(s) Inhalation every 4 hours PRN cough, sob  diazepam    Tablet 2 milliGRAM(s) Oral two times a day PRN Anxiety  haloperidol     Tablet 2 milliGRAM(s) Oral every 6 hours PRN agitation  nicotine  Polacrilex Gum 2 milliGRAM(s) Oral every 2 hours PRN Smoking Cessation  oxyCODONE    IR 10 milliGRAM(s) Oral every 8 hours PRN Severe Pain (7 - 10)  traZODone 50 milliGRAM(s) Oral at bedtime PRN insomnia

## 2025-04-22 NOTE — BH INPATIENT PSYCHIATRY PROGRESS NOTE - NSBHMETABOLIC_PSY_ALL_CORE_FT
BMI: BMI (kg/m2): 19 (04-12-25 @ 17:29)  HbA1c: A1C with Estimated Average Glucose Result: 5.5 % (04-15-25 @ 08:39)    Glucose: POCT Blood Glucose.: 87 mg/dL (04-12-25 @ 03:35)    BP: 116/71 (04-22-25 @ 08:06) (101/70 - 134/98)Vital Signs Last 24 Hrs  T(C): 36.5 (04-22-25 @ 08:06), Max: 36.6 (04-21-25 @ 15:36)  T(F): 97.7 (04-22-25 @ 08:06), Max: 97.9 (04-21-25 @ 15:36)  HR: 99 (04-22-25 @ 08:06) (99 - 99)  BP: 116/71 (04-22-25 @ 08:06) (116/71 - 116/75)  BP(mean): --  RR: --  SpO2: --      Lipid Panel: Date/Time: 04-15-25 @ 08:39  Cholesterol, Serum: 188  LDL Cholesterol Calculated: 95  HDL Cholesterol, Serum: 75  Total Cholesterol/HDL Ration Measurement: --  Triglycerides, Serum: 103

## 2025-04-22 NOTE — BH INPATIENT PSYCHIATRY PROGRESS NOTE - NSBHFUPINTERVALHXFT_PSY_A_CORE
Patient seen and evaluated 4/22/25. On approach patient calm and semi cooperative, pleasant. No agitation or aggression noted. In good behavorial control. Continues to present as evasive. Continues to appear to be minimizing her symptoms and the fact that she overdosed. Patient continues to adamantly state that she did not have a suicide attempt. Recently started on Lexapro. Will continue to monitor and titrate accordingly. Valium recently decreased. Patient states she is tolerating decrease well. Patient has not needed PRN Valium. Will continue to monitor and taper. Denies suicidal/homicidal ideations. Denies depression. Will continue to monitor. Patient visible on the unit engaging with peers and attending groups. Anticipated discharge later in week pending continue to improve and continue to tolerate Valium taper.  Patient seen and evaluated 4/22/25. On approach patient calm and semi cooperative, pleasant. No agitation or aggression noted. In good behavorial control.  Recently started on Lexapro. Will continue to monitor and titrate accordingly. Valium recently decreased. Patient states she is tolerating decrease well. Patient has not needed PRN Valium. Will continue to monitor and taper. Denies suicidal/homicidal ideations. Denies depression. Patient visible on the unit engaging with peers and attending groups. Anticipated discharge later in week pending continue to improve and continue to tolerate Valium taper.

## 2025-04-22 NOTE — BH INPATIENT PSYCHIATRY PROGRESS NOTE - PRN MEDS
MEDICATIONS  (PRN):  acetaminophen     Tablet .. 650 milliGRAM(s) Oral every 6 hours PRN Mild Pain (1 - 3), Moderate Pain (4 - 6)  albuterol    90 MICROgram(s) HFA Inhaler 1 Puff(s) Inhalation every 4 hours PRN cough, sob  diazepam    Tablet 2 milliGRAM(s) Oral two times a day PRN Anxiety  haloperidol     Tablet 2 milliGRAM(s) Oral every 6 hours PRN agitation  nicotine  Polacrilex Gum 2 milliGRAM(s) Oral every 2 hours PRN Smoking Cessation  oxyCODONE    IR 10 milliGRAM(s) Oral every 8 hours PRN Severe Pain (7 - 10)  traZODone 50 milliGRAM(s) Oral at bedtime PRN insomnia

## 2025-04-22 NOTE — BH INPATIENT PSYCHIATRY PROGRESS NOTE - NSBHASSESSSUMMFT_PSY_ALL_CORE
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 Of note, son and father discovered that pill bottles which were recently filled were almost 2/3s empty and strongly believes this was a suicide attempt.    Patient seen and evaluated 4/22/25. Patient continues to present as evasive. Continues to appears to be minimizing her symptoms and the fact that she overdosed. Recently started on Lexapro. Valium recently decreased. Denies suicidal/homicidal ideations. Denies depression. Focused on discharge. Tapering off Valium. Will continue to monitor. Anticipated discharge later this week pending continues to improve and continues to tolerate Valium taper.         #Mood Disorder  -Valium 2mg BID PRN    -Trazodone 50mg bedtime PRN for insomnia    -Lexapro 5mg QD     -Haldol 2mg Q6 PRN for agitation    -Tylenol PRN for pain    #Home meds  -Oxycodone PRN  -Albuterol PRN  -Losartan    #Opioid abuse  -Addiction  -CATCH team    #Agitation  -for agitation not amenable to verbal redirection, may give haldol 2 mg q6h prn, ativan 1 mg q6h prn, benadryl 25 mg q6h prn with escalation to IM if pt is a danger to self or/and others with repeat EKG to ensure QTc <500 ms.    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 Of note, son and father discovered that pill bottles which were recently filled were almost 2/3s empty and strongly believes this was a suicide attempt.    Patient seen and evaluated 4/22/25. Patient recently started on Lexapro. Appears to be tolerating well. Tapering off Valium. Denies suicidal/homicidal ideations. Denies depression. Focused on discharge. Will continue to monitor. Anticipated discharge later this week pending continues to improve and continues to tolerate Valium taper.     #Mood Disorder  -Valium 2mg BID PRN    -Trazodone 50mg bedtime PRN for insomnia    -Lexapro 5mg QD     -Haldol 2mg Q6 PRN for agitation    -Tylenol PRN for pain    #Home meds  -Oxycodone PRN  -Albuterol PRN  -Losartan    #Opioid abuse  -Addiction  -CATCH team    #Agitation  -for agitation not amenable to verbal redirection, may give haldol 2 mg q6h prn, ativan 1 mg q6h prn, benadryl 25 mg q6h prn with escalation to IM if pt is a danger to self or/and others with repeat EKG to ensure QTc <500 ms.    -PLEASE AVOID BENZOS d/t potential/hx of miss use of pain meds

## 2025-04-22 NOTE — BH INPATIENT PSYCHIATRY PROGRESS NOTE - NSBHCHARTREVIEWVS_PSY_A_CORE FT
Vital Signs Last 24 Hrs  T(C): 36.5 (04-22-25 @ 08:06), Max: 36.6 (04-21-25 @ 15:36)  T(F): 97.7 (04-22-25 @ 08:06), Max: 97.9 (04-21-25 @ 15:36)  HR: 99 (04-22-25 @ 08:06) (99 - 99)  BP: 116/71 (04-22-25 @ 08:06) (116/71 - 116/75)  BP(mean): --  RR: --  SpO2: --

## 2025-04-23 PROCEDURE — 99231 SBSQ HOSP IP/OBS SF/LOW 25: CPT

## 2025-04-23 RX ORDER — NICOTINE POLACRILEX 4 MG/1
1 GUM, CHEWING ORAL
Qty: 30 | Refills: 0
Start: 2025-04-23 | End: 2025-04-27

## 2025-04-23 RX ORDER — ESCITALOPRAM OXALATE 20 MG/1
1 TABLET ORAL
Qty: 14 | Refills: 0
Start: 2025-04-23 | End: 2025-05-06

## 2025-04-23 RX ORDER — OXYCODONE HYDROCHLORIDE 30 MG/1
1 TABLET ORAL
Qty: 0 | Refills: 0 | DISCHARGE
Start: 2025-04-23

## 2025-04-23 RX ORDER — LOSARTAN POTASSIUM 100 MG/1
1 TABLET, FILM COATED ORAL
Qty: 0 | Refills: 0 | DISCHARGE
Start: 2025-04-23

## 2025-04-23 RX ORDER — ALBUTEROL SULFATE 2.5 MG/3ML
1 VIAL, NEBULIZER (ML) INHALATION
Qty: 0 | Refills: 0 | DISCHARGE
Start: 2025-04-23

## 2025-04-23 RX ADMIN — OXYCODONE HYDROCHLORIDE 10 MILLIGRAM(S): 30 TABLET ORAL at 17:50

## 2025-04-23 RX ADMIN — Medication 650 MILLIGRAM(S): at 13:39

## 2025-04-23 RX ADMIN — NICOTINE POLACRILEX 1 PATCH: 4 GUM, CHEWING ORAL at 19:27

## 2025-04-23 RX ADMIN — OXYCODONE HYDROCHLORIDE 10 MILLIGRAM(S): 30 TABLET ORAL at 01:24

## 2025-04-23 RX ADMIN — NICOTINE POLACRILEX 1 PATCH: 4 GUM, CHEWING ORAL at 08:50

## 2025-04-23 RX ADMIN — Medication 20 MILLIGRAM(S): at 20:58

## 2025-04-23 RX ADMIN — Medication 50 MILLIGRAM(S): at 22:31

## 2025-04-23 RX ADMIN — NICOTINE POLACRILEX 1 PATCH: 4 GUM, CHEWING ORAL at 08:01

## 2025-04-23 RX ADMIN — OXYCODONE HYDROCHLORIDE 10 MILLIGRAM(S): 30 TABLET ORAL at 09:39

## 2025-04-23 RX ADMIN — Medication 20 MILLIGRAM(S): at 08:49

## 2025-04-23 RX ADMIN — OXYCODONE HYDROCHLORIDE 10 MILLIGRAM(S): 30 TABLET ORAL at 11:09

## 2025-04-23 RX ADMIN — OXYCODONE HYDROCHLORIDE 10 MILLIGRAM(S): 30 TABLET ORAL at 01:55

## 2025-04-23 RX ADMIN — ESCITALOPRAM OXALATE 5 MILLIGRAM(S): 20 TABLET ORAL at 08:50

## 2025-04-23 RX ADMIN — OXYCODONE HYDROCHLORIDE 10 MILLIGRAM(S): 30 TABLET ORAL at 18:32

## 2025-04-23 RX ADMIN — LOSARTAN POTASSIUM 100 MILLIGRAM(S): 100 TABLET, FILM COATED ORAL at 08:49

## 2025-04-23 RX ADMIN — Medication 650 MILLIGRAM(S): at 18:31

## 2025-04-23 RX ADMIN — Medication 30 MILLILITER(S): at 15:16

## 2025-04-23 NOTE — BH INPATIENT PSYCHIATRY PROGRESS NOTE - CURRENT MEDICATION
MEDICATIONS  (STANDING):  escitalopram 5 milliGRAM(s) Oral daily  famotidine    Tablet 20 milliGRAM(s) Oral two times a day  losartan 100 milliGRAM(s) Oral daily  nicotine -  14 mG/24Hr(s) Patch 1 Patch Transdermal daily    MEDICATIONS  (PRN):  acetaminophen     Tablet .. 650 milliGRAM(s) Oral every 6 hours PRN Mild Pain (1 - 3), Moderate Pain (4 - 6)  albuterol    90 MICROgram(s) HFA Inhaler 1 Puff(s) Inhalation every 4 hours PRN cough, sob  aluminum hydroxide/magnesium hydroxide/simethicone Suspension 30 milliLiter(s) Oral every 6 hours PRN Dyspepsia  diazepam    Tablet 2 milliGRAM(s) Oral two times a day PRN Anxiety  haloperidol     Tablet 2 milliGRAM(s) Oral every 6 hours PRN agitation  nicotine  Polacrilex Gum 2 milliGRAM(s) Oral every 2 hours PRN Smoking Cessation  oxyCODONE    IR 10 milliGRAM(s) Oral every 8 hours PRN Severe Pain (7 - 10)  traZODone 50 milliGRAM(s) Oral at bedtime PRN insomnia

## 2025-04-23 NOTE — BH INPATIENT PSYCHIATRY PROGRESS NOTE - NSBHFUPINTERVALHXFT_PSY_A_CORE
Patient seen and evaluated 4/23/25. On approach patient calm and semi cooperative, pleasant. No agitation or aggression noted. In good behavorial control. Recently started on Lexapro. Valium recently decreased. Patient states she is tolerating valium decrease and Lexapro well. Patient has not needed PRN Valium. Denies suicidal/homicidal ideations. Able to contract for safety. Anticipated discharge 4/24/25. Complaint with medication. Does not warrant continued inpatient hospitalization. Patient does not present a risk to self or others at this time. Patient visible on the unit engaging with peers and attending groups.     Spoke to patients  Ryan [phone], patients son Kalpesh will be picking her up at discharge tomorrow, time unknown will follow up later today with son.  Patient seen and evaluated 4/23/25. On approach patient calm and semi cooperative, pleasant. No agitation or aggression noted. In good behavorial control. Recently started on Lexapro. Valium recently decreased. Patient states she is tolerating valium decrease and Lexapro well. Patient has not needed PRN Valium. Denies suicidal/homicidal ideations. Able to contract for safety. Anticipated discharge 4/24/25. Complaint with medication. Does not warrant continued inpatient hospitalization. Patient does not present a risk to self or others at this time. Patient visible on the unit engaging with peers and attending groups.     Spoke to patients  Ryan [phone], patients son Kalpesh will be picking her up at 11 AM for discharge.  Patient seen and evaluated 4/23/25. On approach patient calm and cooperative, pleasant. No agitation or aggression noted. In good behavorial control. Patient states she is tolerating valium taper and Lexapro well. Patient has not needed PRN Valium. Denies suicidal/homicidal ideations. Able to contract for safety. Denies any A/V hallucinations. No evidence of psychosis noted. Anticipated discharge tomorrow 4/24/25. Complaint with medication. Does not warrant continued inpatient hospitalization. Patient does not present a risk to self or others at this time. Patient visible on the unit engaging with peers and attending groups.     Spoke to patients  Ryan [phone], patients son Kalpesh will be picking her up at 11 AM for discharge.

## 2025-04-23 NOTE — BH INPATIENT PSYCHIATRY PROGRESS NOTE - NSBHMETABOLIC_PSY_ALL_CORE_FT
BMI: BMI (kg/m2): 19 (04-12-25 @ 17:29)  HbA1c: A1C with Estimated Average Glucose Result: 5.5 % (04-15-25 @ 08:39)    Glucose: POCT Blood Glucose.: 87 mg/dL (04-12-25 @ 03:35)    BP: 119/83 (04-23-25 @ 07:40) (103/78 - 128/89)Vital Signs Last 24 Hrs  T(C): 36.7 (04-23-25 @ 07:40), Max: 36.7 (04-22-25 @ 15:52)  T(F): 98.1 (04-23-25 @ 07:40), Max: 98.1 (04-22-25 @ 15:52)  HR: 90 (04-23-25 @ 07:40) (87 - 90)  BP: 119/83 (04-23-25 @ 07:40) (119/83 - 128/89)  BP(mean): --  RR: --  SpO2: --      Lipid Panel: Date/Time: 04-15-25 @ 08:39  Cholesterol, Serum: 188  LDL Cholesterol Calculated: 95  HDL Cholesterol, Serum: 75  Total Cholesterol/HDL Ration Measurement: --  Triglycerides, Serum: 103

## 2025-04-23 NOTE — BH INPATIENT PSYCHIATRY PROGRESS NOTE - PRN MEDS
MEDICATIONS  (PRN):  acetaminophen     Tablet .. 650 milliGRAM(s) Oral every 6 hours PRN Mild Pain (1 - 3), Moderate Pain (4 - 6)  albuterol    90 MICROgram(s) HFA Inhaler 1 Puff(s) Inhalation every 4 hours PRN cough, sob  aluminum hydroxide/magnesium hydroxide/simethicone Suspension 30 milliLiter(s) Oral every 6 hours PRN Dyspepsia  diazepam    Tablet 2 milliGRAM(s) Oral two times a day PRN Anxiety  haloperidol     Tablet 2 milliGRAM(s) Oral every 6 hours PRN agitation  nicotine  Polacrilex Gum 2 milliGRAM(s) Oral every 2 hours PRN Smoking Cessation  oxyCODONE    IR 10 milliGRAM(s) Oral every 8 hours PRN Severe Pain (7 - 10)  traZODone 50 milliGRAM(s) Oral at bedtime PRN insomnia

## 2025-04-23 NOTE — BH INPATIENT PSYCHIATRY PROGRESS NOTE - NSBHASSESSSUMMFT_PSY_ALL_CORE
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 Of note, son and father discovered that pill bottles which were recently filled were almost 2/3s empty and strongly believes this was a suicide attempt.    Patient seen and evaluated 4/23/25. Patient recently started on Lexapro. Appears to be tolerating well. Tapering off Valium. Denies suicidal/homicidal ideations. Able to contract for safety. Anticipated discharge 4/24/25. Complaint with medication. Does not warrant continued inpatient hospitalization. Patient does not present a risk to self or others at this time.    Spoke to patients  Ryan [phone], patients son Kalpesh will be picking her up at discharge tomorrow, time unknown will follow up later today with son.     #Mood Disorder  -Valium 2mg BID PRN    -Trazodone 50mg bedtime PRN for insomnia    -Lexapro 5mg QD     -Haldol 2mg Q6 PRN for agitation    -Tylenol PRN for pain    #Home meds  -Oxycodone PRN  -Albuterol PRN  -Losartan    #Opioid abuse  -Addiction  -CATCH team    #Agitation  -for agitation not amenable to verbal redirection, may give haldol 2 mg q6h prn, ativan 1 mg q6h prn, benadryl 25 mg q6h prn with escalation to IM if pt is a danger to self or/and others with repeat EKG to ensure QTc <500 ms.    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 Of note, son and father discovered that pill bottles which were recently filled were almost 2/3s empty and strongly believes this was a suicide attempt.    Patient seen and evaluated 4/23/25. Patient recently started on Lexapro. Appears to be tolerating well. Tapering off Valium. Denies suicidal/homicidal ideations. Able to contract for safety. Anticipated discharge 4/24/25. Complaint with medication. Does not warrant continued inpatient hospitalization. Patient does not present a risk to self or others at this time.    Spoke to patients  Ryan [phone], patients son Kalpesh will be picking her up at 11 AM for discharge.     #Mood Disorder  -Valium 2mg BID PRN    -Trazodone 50mg bedtime PRN for insomnia    -Lexapro 5mg QD     -Haldol 2mg Q6 PRN for agitation    -Tylenol PRN for pain    #Home meds  -Oxycodone PRN  -Albuterol PRN  -Losartan    #Opioid abuse  -Addiction  -CATCH team    #Agitation  -for agitation not amenable to verbal redirection, may give haldol 2 mg q6h prn, ativan 1 mg q6h prn, benadryl 25 mg q6h prn with escalation to IM if pt is a danger to self or/and others with repeat EKG to ensure QTc <500 ms.    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 Of note, son and father discovered that pill bottles which were recently filled were almost 2/3s empty and strongly believes this was a suicide attempt.    Patient seen and evaluated 4/23/25. Patient denies suicidal/homicidal ideations. Able to contract for safety. Denies any A/V hallucinations. No evidence of psychosis noted. Anticipated discharge 4/24/25. Complaint with medication. Does not warrant continued inpatient hospitalization. Patient does not present a risk to self or others at this time.    Spoke to patients  Ryan [phone], patients son Kalpesh will be picking her up at 11 AM for discharge.     #Mood Disorder  -Valium 2mg BID PRN    -Trazodone 50mg bedtime PRN for insomnia    -Lexapro 5mg QD     -Haldol 2mg Q6 PRN for agitation    -Tylenol PRN for pain    #Home meds  -Oxycodone PRN  -Albuterol PRN  -Losartan    #Opioid abuse  -Addiction  -CATCH team    #Agitation  -for agitation not amenable to verbal redirection, may give haldol 2 mg q6h prn, ativan 1 mg q6h prn, benadryl 25 mg q6h prn with escalation to IM if pt is a danger to self or/and others with repeat EKG to ensure QTc <500 ms.    -PLEASE AVOID BENZOS d/t potential/hx of miss use of pain meds

## 2025-04-23 NOTE — BH INPATIENT PSYCHIATRY PROGRESS NOTE - NSBHCHARTREVIEWVS_PSY_A_CORE FT
Vital Signs Last 24 Hrs  T(C): 36.7 (04-23-25 @ 07:40), Max: 36.7 (04-22-25 @ 15:52)  T(F): 98.1 (04-23-25 @ 07:40), Max: 98.1 (04-22-25 @ 15:52)  HR: 90 (04-23-25 @ 07:40) (87 - 90)  BP: 119/83 (04-23-25 @ 07:40) (119/83 - 128/89)  BP(mean): --  RR: --  SpO2: --

## 2025-04-24 VITALS — DIASTOLIC BLOOD PRESSURE: 70 MMHG | SYSTOLIC BLOOD PRESSURE: 110 MMHG | HEART RATE: 90 BPM | TEMPERATURE: 99 F

## 2025-04-24 PROCEDURE — 99238 HOSP IP/OBS DSCHRG MGMT 30/<: CPT

## 2025-04-24 RX ADMIN — NICOTINE POLACRILEX 1 PATCH: 4 GUM, CHEWING ORAL at 07:25

## 2025-04-24 RX ADMIN — OXYCODONE HYDROCHLORIDE 10 MILLIGRAM(S): 30 TABLET ORAL at 02:56

## 2025-04-24 RX ADMIN — LOSARTAN POTASSIUM 100 MILLIGRAM(S): 100 TABLET, FILM COATED ORAL at 08:13

## 2025-04-24 RX ADMIN — OXYCODONE HYDROCHLORIDE 10 MILLIGRAM(S): 30 TABLET ORAL at 02:26

## 2025-04-24 RX ADMIN — NICOTINE POLACRILEX 1 PATCH: 4 GUM, CHEWING ORAL at 08:13

## 2025-04-24 RX ADMIN — ESCITALOPRAM OXALATE 5 MILLIGRAM(S): 20 TABLET ORAL at 08:13

## 2025-04-24 RX ADMIN — OXYCODONE HYDROCHLORIDE 10 MILLIGRAM(S): 30 TABLET ORAL at 10:41

## 2025-04-24 RX ADMIN — Medication 20 MILLIGRAM(S): at 08:13

## 2025-04-24 NOTE — BH INPATIENT PSYCHIATRY PROGRESS NOTE - NSBHFUPINTERVALHXFT_PSY_A_CORE
D/C today 4/24/25. Family picking her up. Meds sent to [facility] as requested by patient. Patient appeared to be in good spirits today. Endorses a better mood. Insight and judgement improved. Denies suicidal/homicidal ideations. Able to contract for safety.  Patient denies auditory or visual hallucinations. Compliant with medications. Does not warrant continued inpatient hospitalization. PA student and writer reviewed D/C papers with patient. Patient verbalized understanding. Patient does not present a risk to self or others at this time.

## 2025-04-24 NOTE — BH INPATIENT PSYCHIATRY PROGRESS NOTE - NSBHCHARTREVIEWVS_PSY_A_CORE FT
Vital Signs Last 24 Hrs  T(C): 37.1 (04-24-25 @ 07:30), Max: 37.1 (04-24-25 @ 07:30)  T(F): 98.8 (04-24-25 @ 07:30), Max: 98.8 (04-24-25 @ 07:30)  HR: 90 (04-24-25 @ 07:30) (86 - 98)  BP: 110/69 (04-24-25 @ 07:30) (110/69 - 139/84)  BP(mean): --  RR: --  SpO2: --

## 2025-04-24 NOTE — BH INPATIENT PSYCHIATRY PROGRESS NOTE - NSDCCRITERIA_PSY_ALL_CORE
her mood will be in control     will not be suicidal.  

## 2025-04-24 NOTE — BH INPATIENT PSYCHIATRY PROGRESS NOTE - NSBHMETABOLIC_PSY_ALL_CORE_FT
BMI: BMI (kg/m2): 19 (04-12-25 @ 17:29)  HbA1c: A1C with Estimated Average Glucose Result: 5.5 % (04-15-25 @ 08:39)    Glucose: POCT Blood Glucose.: 87 mg/dL (04-12-25 @ 03:35)    BP: 110/69 (04-24-25 @ 07:30) (110/69 - 139/84)Vital Signs Last 24 Hrs  T(C): 37.1 (04-24-25 @ 07:30), Max: 37.1 (04-24-25 @ 07:30)  T(F): 98.8 (04-24-25 @ 07:30), Max: 98.8 (04-24-25 @ 07:30)  HR: 90 (04-24-25 @ 07:30) (86 - 98)  BP: 110/69 (04-24-25 @ 07:30) (110/69 - 139/84)  BP(mean): --  RR: --  SpO2: --      Lipid Panel: Date/Time: 04-15-25 @ 08:39  Cholesterol, Serum: 188  LDL Cholesterol Calculated: 95  HDL Cholesterol, Serum: 75  Total Cholesterol/HDL Ration Measurement: --  Triglycerides, Serum: 103

## 2025-04-24 NOTE — BH INPATIENT PSYCHIATRY PROGRESS NOTE - NSTXSUICIDDATEEST_PSY_ALL_CORE
14-Apr-2025

## 2025-04-24 NOTE — BH INPATIENT PSYCHIATRY PROGRESS NOTE - NSBHASSESSSUMMFT_PSY_ALL_CORE
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 Of note, son and father discovered that pill bottles which were recently filled were almost 2/3s empty and strongly believes this was a suicide attempt.    D/C today 4/24/25. Family picking her up. Meds sent to [facility] as requested by patient. Patient appeared to be in good spirits today. Endorses a better mood. Insight and judgement improved. Denies suicidal/homicidal ideations. Able to contract for safety.  Patient denies auditory or visual hallucinations. Compliant with medications. Does not warrant continued inpatient hospitalization. PA student and writer reviewed D/C papers with patient. Patient verbalized understanding. Patient does not present a risk to self or others at this time.    #Mood Disorder  -Valium 2mg BID PRN    -Trazodone 50mg bedtime PRN for insomnia    -Lexapro 5mg QD     -Haldol 2mg Q6 PRN for agitation    -Tylenol PRN for pain    #Home meds  -Oxycodone PRN  -Albuterol PRN  -Losartan    #Opioid abuse  -Addiction  -CATCH team    #Agitation  -for agitation not amenable to verbal redirection, may give haldol 2 mg q6h prn, ativan 1 mg q6h prn, benadryl 25 mg q6h prn with escalation to IM if pt is a danger to self or/and others with repeat EKG to ensure QTc <500 ms.    -PLEASE AVOID BENZOS d/t potential/hx of miss use of pain meds

## 2025-04-24 NOTE — BH INPATIENT PSYCHIATRY PROGRESS NOTE - NSBHATTESTBILLONSITE_PSY_A_CORE
MIKE to bill

## 2025-04-24 NOTE — BH CHART NOTE - DETAILS
Repeat assessment done for time period of hospitalization. Denies suicidal/homicidal ideations. Patient able to contract to safety. Compliant with medication. Does not warrant continued inpatient hospitalization. Patient does not appear to be a risk to self or to others at this time.

## 2025-04-24 NOTE — BH INPATIENT PSYCHIATRY PROGRESS NOTE - NSBHMSEPERCEPT_PSY_A_CORE
No abnormalities
contact guard

## 2025-04-24 NOTE — BH INPATIENT PSYCHIATRY PROGRESS NOTE - NSBHCONSBHPROVDETAILS_PSY_A_CORE  FT
PA student Left message for Dr. Walden who is prescribing the valium

## 2025-04-24 NOTE — BH CHART NOTE - NSDETAILSOTHERINTERV_PSY_ALL_CORE
Routine observation while on unit. Patient discharged today. Denies suicidal/homicidal ideations. Patient able to contract to safety. Does not warrant continued inpatient hospitalization. Patient does not appear to be a risk to self or to others at this time.

## 2025-04-24 NOTE — BH INPATIENT PSYCHIATRY PROGRESS NOTE - NSTXSUBMISINTERMD_PSY_ALL_CORE
Medication management and milieu therapy. Consult placed for the CATCH team to educate the signs of an opioid overdose and provide patients with Narcan.

## 2025-04-24 NOTE — BH INPATIENT PSYCHIATRY PROGRESS NOTE - NSBHCONTPROVIDER_PSY_ALL_CORE
No, attempted...

## 2025-04-24 NOTE — BH CHART NOTE - NSSUICPROTFACT_PSY_ALL_CORE
Responsibility to children, family, or others/Identifies reasons for living/Supportive social network of family or friends/Cultural, spiritual and/or moral attitudes against suicide/Positive therapeutic relationships/[employer] beliefs

## 2025-04-24 NOTE — BH INPATIENT PSYCHIATRY PROGRESS NOTE - NSBHCHARTREVIEWINVESTIGATE_PSY_A_CORE FT
< from: 12 Lead ECG (04.12.25 @ 12:24) >    Ventricular Rate 59 BPM    Atrial Rate 59 BPM    P-R Interval 172 ms    QRS Duration 78 ms    Q-T Interval 472 ms    QTC Calculation(Bazett) 467 ms    P Axis 56 degrees    R Axis 58 degrees    T Axis 40 degrees    Diagnosis Line    Sinus bradycardia with sinus arrhythmia  Septal infarct , age undetermined vs lead placement   Abnormal ECG      

## 2025-04-24 NOTE — BH INPATIENT PSYCHIATRY PROGRESS NOTE - NSICDXBHPRIMARYDX_PSY_ALL_CORE
Mood disorder   F39  

## 2025-04-24 NOTE — BH INPATIENT PSYCHIATRY PROGRESS NOTE - NSBHCHARTREVIEWLAB_PSY_A_CORE FT
Complete Blood Count + Automated Diff (04.12.25 @ 03:54)   Auto NRBC: 0 /100 WBCs  WBC Count: 8.37 K/uL  RBC Count: 5.28 M/uL  Hemoglobin: 13.3 g/dL  Hematocrit: 43.8 %  Mean Cell Volume: 83.0 fL  Mean Cell Hemoglobin: 25.2 pg  Mean Cell Hemoglobin Conc: 30.4 g/dL  Red Cell Distrib Width: 16.8 %  Platelet Count - Automated: 282 K/uL  MPV: 9.5 fL  Neutrophil #: 6.88 K/uL  Lymphocyte #: 0.99 K/uL  Monocyte #: 0.37 K/uL  Eosinophil #: 0.04 K/uL  Basophil #: 0.06 K/uL  Neutrophil %: 82.2: Differential percentages must be correlated with absolute numbers for   clinical significance. %  Lymphocyte %: 11.8 %  Monocyte %: 4.4 %  Eosinophil %: 0.5 %  Basophil %: 0.7 %  Auto Immature Granulocyte %: 0.4: (Includes meta, myelo and promyelocytes). Mild elevations in immature   granulocytes may be seen with many inflammatory processes and pregnancy;   clinical correlation suggested. %

## 2025-04-24 NOTE — BH INPATIENT PSYCHIATRY PROGRESS NOTE - NSBHATTESTATTENDNAMEFT_PSY_A_CORE
Josefina Murguia MD  

## 2025-04-24 NOTE — BH INPATIENT PSYCHIATRY PROGRESS NOTE - NSBHATTESTAPPBILLTIME_PSY_A_CORE
I attest my time as MIKE is greater than 50% of the total combined time spent on qualifying patient care activities. I have reviewed and verified the documentation.

## 2025-04-28 ENCOUNTER — APPOINTMENT (OUTPATIENT)
Dept: PSYCHIATRY | Facility: CLINIC | Age: 60
End: 2025-04-28

## 2025-04-28 DIAGNOSIS — F11.10 OPIOID ABUSE, UNCOMPLICATED: ICD-10-CM

## 2025-04-28 DIAGNOSIS — G89.29 OTHER CHRONIC PAIN: ICD-10-CM

## 2025-04-28 DIAGNOSIS — Z79.890 HORMONE REPLACEMENT THERAPY: ICD-10-CM

## 2025-04-28 DIAGNOSIS — Z87.891 PERSONAL HISTORY OF NICOTINE DEPENDENCE: ICD-10-CM

## 2025-04-28 DIAGNOSIS — E03.9 HYPOTHYROIDISM, UNSPECIFIED: ICD-10-CM

## 2025-04-28 DIAGNOSIS — Z86.73 PERSONAL HISTORY OF TRANSIENT ISCHEMIC ATTACK (TIA), AND CEREBRAL INFARCTION WITHOUT RESIDUAL DEFICITS: ICD-10-CM

## 2025-04-28 DIAGNOSIS — F39 UNSPECIFIED MOOD [AFFECTIVE] DISORDER: ICD-10-CM

## 2025-04-28 DIAGNOSIS — M79.7 FIBROMYALGIA: ICD-10-CM

## 2025-04-28 NOTE — BH SOCIAL WORK CONFIRMATION FOLLOW UP NOTE - NSCOMMENTS_PSY_ALL_CORE
As per : [facility] contact call was made by  (12/18); contact was made with patient via telephone to check in and review upcoming appointments.   As per SW assistant patient attended her intake at [facility].  As per : [facility] contact call was made by  (12/18); contact was made with patient via telephone to check in and review upcoming appointments.   As per SW assistant patient did not attend outpatient appointment. MCT sent.

## 2025-05-06 ENCOUNTER — APPOINTMENT (OUTPATIENT)
Dept: PSYCHIATRY | Facility: CLINIC | Age: 60
End: 2025-05-06

## 2025-05-15 ENCOUNTER — EMERGENCY (EMERGENCY)
Facility: HOSPITAL | Age: 60
LOS: 0 days | Discharge: HOME | End: 2025-05-15
Attending: EMERGENCY MEDICINE
Payer: MEDICAID

## 2025-05-15 VITALS
OXYGEN SATURATION: 99 % | HEART RATE: 70 BPM | DIASTOLIC BLOOD PRESSURE: 51 MMHG | RESPIRATION RATE: 18 BRPM | TEMPERATURE: 99 F | HEIGHT: 69 IN | SYSTOLIC BLOOD PRESSURE: 135 MMHG

## 2025-05-15 DIAGNOSIS — R05.9 COUGH, UNSPECIFIED: ICD-10-CM

## 2025-05-15 DIAGNOSIS — Z91.010 ALLERGY TO PEANUTS: ICD-10-CM

## 2025-05-15 DIAGNOSIS — S52.90XA UNSPECIFIED FRACTURE OF UNSPECIFIED FOREARM, INITIAL ENCOUNTER FOR CLOSED FRACTURE: Chronic | ICD-10-CM

## 2025-05-15 DIAGNOSIS — Z90.49 ACQUIRED ABSENCE OF OTHER SPECIFIED PARTS OF DIGESTIVE TRACT: Chronic | ICD-10-CM

## 2025-05-15 DIAGNOSIS — Z98.891 HISTORY OF UTERINE SCAR FROM PREVIOUS SURGERY: Chronic | ICD-10-CM

## 2025-05-15 DIAGNOSIS — Z91.018 ALLERGY TO OTHER FOODS: ICD-10-CM

## 2025-05-15 DIAGNOSIS — Z87.891 PERSONAL HISTORY OF NICOTINE DEPENDENCE: ICD-10-CM

## 2025-05-15 DIAGNOSIS — Z98.890 OTHER SPECIFIED POSTPROCEDURAL STATES: Chronic | ICD-10-CM

## 2025-05-15 DIAGNOSIS — R09.81 NASAL CONGESTION: ICD-10-CM

## 2025-05-15 LAB
ALBUMIN SERPL ELPH-MCNC: 3.8 G/DL — SIGNIFICANT CHANGE UP (ref 3.5–5.2)
ALP SERPL-CCNC: 110 U/L — SIGNIFICANT CHANGE UP (ref 30–115)
ALT FLD-CCNC: 16 U/L — SIGNIFICANT CHANGE UP (ref 0–41)
ANION GAP SERPL CALC-SCNC: 10 MMOL/L — SIGNIFICANT CHANGE UP (ref 7–14)
APAP SERPL-MCNC: 9 UG/ML — LOW (ref 10–30)
AST SERPL-CCNC: 18 U/L — SIGNIFICANT CHANGE UP (ref 0–41)
BASOPHILS # BLD AUTO: 0.06 K/UL — SIGNIFICANT CHANGE UP (ref 0–0.2)
BASOPHILS NFR BLD AUTO: 1 % — SIGNIFICANT CHANGE UP (ref 0–1)
BILIRUB SERPL-MCNC: <0.2 MG/DL — SIGNIFICANT CHANGE UP (ref 0.2–1.2)
BUN SERPL-MCNC: 17 MG/DL — SIGNIFICANT CHANGE UP (ref 10–20)
CALCIUM SERPL-MCNC: 8.9 MG/DL — SIGNIFICANT CHANGE UP (ref 8.4–10.5)
CHLORIDE SERPL-SCNC: 114 MMOL/L — HIGH (ref 98–110)
CO2 SERPL-SCNC: 20 MMOL/L — SIGNIFICANT CHANGE UP (ref 17–32)
CREAT SERPL-MCNC: 1.2 MG/DL — SIGNIFICANT CHANGE UP (ref 0.7–1.5)
EGFR: 51 ML/MIN/1.73M2 — LOW
EGFR: 51 ML/MIN/1.73M2 — LOW
EOSINOPHIL # BLD AUTO: 0.19 K/UL — SIGNIFICANT CHANGE UP (ref 0–0.7)
EOSINOPHIL NFR BLD AUTO: 3.2 % — SIGNIFICANT CHANGE UP (ref 0–8)
ETHANOL SERPL-MCNC: <10 MG/DL — SIGNIFICANT CHANGE UP
GLUCOSE SERPL-MCNC: 83 MG/DL — SIGNIFICANT CHANGE UP (ref 70–99)
HCT VFR BLD CALC: 36.8 % — LOW (ref 37–47)
HGB BLD-MCNC: 11.4 G/DL — LOW (ref 12–16)
IMM GRANULOCYTES NFR BLD AUTO: 0.3 % — SIGNIFICANT CHANGE UP (ref 0.1–0.3)
LYMPHOCYTES # BLD AUTO: 1.5 K/UL — SIGNIFICANT CHANGE UP (ref 1.2–3.4)
LYMPHOCYTES # BLD AUTO: 25.2 % — SIGNIFICANT CHANGE UP (ref 20.5–51.1)
MCHC RBC-ENTMCNC: 26 PG — LOW (ref 27–31)
MCHC RBC-ENTMCNC: 31 G/DL — LOW (ref 32–37)
MCV RBC AUTO: 84 FL — SIGNIFICANT CHANGE UP (ref 81–99)
MONOCYTES # BLD AUTO: 0.62 K/UL — HIGH (ref 0.1–0.6)
MONOCYTES NFR BLD AUTO: 10.4 % — HIGH (ref 1.7–9.3)
NEUTROPHILS # BLD AUTO: 3.57 K/UL — SIGNIFICANT CHANGE UP (ref 1.4–6.5)
NEUTROPHILS NFR BLD AUTO: 59.9 % — SIGNIFICANT CHANGE UP (ref 42.2–75.2)
NRBC BLD AUTO-RTO: 0 /100 WBCS — SIGNIFICANT CHANGE UP (ref 0–0)
PLATELET # BLD AUTO: 232 K/UL — SIGNIFICANT CHANGE UP (ref 130–400)
PMV BLD: 9 FL — SIGNIFICANT CHANGE UP (ref 7.4–10.4)
POTASSIUM SERPL-MCNC: 4 MMOL/L — SIGNIFICANT CHANGE UP (ref 3.5–5)
POTASSIUM SERPL-SCNC: 4 MMOL/L — SIGNIFICANT CHANGE UP (ref 3.5–5)
PROT SERPL-MCNC: 6.2 G/DL — SIGNIFICANT CHANGE UP (ref 6–8)
RBC # BLD: 4.38 M/UL — SIGNIFICANT CHANGE UP (ref 4.2–5.4)
RBC # FLD: 18.3 % — HIGH (ref 11.5–14.5)
SALICYLATES SERPL-MCNC: <0.3 MG/DL — LOW (ref 4–30)
SODIUM SERPL-SCNC: 144 MMOL/L — SIGNIFICANT CHANGE UP (ref 135–146)
WBC # BLD: 5.96 K/UL — SIGNIFICANT CHANGE UP (ref 4.8–10.8)
WBC # FLD AUTO: 5.96 K/UL — SIGNIFICANT CHANGE UP (ref 4.8–10.8)

## 2025-05-15 PROCEDURE — 74177 CT ABD & PELVIS W/CONTRAST: CPT | Mod: 26

## 2025-05-15 PROCEDURE — 94640 AIRWAY INHALATION TREATMENT: CPT

## 2025-05-15 PROCEDURE — 85025 COMPLETE CBC W/AUTO DIFF WBC: CPT

## 2025-05-15 PROCEDURE — 71045 X-RAY EXAM CHEST 1 VIEW: CPT | Mod: 26

## 2025-05-15 PROCEDURE — 71260 CT THORAX DX C+: CPT | Mod: 26

## 2025-05-15 PROCEDURE — 99285 EMERGENCY DEPT VISIT HI MDM: CPT

## 2025-05-15 PROCEDURE — 70450 CT HEAD/BRAIN W/O DYE: CPT

## 2025-05-15 PROCEDURE — 74177 CT ABD & PELVIS W/CONTRAST: CPT

## 2025-05-15 PROCEDURE — 72125 CT NECK SPINE W/O DYE: CPT

## 2025-05-15 PROCEDURE — 99285 EMERGENCY DEPT VISIT HI MDM: CPT | Mod: 25

## 2025-05-15 PROCEDURE — 70450 CT HEAD/BRAIN W/O DYE: CPT | Mod: 26

## 2025-05-15 PROCEDURE — 71045 X-RAY EXAM CHEST 1 VIEW: CPT

## 2025-05-15 PROCEDURE — 80307 DRUG TEST PRSMV CHEM ANLYZR: CPT

## 2025-05-15 PROCEDURE — 93005 ELECTROCARDIOGRAM TRACING: CPT

## 2025-05-15 PROCEDURE — 93010 ELECTROCARDIOGRAM REPORT: CPT

## 2025-05-15 PROCEDURE — 80053 COMPREHEN METABOLIC PANEL: CPT

## 2025-05-15 PROCEDURE — 72125 CT NECK SPINE W/O DYE: CPT | Mod: 26

## 2025-05-15 PROCEDURE — 36415 COLL VENOUS BLD VENIPUNCTURE: CPT

## 2025-05-15 PROCEDURE — 71260 CT THORAX DX C+: CPT

## 2025-05-15 RX ORDER — ALBUTEROL SULFATE 2.5 MG/3ML
1 VIAL, NEBULIZER (ML) INHALATION ONCE
Refills: 0 | Status: COMPLETED | OUTPATIENT
Start: 2025-05-15 | End: 2025-05-15

## 2025-05-15 RX ADMIN — Medication 1 PUFF(S): at 09:34

## 2025-05-15 NOTE — ED PROVIDER NOTE - PHYSICAL EXAMINATION
generalized: comfortable, alert , normocephalic  eyes: PERRLA, EOMI, no orbital tenderness, no bony step off  ENT: no septal hematoma, no nasal bone tenderness, no dental injury, no gum swelling, no tongue swelling and uvula midline, oropharynx clear,   resp: CTA, no bony step off , no chest wall tenderness, no bruising to chest wall  cardiac: RRR S1S2  abd: non tender RUQ or LUQ, non distended, no bruising   msk: neck supple, no cervical tenderness, pelvis stable , no vertebral tenderness- flexion and extension in tact, gait steady, upper extremities - good rom no tenderness, lower extremities- good rom , no tenderness  skin: no lacerations, bruising or swelling   neuro: alert and oriented, follows commands, no sensory or motor deficits

## 2025-05-15 NOTE — ED PROVIDER NOTE - CLINICAL SUMMARY MEDICAL DECISION MAKING FREE TEXT BOX
Patient is a 60-year-old female presents to the emergency department for evaluation status post fall patient fell down 15 steps denies any alcohol use denies any loss of consciousness vomiting headache visual changes neck pain chest pain shortness of breath abdominal pain or back pain she is able to ambulate here in the emergency department    On physical exam patient is normocephalic atraumatic pupils equally round reactive light accommodation extraocular muscles intact no Mancia sign no raccoon eyes no septal hematoma noted no tenderness to palpation posterior aspect of CTL or L-spine chest is clear to auscultation bilaterally abdomen soft nontender nondistended bowel sounds positive no guarding no rebound extremities full range of motion no focal deficits noted    Assessment plan patient presents for evaluation status post fall 1 day prior we obtained EKG per my depend evaluation consistent with STEMI not consistent with QT prolongation not consistent with arrhythmia in addition we obtained chest x-ray per my independent evaluation not consistent with pneumonia or pneumothorax given this patient's history of fall with her past history of opiate abuse we obtained CT chest abdomen head and C-spine patient has no evidence of traumatic injury patient improved here in the emergency department ambulating with a steady gait ANO x 3 I will discharge follow-up to PMD in the next 24 to 48 hours patient states that she will follow at [facility] where her spine specialist is located we discussed indications to return

## 2025-05-15 NOTE — ED ADULT NURSE NOTE - NSFALLHARMRISKINTERV_ED_ALL_ED
Assistance OOB with selected safe patient handling equipment if applicable/Communicate risk of Fall with Harm to all staff, patient, and family/Monitor gait and stability/Provide patient with walking aids/Provide visual cue: red socks, yellow wristband, yellow gown, etc/Reinforce activity limits and safety measures with patient and family/Review medications for side effects contributing to fall risk/Bed in lowest position, wheels locked, appropriate side rails in place/Call bell, personal items and telephone in reach/Instruct patient to call for assistance before getting out of bed/chair/stretcher/Non-slip footwear applied when patient is off stretcher/[place] to call system/Physically safe environment - no spills, clutter or unnecessary equipment/Purposeful Proactive Rounding/Room/bathroom lighting operational, light cord in reach

## 2025-05-15 NOTE — ED PROVIDER NOTE - PATIENT PORTAL LINK FT
You can access the FollowMyHealth Patient Portal offered by [facility] by registering at the following website: [email or web address]. By joining [facility] FollowMyHealth portal, you will also be able to view your health information using other applications (apps) compatible with our system.

## 2025-05-15 NOTE — ED PROVIDER NOTE - OBJECTIVE STATEMENT
61 y/o female with hx of opiate abuse presents to the Ed s/p fall last night down 15 steps. patient denies any alcohol intake. patient c/o cough and congestion . patient with multiple contusions to extremities. no abdominal pain or back pain . patient ambulatory

## 2025-05-15 NOTE — ED PROVIDER NOTE - ATTENDING APP SHARED VISIT CONTRIBUTION OF CARE
I have personally performed a history and physical exam on this patient and personally directed the management of the patient. Patient is a 60-year-old female presents to the emergency department for evaluation status post fall patient fell down 15 steps denies any alcohol use denies any loss of consciousness vomiting headache visual changes neck pain chest pain shortness of breath abdominal pain or back pain she is able to ambulate here in the emergency department    On physical exam patient is normocephalic atraumatic pupils equally round reactive light accommodation extraocular muscles intact no Mancia sign no raccoon eyes no septal hematoma noted no tenderness to palpation posterior aspect of CTL or L-spine chest is clear to auscultation bilaterally abdomen soft nontender nondistended bowel sounds positive no guarding no rebound extremities full range of motion no focal deficits noted    Assessment plan patient presents for evaluation status post fall 1 day prior we obtained EKG per my depend evaluation consistent with STEMI not consistent with QT prolongation not consistent with arrhythmia in addition we obtained chest x-ray per my independent evaluation not consistent with pneumonia or pneumothorax given this patient's history of fall with her past history of opiate abuse we obtained CT chest abdomen head and C-spine patient has no evidence of traumatic injury patient improved here in the emergency department ambulating with a steady gait ANO x 3 I will discharge follow-up to PMD in the next 24 to 48 hours patient states that she will follow at [facility] where her spine specialist is located we discussed indications to return